# Patient Record
Sex: FEMALE | Race: WHITE | Employment: FULL TIME | URBAN - NONMETROPOLITAN AREA
[De-identification: names, ages, dates, MRNs, and addresses within clinical notes are randomized per-mention and may not be internally consistent; named-entity substitution may affect disease eponyms.]

---

## 2017-01-25 DIAGNOSIS — M62.830 MUSCLE SPASM OF BACK: ICD-10-CM

## 2017-01-26 RX ORDER — TIZANIDINE 2 MG/1
TABLET ORAL
Qty: 30 TABLET | Refills: 0 | Status: SHIPPED | OUTPATIENT
Start: 2017-01-26 | End: 2017-04-03

## 2017-04-03 ENCOUNTER — OFFICE VISIT (OUTPATIENT)
Dept: FAMILY MEDICINE CLINIC | Age: 22
End: 2017-04-03

## 2017-04-03 VITALS
BODY MASS INDEX: 36.14 KG/M2 | SYSTOLIC BLOOD PRESSURE: 118 MMHG | WEIGHT: 204 LBS | DIASTOLIC BLOOD PRESSURE: 72 MMHG | HEART RATE: 76 BPM | RESPIRATION RATE: 16 BRPM

## 2017-04-03 DIAGNOSIS — F41.9 ANXIETY: ICD-10-CM

## 2017-04-03 DIAGNOSIS — F32.A DEPRESSION, UNSPECIFIED DEPRESSION TYPE: ICD-10-CM

## 2017-04-03 PROCEDURE — 99213 OFFICE O/P EST LOW 20 MIN: CPT | Performed by: NURSE PRACTITIONER

## 2017-04-03 RX ORDER — VENLAFAXINE HYDROCHLORIDE 75 MG/1
75 CAPSULE, EXTENDED RELEASE ORAL DAILY
Qty: 30 CAPSULE | Refills: 1 | Status: SHIPPED | OUTPATIENT
Start: 2017-04-10 | End: 2017-05-01 | Stop reason: SDUPTHER

## 2017-04-03 RX ORDER — VENLAFAXINE HYDROCHLORIDE 37.5 MG/1
37.5 CAPSULE, EXTENDED RELEASE ORAL DAILY
Qty: 7 CAPSULE | Refills: 0 | Status: SHIPPED | OUTPATIENT
Start: 2017-04-03 | End: 2017-05-01 | Stop reason: ALTCHOICE

## 2017-04-03 RX ORDER — BUSPIRONE HYDROCHLORIDE 10 MG/1
5 TABLET ORAL 3 TIMES DAILY PRN
Qty: 30 TABLET | Refills: 1 | Status: SHIPPED | OUTPATIENT
Start: 2017-04-03 | End: 2017-08-11 | Stop reason: SDUPTHER

## 2017-04-03 ASSESSMENT — ENCOUNTER SYMPTOMS
COUGH: 0
CHEST TIGHTNESS: 0
NAUSEA: 0
ABDOMINAL PAIN: 0
WHEEZING: 0
SHORTNESS OF BREATH: 0
DIARRHEA: 0
CONSTIPATION: 0
VOMITING: 0

## 2017-04-03 ASSESSMENT — PATIENT HEALTH QUESTIONNAIRE - PHQ9
SUM OF ALL RESPONSES TO PHQ9 QUESTIONS 1 & 2: 1
2. FEELING DOWN, DEPRESSED OR HOPELESS: 1
1. LITTLE INTEREST OR PLEASURE IN DOING THINGS: 0
SUM OF ALL RESPONSES TO PHQ QUESTIONS 1-9: 1

## 2017-05-01 ENCOUNTER — OFFICE VISIT (OUTPATIENT)
Dept: FAMILY MEDICINE CLINIC | Age: 22
End: 2017-05-01

## 2017-05-01 VITALS
DIASTOLIC BLOOD PRESSURE: 74 MMHG | HEART RATE: 68 BPM | BODY MASS INDEX: 35.61 KG/M2 | WEIGHT: 201 LBS | RESPIRATION RATE: 16 BRPM | HEIGHT: 63 IN | SYSTOLIC BLOOD PRESSURE: 122 MMHG

## 2017-05-01 DIAGNOSIS — E66.09 NON MORBID OBESITY DUE TO EXCESS CALORIES: ICD-10-CM

## 2017-05-01 DIAGNOSIS — F32.A DEPRESSION, UNSPECIFIED DEPRESSION TYPE: ICD-10-CM

## 2017-05-01 DIAGNOSIS — F41.9 ANXIETY: ICD-10-CM

## 2017-05-01 PROCEDURE — 99213 OFFICE O/P EST LOW 20 MIN: CPT | Performed by: NURSE PRACTITIONER

## 2017-05-01 RX ORDER — VENLAFAXINE HYDROCHLORIDE 75 MG/1
75 CAPSULE, EXTENDED RELEASE ORAL DAILY
Qty: 30 CAPSULE | Refills: 3 | Status: SHIPPED | OUTPATIENT
Start: 2017-05-01 | End: 2017-06-05

## 2017-05-01 ASSESSMENT — ENCOUNTER SYMPTOMS
NAUSEA: 0
COUGH: 0
CONSTIPATION: 0
SHORTNESS OF BREATH: 0
DIARRHEA: 0
ABDOMINAL PAIN: 0
CHEST TIGHTNESS: 0
WHEEZING: 0
VOMITING: 0

## 2017-05-01 ASSESSMENT — PATIENT HEALTH QUESTIONNAIRE - PHQ9
SUM OF ALL RESPONSES TO PHQ QUESTIONS 1-9: 0
SUM OF ALL RESPONSES TO PHQ9 QUESTIONS 1 & 2: 0
2. FEELING DOWN, DEPRESSED OR HOPELESS: 0
1. LITTLE INTEREST OR PLEASURE IN DOING THINGS: 0

## 2017-06-05 ENCOUNTER — OFFICE VISIT (OUTPATIENT)
Dept: FAMILY MEDICINE CLINIC | Age: 22
End: 2017-06-05

## 2017-06-05 VITALS
SYSTOLIC BLOOD PRESSURE: 104 MMHG | RESPIRATION RATE: 15 BRPM | DIASTOLIC BLOOD PRESSURE: 58 MMHG | WEIGHT: 202 LBS | HEART RATE: 68 BPM | BODY MASS INDEX: 35.78 KG/M2

## 2017-06-05 DIAGNOSIS — G43.009 MIGRAINE WITHOUT AURA AND WITHOUT STATUS MIGRAINOSUS, NOT INTRACTABLE: ICD-10-CM

## 2017-06-05 DIAGNOSIS — F41.9 ANXIETY: ICD-10-CM

## 2017-06-05 PROCEDURE — 99213 OFFICE O/P EST LOW 20 MIN: CPT | Performed by: NURSE PRACTITIONER

## 2017-06-05 ASSESSMENT — ENCOUNTER SYMPTOMS
NAUSEA: 0
WHEEZING: 0
ABDOMINAL PAIN: 0
DIARRHEA: 0
CHEST TIGHTNESS: 0
SHORTNESS OF BREATH: 0
CONSTIPATION: 0
COUGH: 0
VOMITING: 0

## 2017-07-19 ENCOUNTER — OFFICE VISIT (OUTPATIENT)
Dept: FAMILY MEDICINE CLINIC | Age: 22
End: 2017-07-19

## 2017-07-19 VITALS
BODY MASS INDEX: 33.66 KG/M2 | HEART RATE: 96 BPM | RESPIRATION RATE: 18 BRPM | SYSTOLIC BLOOD PRESSURE: 116 MMHG | DIASTOLIC BLOOD PRESSURE: 74 MMHG | WEIGHT: 190 LBS | HEIGHT: 63 IN | OXYGEN SATURATION: 98 %

## 2017-07-19 DIAGNOSIS — F41.9 ANXIETY: Primary | ICD-10-CM

## 2017-07-19 DIAGNOSIS — Z91.040 LATEX ALLERGY: ICD-10-CM

## 2017-07-19 DIAGNOSIS — F32.A DEPRESSION, UNSPECIFIED DEPRESSION TYPE: ICD-10-CM

## 2017-07-19 DIAGNOSIS — G43.009 MIGRAINE WITHOUT AURA AND WITHOUT STATUS MIGRAINOSUS, NOT INTRACTABLE: ICD-10-CM

## 2017-07-19 PROCEDURE — 99214 OFFICE O/P EST MOD 30 MIN: CPT | Performed by: NURSE PRACTITIONER

## 2017-07-19 RX ORDER — TOPIRAMATE 25 MG/1
1 CAPSULE, EXTENDED RELEASE ORAL DAILY
Qty: 30 EACH | Refills: 3 | Status: SHIPPED | OUTPATIENT
Start: 2017-07-19 | End: 2017-08-07 | Stop reason: ALTCHOICE

## 2017-07-19 RX ORDER — EPINEPHRINE 0.3 MG/.3ML
INJECTION SUBCUTANEOUS
Qty: 2 EACH | Refills: 0 | Status: SHIPPED | OUTPATIENT
Start: 2017-07-19 | End: 2018-09-07 | Stop reason: SDUPTHER

## 2017-07-19 RX ORDER — FLUOXETINE 20 MG/1
20 TABLET, FILM COATED ORAL DAILY
Qty: 30 TABLET | Refills: 3 | Status: SHIPPED | OUTPATIENT
Start: 2017-07-19 | End: 2017-08-24

## 2017-07-19 ASSESSMENT — ENCOUNTER SYMPTOMS
WHEEZING: 0
COUGH: 0
NAUSEA: 0
VOMITING: 0
CHEST TIGHTNESS: 0
CONSTIPATION: 0
DIARRHEA: 0
ABDOMINAL PAIN: 0
SHORTNESS OF BREATH: 0

## 2017-08-07 RX ORDER — TOPIRAMATE 25 MG/1
25 TABLET ORAL NIGHTLY
Qty: 30 TABLET | Refills: 3 | Status: SHIPPED | OUTPATIENT
Start: 2017-08-07 | End: 2018-09-07 | Stop reason: SDUPTHER

## 2017-08-11 DIAGNOSIS — F41.9 ANXIETY: ICD-10-CM

## 2017-08-14 RX ORDER — BUSPIRONE HYDROCHLORIDE 10 MG/1
TABLET ORAL
Qty: 30 TABLET | Refills: 0 | Status: SHIPPED | OUTPATIENT
Start: 2017-08-14 | End: 2017-08-24

## 2017-08-24 ENCOUNTER — OFFICE VISIT (OUTPATIENT)
Dept: FAMILY MEDICINE CLINIC | Age: 22
End: 2017-08-24

## 2017-08-24 VITALS
SYSTOLIC BLOOD PRESSURE: 110 MMHG | RESPIRATION RATE: 18 BRPM | DIASTOLIC BLOOD PRESSURE: 72 MMHG | OXYGEN SATURATION: 99 % | HEART RATE: 91 BPM | BODY MASS INDEX: 34.91 KG/M2 | HEIGHT: 63 IN | WEIGHT: 197 LBS

## 2017-08-24 DIAGNOSIS — Z91.040 LATEX ALLERGY: ICD-10-CM

## 2017-08-24 DIAGNOSIS — F41.9 ANXIETY: ICD-10-CM

## 2017-08-24 DIAGNOSIS — E66.09 NON MORBID OBESITY DUE TO EXCESS CALORIES: ICD-10-CM

## 2017-08-24 DIAGNOSIS — G43.009 MIGRAINE WITHOUT AURA AND WITHOUT STATUS MIGRAINOSUS, NOT INTRACTABLE: ICD-10-CM

## 2017-08-24 PROCEDURE — 99214 OFFICE O/P EST MOD 30 MIN: CPT | Performed by: NURSE PRACTITIONER

## 2017-08-24 RX ORDER — BUSPIRONE HYDROCHLORIDE 10 MG/1
TABLET ORAL
Qty: 30 TABLET | Refills: 1 | Status: SHIPPED | OUTPATIENT
Start: 2017-08-24 | End: 2018-09-07 | Stop reason: SDUPTHER

## 2017-08-24 ASSESSMENT — ENCOUNTER SYMPTOMS
CONSTIPATION: 0
DIARRHEA: 0
COUGH: 0
NAUSEA: 0
WHEEZING: 0
SHORTNESS OF BREATH: 0
CHEST TIGHTNESS: 0
VOMITING: 0
ABDOMINAL PAIN: 0

## 2017-09-19 ENCOUNTER — HOSPITAL ENCOUNTER (OUTPATIENT)
Dept: MRI IMAGING | Age: 22
Discharge: HOME | End: 2017-09-19
Attending: CHIROPRACTOR | Admitting: CHIROPRACTOR

## 2017-09-19 DIAGNOSIS — M51.16 INTERVERTEBRAL DISC DISORDER WITH RADICULOPATHY OF LUMBAR REGION: ICD-10-CM

## 2017-09-19 DIAGNOSIS — M51.16 DISPLACEMENT OF LUMBAR DISC WITH RADICULOPATHY: ICD-10-CM

## 2018-09-07 DIAGNOSIS — Z91.040 LATEX ALLERGY: ICD-10-CM

## 2018-09-07 DIAGNOSIS — F41.9 ANXIETY: ICD-10-CM

## 2018-09-10 RX ORDER — BUSPIRONE HYDROCHLORIDE 10 MG/1
TABLET ORAL
Qty: 30 TABLET | Refills: 0 | Status: SHIPPED | OUTPATIENT
Start: 2018-09-10 | End: 2019-01-09 | Stop reason: SDUPTHER

## 2018-09-10 RX ORDER — TOPIRAMATE 25 MG/1
25 TABLET ORAL NIGHTLY
Qty: 30 TABLET | Refills: 0 | Status: SHIPPED | OUTPATIENT
Start: 2018-09-10 | End: 2018-11-14 | Stop reason: ALTCHOICE

## 2018-09-10 RX ORDER — EPINEPHRINE 0.3 MG/.3ML
INJECTION SUBCUTANEOUS
Qty: 2 EACH | Refills: 0 | Status: SHIPPED | OUTPATIENT
Start: 2018-09-10 | End: 2020-07-13 | Stop reason: SDUPTHER

## 2018-10-23 ENCOUNTER — HOSPITAL ENCOUNTER (OUTPATIENT)
Age: 23
Setting detail: SPECIMEN
Discharge: HOME | End: 2018-10-23

## 2018-10-23 PROCEDURE — 86850 RBC ANTIBODY SCREEN: CPT

## 2018-10-23 PROCEDURE — 86900 BLOOD TYPING SEROLOGIC ABO: CPT

## 2018-10-23 PROCEDURE — 86901 BLOOD TYPING SEROLOGIC RH(D): CPT

## 2018-10-30 ENCOUNTER — APPOINTMENT (OUTPATIENT)
Dept: CT IMAGING | Age: 23
End: 2018-10-30
Payer: MEDICAID

## 2018-10-30 ENCOUNTER — HOSPITAL ENCOUNTER (EMERGENCY)
Age: 23
Discharge: HOME | End: 2018-10-30
Attending: EMERGENCY MEDICINE
Payer: MEDICAID

## 2018-10-30 VITALS
HEART RATE: 103 BPM | RESPIRATION RATE: 16 BRPM | OXYGEN SATURATION: 100 % | HEIGHT: 63 IN | BODY MASS INDEX: 34.55 KG/M2 | DIASTOLIC BLOOD PRESSURE: 73 MMHG | TEMPERATURE: 97.9 F | SYSTOLIC BLOOD PRESSURE: 132 MMHG | WEIGHT: 195 LBS

## 2018-10-30 DIAGNOSIS — D64.9 ANEMIA, UNSPECIFIED TYPE: ICD-10-CM

## 2018-10-30 DIAGNOSIS — G89.18 POST-OPERATIVE PAIN: Primary | ICD-10-CM

## 2018-10-30 LAB
ANION GAP SERPL CALCULATED.3IONS-SCNC: 12 MMOL/L (ref 4–16)
BASOPHILS ABSOLUTE: 0 K/CU MM
BASOPHILS RELATIVE PERCENT: 0.3 % (ref 0–1)
BUN BLDV-MCNC: 13 MG/DL (ref 6–23)
CALCIUM SERPL-MCNC: 10.2 MG/DL (ref 8.3–10.6)
CHLORIDE BLD-SCNC: 103 MMOL/L (ref 99–110)
CO2: 28 MMOL/L (ref 21–32)
CREAT SERPL-MCNC: 0.6 MG/DL (ref 0.6–1.1)
DIFFERENTIAL TYPE: ABNORMAL
EOSINOPHILS ABSOLUTE: 0.2 K/CU MM
EOSINOPHILS RELATIVE PERCENT: 2.4 % (ref 0–3)
GFR AFRICAN AMERICAN: >60 ML/MIN/1.73M2
GFR NON-AFRICAN AMERICAN: >60 ML/MIN/1.73M2
GLUCOSE BLD-MCNC: 100 MG/DL (ref 70–99)
HCT VFR BLD CALC: 28 % (ref 37–47)
HEMOGLOBIN: 8.7 GM/DL (ref 12.5–16)
IMMATURE NEUTROPHIL %: 1.4 % (ref 0–0.43)
LYMPHOCYTES ABSOLUTE: 2.3 K/CU MM
LYMPHOCYTES RELATIVE PERCENT: 25.5 % (ref 24–44)
MCH RBC QN AUTO: 27.4 PG (ref 27–31)
MCHC RBC AUTO-ENTMCNC: 31.1 % (ref 32–36)
MCV RBC AUTO: 88.3 FL (ref 78–100)
MONOCYTES ABSOLUTE: 0.8 K/CU MM
MONOCYTES RELATIVE PERCENT: 8.6 % (ref 0–4)
PDW BLD-RTO: 13.5 % (ref 11.7–14.9)
PLATELET # BLD: 354 K/CU MM (ref 140–440)
PMV BLD AUTO: 10.9 FL (ref 7.5–11.1)
POTASSIUM SERPL-SCNC: 3.6 MMOL/L (ref 3.5–5.1)
RBC # BLD: 3.17 M/CU MM (ref 4.2–5.4)
SEGMENTED NEUTROPHILS ABSOLUTE COUNT: 5.5 K/CU MM
SEGMENTED NEUTROPHILS RELATIVE PERCENT: 61.8 % (ref 36–66)
SODIUM BLD-SCNC: 143 MMOL/L (ref 135–145)
TOTAL IMMATURE NEUTOROPHIL: 0.12 K/CU MM
WBC # BLD: 8.9 K/CU MM (ref 4–10.5)

## 2018-10-30 PROCEDURE — 85025 COMPLETE CBC W/AUTO DIFF WBC: CPT

## 2018-10-30 PROCEDURE — 80048 BASIC METABOLIC PNL TOTAL CA: CPT

## 2018-10-30 PROCEDURE — 96374 THER/PROPH/DIAG INJ IV PUSH: CPT

## 2018-10-30 PROCEDURE — 6360000002 HC RX W HCPCS: Performed by: EMERGENCY MEDICINE

## 2018-10-30 PROCEDURE — 99284 EMERGENCY DEPT VISIT MOD MDM: CPT

## 2018-10-30 PROCEDURE — 74176 CT ABD & PELVIS W/O CONTRAST: CPT

## 2018-10-30 RX ORDER — IBUPROFEN 800 MG/1
800 TABLET ORAL EVERY 6 HOURS PRN
COMMUNITY
End: 2019-01-09 | Stop reason: ALTCHOICE

## 2018-10-30 RX ORDER — OXYCODONE HYDROCHLORIDE 5 MG/1
5 TABLET ORAL EVERY 4 HOURS PRN
COMMUNITY
End: 2018-11-14 | Stop reason: CLARIF

## 2018-10-30 RX ORDER — KETOROLAC TROMETHAMINE 30 MG/ML
15 INJECTION, SOLUTION INTRAMUSCULAR; INTRAVENOUS ONCE
Status: COMPLETED | OUTPATIENT
Start: 2018-10-30 | End: 2018-10-30

## 2018-10-30 RX ADMIN — KETOROLAC TROMETHAMINE 15 MG: 30 INJECTION, SOLUTION INTRAMUSCULAR; INTRAVENOUS at 15:35

## 2018-10-30 ASSESSMENT — PAIN DESCRIPTION - ORIENTATION: ORIENTATION: MID;LOWER

## 2018-10-30 ASSESSMENT — ENCOUNTER SYMPTOMS
ABDOMINAL PAIN: 1
DIARRHEA: 0
RESPIRATORY NEGATIVE: 1
RECTAL PAIN: 0
VOMITING: 0
NAUSEA: 0
BLOOD IN STOOL: 0

## 2018-10-30 ASSESSMENT — PAIN DESCRIPTION - LOCATION
LOCATION: ABDOMEN
LOCATION: ABDOMEN

## 2018-10-30 ASSESSMENT — PAIN DESCRIPTION - DESCRIPTORS: DESCRIPTORS: CONSTANT

## 2018-10-30 ASSESSMENT — PAIN DESCRIPTION - FREQUENCY: FREQUENCY: CONTINUOUS

## 2018-10-30 ASSESSMENT — PAIN DESCRIPTION - PROGRESSION: CLINICAL_PROGRESSION: NOT CHANGED

## 2018-10-30 ASSESSMENT — PAIN DESCRIPTION - PAIN TYPE
TYPE: ACUTE PAIN
TYPE: ACUTE PAIN

## 2018-10-30 ASSESSMENT — PAIN SCALES - GENERAL
PAINLEVEL_OUTOF10: 6
PAINLEVEL_OUTOF10: 6
PAINLEVEL_OUTOF10: 3

## 2018-10-30 ASSESSMENT — PAIN DESCRIPTION - ONSET: ONSET: ON-GOING

## 2018-10-30 NOTE — ED PROVIDER NOTES
Triage Chief Complaint:   Post-op Problem (pt had hyster last Thurs and over weekend states that she has worse pain today, states did not take oxycodone today due to she was supposed to  her kids today, only motrin today and states pain worse)    [place]:  Suad Swanson is a 20 y.o. female that presents to the ED complaining of pain in her lower dominant area. She is status post vaginal hysterectomy by Dr. August Vann last Thursday. She states she just doesn't feel well she feels lightheaded and has pain in the lower abdominal area. Denies any current vaginal bleeding no fever chills no dysuria. Denies any back pain. Denies any chest pain cough shortness of breath. Past Medical History:   Diagnosis Date    37 weeks gestation of pregnancy 6/28/2016    Anemia     History of postpartum hemorrhage, currently pregnant in third trimester 6/28/2016    Migraines     Preeclampsia 2014    Sciatica      Past Surgical History:   Procedure Laterality Date    DENTAL SURGERY      wisdom teeth    HYSTERECTOMY      SALPINGECTOMY       Family History   Problem Relation Age of Onset    Heart Disease Brother     High Blood Pressure Brother     Learning Disabilities Brother      Social History     Social History    Marital status:      Spouse name: N/A    Number of children: N/A    Years of education: N/A     Occupational History    Not on file. Social History Main Topics    Smoking status: Never Smoker    Smokeless tobacco: Never Used    Alcohol use No    Drug use: No    Sexual activity: Yes     Partners: Male     Other Topics Concern    Not on file     Social History Narrative    No narrative on file     No current facility-administered medications for this encounter.       Current Outpatient Prescriptions   Medication Sig Dispense Refill    ibuprofen (ADVIL;MOTRIN) 800 MG tablet Take 800 mg by mouth every 6 hours as needed for Pain      oxyCODONE (ROXICODONE) 5 MG immediate release There is tenderness in the suprapubic area. There is no rigidity and no guarding. Musculoskeletal: Normal range of motion. Neurological: She is alert and oriented to person, place, and time. Skin: Skin is warm and dry. There is pallor. Psychiatric: She has a normal mood and affect. Nursing note and vitals reviewed.       I have reviewed and interpreted all of the currently available lab results from this visit (ifapplicable):  Results for orders placed or performed during the hospital encounter of 10/30/18   CBC auto diff   Result Value Ref Range    WBC 8.9 4.0 - 10.5 K/CU MM    RBC 3.17 (L) 4.2 - 5.4 M/CU MM    Hemoglobin 8.7 (L) 12.5 - 16.0 GM/DL    Hematocrit 28.0 (L) 37 - 47 %    MCV 88.3 78 - 100 FL    MCH 27.4 27 - 31 PG    MCHC 31.1 (L) 32.0 - 36.0 %    RDW 13.5 11.7 - 14.9 %    Platelets 352 887 - 526 K/CU MM    MPV 10.9 7.5 - 11.1 FL    Differential Type AUTOMATED DIFFERENTIAL     Segs Relative 61.8 36 - 66 %    Lymphocytes % 25.5 24 - 44 %    Monocytes % 8.6 (H) 0 - 4 %    Eosinophils % 2.4 0 - 3 %    Basophils % 0.3 0 - 1 %    Segs Absolute 5.5 K/CU MM    Lymphocytes # 2.3 K/CU MM    Monocytes # 0.8 K/CU MM    Eosinophils # 0.2 K/CU MM    Basophils # 0.0 K/CU MM    Immature Neutrophil % 1.4 (H) 0 - 0.43 %    Total Immature Neutrophil 0.12 K/CU MM   BMP   Result Value Ref Range    Sodium 143 135 - 145 MMOL/L    Potassium 3.6 3.5 - 5.1 MMOL/L    Chloride 103 99 - 110 mMol/L    CO2 28 21 - 32 MMOL/L    Anion Gap 12 4 - 16    BUN 13 6 - 23 MG/DL    CREATININE 0.6 0.6 - 1.1 MG/DL    Glucose 100 (H) 70 - 99 MG/DL    Calcium 10.2 8.3 - 10.6 MG/DL    GFR Non-African American >60 >60 mL/min/1.73m2    GFR African American >60 >60 mL/min/1.73m2      Radiographs (if obtained):  [] The following radiograph wasinterpreted by myself in the absence of a radiologist:   [] Radiologist's Report Reviewed:  CT ABDOMEN PELVIS WO CONTRAST Additional Contrast? None    (Results Pending)         EKG (if obtained): (All

## 2018-10-30 NOTE — ED NOTES
I left a message for Dr. Jennifer Belle to call us regarding a consult for this patient.      Manohar Saleh  10/30/18 [id number]

## 2018-11-07 ENCOUNTER — TELEPHONE (OUTPATIENT)
Dept: BARIATRICS/WEIGHT MGMT | Age: 23
End: 2018-11-07

## 2018-11-07 ENCOUNTER — OFFICE VISIT (OUTPATIENT)
Dept: FAMILY MEDICINE CLINIC | Age: 23
End: 2018-11-07
Payer: MEDICAID

## 2018-11-07 VITALS
BODY MASS INDEX: 34.72 KG/M2 | DIASTOLIC BLOOD PRESSURE: 64 MMHG | RESPIRATION RATE: 14 BRPM | WEIGHT: 196 LBS | SYSTOLIC BLOOD PRESSURE: 114 MMHG | HEART RATE: 102 BPM

## 2018-11-07 DIAGNOSIS — R06.02 SOB (SHORTNESS OF BREATH): ICD-10-CM

## 2018-11-07 DIAGNOSIS — F32.2 CURRENT SEVERE EPISODE OF MAJOR DEPRESSIVE DISORDER WITHOUT PSYCHOTIC FEATURES, UNSPECIFIED WHETHER RECURRENT (HCC): ICD-10-CM

## 2018-11-07 DIAGNOSIS — D64.9 LOW HEMOGLOBIN: ICD-10-CM

## 2018-11-07 DIAGNOSIS — R10.11 RUQ PAIN: ICD-10-CM

## 2018-11-07 DIAGNOSIS — K80.20 GALLSTONES: ICD-10-CM

## 2018-11-07 DIAGNOSIS — R06.02 SOB (SHORTNESS OF BREATH): Primary | ICD-10-CM

## 2018-11-07 DIAGNOSIS — R11.0 NAUSEA: ICD-10-CM

## 2018-11-07 LAB — D DIMER: 896 NG/ML DDU (ref 0–229)

## 2018-11-07 PROCEDURE — G8427 DOCREV CUR MEDS BY ELIG CLIN: HCPCS | Performed by: NURSE PRACTITIONER

## 2018-11-07 PROCEDURE — G8417 CALC BMI ABV UP PARAM F/U: HCPCS | Performed by: NURSE PRACTITIONER

## 2018-11-07 PROCEDURE — G8484 FLU IMMUNIZE NO ADMIN: HCPCS | Performed by: NURSE PRACTITIONER

## 2018-11-07 PROCEDURE — 99214 OFFICE O/P EST MOD 30 MIN: CPT | Performed by: NURSE PRACTITIONER

## 2018-11-07 PROCEDURE — 96160 PT-FOCUSED HLTH RISK ASSMT: CPT | Performed by: NURSE PRACTITIONER

## 2018-11-07 PROCEDURE — 1036F TOBACCO NON-USER: CPT | Performed by: NURSE PRACTITIONER

## 2018-11-07 RX ORDER — VENLAFAXINE HYDROCHLORIDE 75 MG/1
75 CAPSULE, EXTENDED RELEASE ORAL DAILY
Qty: 30 CAPSULE | Refills: 1 | Status: SHIPPED | OUTPATIENT
Start: 2018-11-14 | End: 2018-12-12 | Stop reason: ALTCHOICE

## 2018-11-07 RX ORDER — VENLAFAXINE HYDROCHLORIDE 37.5 MG/1
37.5 CAPSULE, EXTENDED RELEASE ORAL DAILY
Qty: 7 CAPSULE | Refills: 0 | Status: SHIPPED | OUTPATIENT
Start: 2018-11-07 | End: 2018-11-30

## 2018-11-07 RX ORDER — ONDANSETRON 4 MG/1
4 TABLET, FILM COATED ORAL EVERY 8 HOURS PRN
Qty: 20 TABLET | Refills: 0 | Status: SHIPPED | OUTPATIENT
Start: 2018-11-07 | End: 2019-01-09 | Stop reason: ALTCHOICE

## 2018-11-07 ASSESSMENT — ENCOUNTER SYMPTOMS
SHORTNESS OF BREATH: 1
DIARRHEA: 1
NAUSEA: 1
CHEST TIGHTNESS: 0
VOMITING: 0
WHEEZING: 0
ABDOMINAL PAIN: 0
COUGH: 0
CONSTIPATION: 0

## 2018-11-07 ASSESSMENT — PATIENT HEALTH QUESTIONNAIRE - PHQ9
10. IF YOU CHECKED OFF ANY PROBLEMS, HOW DIFFICULT HAVE THESE PROBLEMS MADE IT FOR YOU TO DO YOUR WORK, TAKE CARE OF THINGS AT HOME, OR GET ALONG WITH OTHER PEOPLE: 3
SUM OF ALL RESPONSES TO PHQ QUESTIONS 1-9: 25
5. POOR APPETITE OR OVEREATING: 3
4. FEELING TIRED OR HAVING LITTLE ENERGY: 3
7. TROUBLE CONCENTRATING ON THINGS, SUCH AS READING THE NEWSPAPER OR WATCHING TELEVISION: 3
9. THOUGHTS THAT YOU WOULD BE BETTER OFF DEAD, OR OF HURTING YOURSELF: 2
SUM OF ALL RESPONSES TO PHQ9 QUESTIONS 1 & 2: 6
3. TROUBLE FALLING OR STAYING ASLEEP: 3
6. FEELING BAD ABOUT YOURSELF - OR THAT YOU ARE A FAILURE OR HAVE LET YOURSELF OR YOUR FAMILY DOWN: 3
2. FEELING DOWN, DEPRESSED OR HOPELESS: 3
1. LITTLE INTEREST OR PLEASURE IN DOING THINGS: 3
SUM OF ALL RESPONSES TO PHQ QUESTIONS 1-9: 25
8. MOVING OR SPEAKING SO SLOWLY THAT OTHER PEOPLE COULD HAVE NOTICED. OR THE OPPOSITE, BEING SO FIGETY OR RESTLESS THAT YOU HAVE BEEN MOVING AROUND A LOT MORE THAN USUAL: 2

## 2018-11-08 ENCOUNTER — HOSPITAL ENCOUNTER (OUTPATIENT)
Dept: CT IMAGING | Age: 23
Discharge: HOME | End: 2018-11-08
Payer: MEDICAID

## 2018-11-08 DIAGNOSIS — R79.89 ELEVATED D-DIMER: Primary | ICD-10-CM

## 2018-11-08 DIAGNOSIS — R79.89 ELEVATED D-DIMER: ICD-10-CM

## 2018-11-08 LAB
A/G RATIO: 1.7 (ref 1.1–2.2)
ALBUMIN SERPL-MCNC: 4.8 G/DL (ref 3.4–5)
ALP BLD-CCNC: 76 U/L (ref 40–129)
ALT SERPL-CCNC: 9 U/L (ref 10–40)
AMYLASE: 55 U/L (ref 25–115)
ANION GAP SERPL CALCULATED.3IONS-SCNC: 14 MMOL/L (ref 3–16)
AST SERPL-CCNC: 10 U/L (ref 15–37)
BILIRUB SERPL-MCNC: 0.3 MG/DL (ref 0–1)
BUN BLDV-MCNC: 18 MG/DL (ref 7–20)
CALCIUM SERPL-MCNC: 9.9 MG/DL (ref 8.3–10.6)
CHLORIDE BLD-SCNC: 100 MMOL/L (ref 99–110)
CO2: 26 MMOL/L (ref 21–32)
CREAT SERPL-MCNC: 0.5 MG/DL (ref 0.6–1.1)
GFR AFRICAN AMERICAN: >60
GFR NON-AFRICAN AMERICAN: >60
GLOBULIN: 2.8 G/DL
GLUCOSE BLD-MCNC: 67 MG/DL (ref 70–99)
HCT VFR BLD CALC: 32.6 % (ref 36–48)
HEMOGLOBIN: 11 G/DL (ref 12–16)
LIPASE: 29 U/L (ref 13–60)
MCH RBC QN AUTO: 27.9 PG (ref 26–34)
MCHC RBC AUTO-ENTMCNC: 33.6 G/DL (ref 31–36)
MCV RBC AUTO: 82.9 FL (ref 80–100)
PDW BLD-RTO: 14.3 % (ref 12.4–15.4)
PLATELET # BLD: 481 K/UL (ref 135–450)
PMV BLD AUTO: 9 FL (ref 5–10.5)
POTASSIUM SERPL-SCNC: 4.6 MMOL/L (ref 3.5–5.1)
RBC # BLD: 3.94 M/UL (ref 4–5.2)
SODIUM BLD-SCNC: 140 MMOL/L (ref 136–145)
TOTAL PROTEIN: 7.6 G/DL (ref 6.4–8.2)
WBC # BLD: 8.9 K/UL (ref 4–11)

## 2018-11-08 PROCEDURE — 71275 CT ANGIOGRAPHY CHEST: CPT

## 2018-11-08 PROCEDURE — 6360000004 HC RX CONTRAST MEDICATION: Performed by: NURSE PRACTITIONER

## 2018-11-08 RX ADMIN — IOPAMIDOL 75 ML: 755 INJECTION, SOLUTION INTRAVENOUS at 13:26

## 2018-11-13 ENCOUNTER — TELEPHONE (OUTPATIENT)
Dept: SURGERY | Age: 23
End: 2018-11-13

## 2018-11-14 ENCOUNTER — OFFICE VISIT (OUTPATIENT)
Dept: FAMILY MEDICINE CLINIC | Age: 23
End: 2018-11-14
Payer: MEDICAID

## 2018-11-14 VITALS
DIASTOLIC BLOOD PRESSURE: 82 MMHG | WEIGHT: 197.8 LBS | SYSTOLIC BLOOD PRESSURE: 110 MMHG | RESPIRATION RATE: 14 BRPM | BODY MASS INDEX: 35.05 KG/M2 | HEART RATE: 79 BPM | HEIGHT: 63 IN

## 2018-11-14 DIAGNOSIS — K59.03 DRUG-INDUCED CONSTIPATION: ICD-10-CM

## 2018-11-14 DIAGNOSIS — K80.20 GALLSTONES: ICD-10-CM

## 2018-11-14 DIAGNOSIS — R79.89 ELEVATED PLATELET COUNT: ICD-10-CM

## 2018-11-14 DIAGNOSIS — R11.15 NON-INTRACTABLE CYCLICAL VOMITING WITH NAUSEA: Primary | ICD-10-CM

## 2018-11-14 PROCEDURE — 1036F TOBACCO NON-USER: CPT | Performed by: NURSE PRACTITIONER

## 2018-11-14 PROCEDURE — 96160 PT-FOCUSED HLTH RISK ASSMT: CPT | Performed by: NURSE PRACTITIONER

## 2018-11-14 PROCEDURE — 99213 OFFICE O/P EST LOW 20 MIN: CPT | Performed by: NURSE PRACTITIONER

## 2018-11-14 PROCEDURE — G8427 DOCREV CUR MEDS BY ELIG CLIN: HCPCS | Performed by: NURSE PRACTITIONER

## 2018-11-14 PROCEDURE — G8417 CALC BMI ABV UP PARAM F/U: HCPCS | Performed by: NURSE PRACTITIONER

## 2018-11-14 PROCEDURE — G8484 FLU IMMUNIZE NO ADMIN: HCPCS | Performed by: NURSE PRACTITIONER

## 2018-11-14 RX ORDER — PROMETHAZINE HYDROCHLORIDE 12.5 MG/1
12.5-25 TABLET ORAL
Qty: 30 TABLET | Refills: 0 | Status: SHIPPED | OUTPATIENT
Start: 2018-11-14 | End: 2018-11-21

## 2018-11-14 ASSESSMENT — PATIENT HEALTH QUESTIONNAIRE - PHQ9
1. LITTLE INTEREST OR PLEASURE IN DOING THINGS: 3
7. TROUBLE CONCENTRATING ON THINGS, SUCH AS READING THE NEWSPAPER OR WATCHING TELEVISION: 3
SUM OF ALL RESPONSES TO PHQ9 QUESTIONS 1 & 2: 6
5. POOR APPETITE OR OVEREATING: 3
4. FEELING TIRED OR HAVING LITTLE ENERGY: 3
SUM OF ALL RESPONSES TO PHQ QUESTIONS 1-9: 25
6. FEELING BAD ABOUT YOURSELF - OR THAT YOU ARE A FAILURE OR HAVE LET YOURSELF OR YOUR FAMILY DOWN: 3
8. MOVING OR SPEAKING SO SLOWLY THAT OTHER PEOPLE COULD HAVE NOTICED. OR THE OPPOSITE, BEING SO FIGETY OR RESTLESS THAT YOU HAVE BEEN MOVING AROUND A LOT MORE THAN USUAL: 3
2. FEELING DOWN, DEPRESSED OR HOPELESS: 3
SUM OF ALL RESPONSES TO PHQ QUESTIONS 1-9: 25
3. TROUBLE FALLING OR STAYING ASLEEP: 3
10. IF YOU CHECKED OFF ANY PROBLEMS, HOW DIFFICULT HAVE THESE PROBLEMS MADE IT FOR YOU TO DO YOUR WORK, TAKE CARE OF THINGS AT HOME, OR GET ALONG WITH OTHER PEOPLE: 3
9. THOUGHTS THAT YOU WOULD BE BETTER OFF DEAD, OR OF HURTING YOURSELF: 1

## 2018-11-14 ASSESSMENT — ENCOUNTER SYMPTOMS
VOMITING: 1
NAUSEA: 1
ABDOMINAL PAIN: 0
DIARRHEA: 0
SHORTNESS OF BREATH: 0
CONSTIPATION: 1
CHEST TIGHTNESS: 0
WHEEZING: 0
COUGH: 0

## 2018-11-14 NOTE — PROGRESS NOTES
SUBJECTIVE:    Marcos Ragsdale  1995  20 y.o.  female      Chief Complaint   Patient presents with    Follow-up    Anxiety    [facility]     for anemia & gallbladder; discharged same day on 10/30/18; had a hysterectomy 6 days before this, it caused the issues    Constipation     HPI     Patient states that her shoulder pain has improved. She still has nausea that is not controlled with zofran. She continues to have pain on her right side. She is taking ibuprofen PRN for pain. She does have oxycodone still that she has not needed. She has an appointment with Dr. Noa Faith in two weeks. She has had issues with constipation since starting iron. No good bowel movement in 6 days. Her hemoglobin and hematocrit have improved. She has tried senna without relief.      Allergies   Allergen Reactions    Latex Hives and Rash    Macrobid [Nitrofurantoin Macrocrystal] Hives     No SOB or closing of throat noted when pt had reaction      Norco [Hydrocodone-Acetaminophen]      Makes her really sick to her stomach; hard to wake up with,it keeps her asleep    Nuvaring [Etonogestrel-Ethinyl Estradiol] Other (See Comments)     Severe cramping and bleeding    Ropivacaine     Tape Curlee Spitz Tape] Rash       Current Outpatient Prescriptions   Medication Sig Dispense Refill    Homeopathic Products ([facility] CONSTIPATION RELIEF PO) Take by mouth      promethazine (PHENERGAN) 12.5 MG tablet Take 1-2 tablets by mouth every 4-6 hours as needed for Nausea 30 tablet 0    venlafaxine (EFFEXOR XR) 37.5 MG extended release capsule Take 1 capsule by mouth daily for 7 days 7 capsule 0    venlafaxine (EFFEXOR XR) 75 MG extended release capsule Take 1 capsule by mouth daily 30 capsule 1    ondansetron (ZOFRAN) 4 MG tablet Take 1 tablet by mouth every 8 hours as needed for Nausea or Vomiting 20 tablet 0    ibuprofen (ADVIL;MOTRIN) 800 MG tablet Take 800 mg by mouth every 6 hours as needed for Pain      ferrous sulfate dried

## 2018-11-21 DIAGNOSIS — R79.89 ELEVATED PLATELET COUNT: ICD-10-CM

## 2018-11-21 DIAGNOSIS — R79.89 ELEVATED PLATELET COUNT: Primary | ICD-10-CM

## 2018-11-21 LAB
BASOPHILS ABSOLUTE: 0 K/UL (ref 0–0.2)
BASOPHILS RELATIVE PERCENT: 0.5 %
EOSINOPHILS ABSOLUTE: 0.1 K/UL (ref 0–0.6)
EOSINOPHILS RELATIVE PERCENT: 0.9 %
HCT VFR BLD CALC: 35.3 % (ref 36–48)
HEMOGLOBIN: 11.8 G/DL (ref 12–16)
LYMPHOCYTES ABSOLUTE: 2.8 K/UL (ref 1–5.1)
LYMPHOCYTES RELATIVE PERCENT: 29.8 %
MCH RBC QN AUTO: 27.5 PG (ref 26–34)
MCHC RBC AUTO-ENTMCNC: 33.3 G/DL (ref 31–36)
MCV RBC AUTO: 82.5 FL (ref 80–100)
MONOCYTES ABSOLUTE: 0.6 K/UL (ref 0–1.3)
MONOCYTES RELATIVE PERCENT: 7 %
NEUTROPHILS ABSOLUTE: 5.8 K/UL (ref 1.7–7.7)
NEUTROPHILS RELATIVE PERCENT: 61.8 %
PDW BLD-RTO: 14.4 % (ref 12.4–15.4)
PLATELET # BLD: 308 K/UL (ref 135–450)
PMV BLD AUTO: 9.7 FL (ref 5–10.5)
RBC # BLD: 4.28 M/UL (ref 4–5.2)
WBC # BLD: 9.3 K/UL (ref 4–11)

## 2018-11-21 PROCEDURE — 36415 COLL VENOUS BLD VENIPUNCTURE: CPT | Performed by: NURSE PRACTITIONER

## 2018-12-03 PROBLEM — K80.10 CALCULUS OF GALLBLADDER WITH CHRONIC CHOLECYSTITIS WITHOUT OBSTRUCTION: Status: ACTIVE | Noted: 2018-12-03

## 2018-12-12 ENCOUNTER — OFFICE VISIT (OUTPATIENT)
Dept: FAMILY MEDICINE CLINIC | Age: 23
End: 2018-12-12
Payer: MEDICAID

## 2018-12-12 VITALS
SYSTOLIC BLOOD PRESSURE: 122 MMHG | HEART RATE: 105 BPM | RESPIRATION RATE: 16 BRPM | DIASTOLIC BLOOD PRESSURE: 78 MMHG | WEIGHT: 205 LBS | BODY MASS INDEX: 36.31 KG/M2

## 2018-12-12 DIAGNOSIS — E66.09 NON MORBID OBESITY DUE TO EXCESS CALORIES: ICD-10-CM

## 2018-12-12 DIAGNOSIS — K80.10 CALCULUS OF GALLBLADDER WITH CHRONIC CHOLECYSTITIS WITHOUT OBSTRUCTION: ICD-10-CM

## 2018-12-12 DIAGNOSIS — G43.009 MIGRAINE WITHOUT AURA AND WITHOUT STATUS MIGRAINOSUS, NOT INTRACTABLE: ICD-10-CM

## 2018-12-12 DIAGNOSIS — F32.A DEPRESSION, UNSPECIFIED DEPRESSION TYPE: ICD-10-CM

## 2018-12-12 DIAGNOSIS — F41.9 ANXIETY: ICD-10-CM

## 2018-12-12 PROCEDURE — 1036F TOBACCO NON-USER: CPT | Performed by: NURSE PRACTITIONER

## 2018-12-12 PROCEDURE — G8417 CALC BMI ABV UP PARAM F/U: HCPCS | Performed by: NURSE PRACTITIONER

## 2018-12-12 PROCEDURE — G8427 DOCREV CUR MEDS BY ELIG CLIN: HCPCS | Performed by: NURSE PRACTITIONER

## 2018-12-12 PROCEDURE — 99214 OFFICE O/P EST MOD 30 MIN: CPT | Performed by: NURSE PRACTITIONER

## 2018-12-12 PROCEDURE — G8484 FLU IMMUNIZE NO ADMIN: HCPCS | Performed by: NURSE PRACTITIONER

## 2018-12-12 PROCEDURE — 96160 PT-FOCUSED HLTH RISK ASSMT: CPT | Performed by: NURSE PRACTITIONER

## 2018-12-12 RX ORDER — PROPRANOLOL HYDROCHLORIDE 80 MG/1
80 CAPSULE, EXTENDED RELEASE ORAL DAILY
Qty: 30 CAPSULE | Refills: 1 | Status: SHIPPED | OUTPATIENT
Start: 2018-12-12 | End: 2019-01-09

## 2018-12-12 RX ORDER — BUPROPION HYDROCHLORIDE 150 MG/1
150 TABLET ORAL EVERY MORNING
Qty: 30 TABLET | Refills: 0 | Status: SHIPPED | OUTPATIENT
Start: 2018-12-12 | End: 2019-01-09 | Stop reason: SDUPTHER

## 2018-12-12 ASSESSMENT — ENCOUNTER SYMPTOMS
DIARRHEA: 1
ABDOMINAL PAIN: 1
CONSTIPATION: 0
COUGH: 0
VOMITING: 1
SHORTNESS OF BREATH: 0
NAUSEA: 1
WHEEZING: 0
CHEST TIGHTNESS: 0

## 2018-12-12 ASSESSMENT — PATIENT HEALTH QUESTIONNAIRE - PHQ9
SUM OF ALL RESPONSES TO PHQ9 QUESTIONS 1 & 2: 6
6. FEELING BAD ABOUT YOURSELF - OR THAT YOU ARE A FAILURE OR HAVE LET YOURSELF OR YOUR FAMILY DOWN: 3
9. THOUGHTS THAT YOU WOULD BE BETTER OFF DEAD, OR OF HURTING YOURSELF: 1
3. TROUBLE FALLING OR STAYING ASLEEP: 3
1. LITTLE INTEREST OR PLEASURE IN DOING THINGS: 3
8. MOVING OR SPEAKING SO SLOWLY THAT OTHER PEOPLE COULD HAVE NOTICED. OR THE OPPOSITE, BEING SO FIGETY OR RESTLESS THAT YOU HAVE BEEN MOVING AROUND A LOT MORE THAN USUAL: 3
7. TROUBLE CONCENTRATING ON THINGS, SUCH AS READING THE NEWSPAPER OR WATCHING TELEVISION: 3
10. IF YOU CHECKED OFF ANY PROBLEMS, HOW DIFFICULT HAVE THESE PROBLEMS MADE IT FOR YOU TO DO YOUR WORK, TAKE CARE OF THINGS AT HOME, OR GET ALONG WITH OTHER PEOPLE: 2
4. FEELING TIRED OR HAVING LITTLE ENERGY: 3
SUM OF ALL RESPONSES TO PHQ QUESTIONS 1-9: 25
5. POOR APPETITE OR OVEREATING: 3
SUM OF ALL RESPONSES TO PHQ QUESTIONS 1-9: 25
2. FEELING DOWN, DEPRESSED OR HOPELESS: 3

## 2018-12-12 NOTE — ASSESSMENT & PLAN NOTE
Mood still down. \"I've been overeating\". Exercising daily. Denies thoughts of hurting herself. She does not feel that the effexor has helped. She was previously on wellbutrin and prozac. Wellbutrin \"probably the only thing that has ever worked with me\". She is wanting to try wellbutrin again.

## 2018-12-12 NOTE — PROGRESS NOTES
kg/m²     Physical Exam   Constitutional: She is oriented to person, place, and time. She appears well-developed and well-nourished. HENT:   Head: Normocephalic and atraumatic. Neck: Normal range of motion. Neck supple. Cardiovascular: Normal rate, regular rhythm and normal heart sounds. Exam reveals no gallop and no friction rub. No murmur heard. Pulmonary/Chest: Effort normal and breath sounds normal. No respiratory distress. She has no wheezes. She has no rhonchi. She has no rales. Abdominal: Soft. Bowel sounds are normal.   Musculoskeletal: Normal range of motion. She exhibits no edema. Neurological: She is alert and oriented to person, place, and time. Skin: Skin is warm and dry. Psychiatric: Her speech is normal and behavior is normal. Thought content normal. She exhibits a depressed mood. She expresses no homicidal and no suicidal ideation. ASSESSMENT/PLAN:    1. Depression, unspecified depression type  Start wellbutrin. Stop effexor. Counseled on when to return to office earlier. If develop thoughts of hurting self should call [facility] or suicide hotline.   - buPROPion (WELLBUTRIN XL) 150 MG extended release tablet; Take 1 tablet by mouth every morning  Dispense: 30 tablet; Refill: 0    2. Calculus of gallbladder with chronic cholecystitis without obstruction  Keep appointment for surgery tomorrow    3. Anxiety  Continue buspar    4. Migraine without aura and without status migrainosus, not intractable  Start inderal--return in one month  - propranolol (INDERAL LA) 80 MG extended release capsule; Take 1 capsule by mouth daily  Dispense: 30 capsule; Refill: 1    5. Non morbid obesity due to excess calories  Continue regular physical activity. Return in about 4 weeks (around 1/9/2019).       (Please note that portions of this note may have been completed with a voice recognition program. Efforts were made to edit the dictations but occasionally words aremis-transcribed.)

## 2019-01-04 PROBLEM — Z90.49 S/P LAPAROSCOPIC CHOLECYSTECTOMY: Status: ACTIVE | Noted: 2019-01-04

## 2019-01-09 ENCOUNTER — OFFICE VISIT (OUTPATIENT)
Dept: FAMILY MEDICINE CLINIC | Age: 24
End: 2019-01-09
Payer: MEDICAID

## 2019-01-09 VITALS
DIASTOLIC BLOOD PRESSURE: 70 MMHG | RESPIRATION RATE: 14 BRPM | WEIGHT: 205.8 LBS | HEART RATE: 81 BPM | SYSTOLIC BLOOD PRESSURE: 104 MMHG | BODY MASS INDEX: 36.46 KG/M2

## 2019-01-09 DIAGNOSIS — G43.009 MIGRAINE WITHOUT AURA AND WITHOUT STATUS MIGRAINOSUS, NOT INTRACTABLE: Primary | ICD-10-CM

## 2019-01-09 DIAGNOSIS — F32.A DEPRESSION, UNSPECIFIED DEPRESSION TYPE: ICD-10-CM

## 2019-01-09 DIAGNOSIS — F41.9 ANXIETY: ICD-10-CM

## 2019-01-09 DIAGNOSIS — E66.09 NON MORBID OBESITY DUE TO EXCESS CALORIES: ICD-10-CM

## 2019-01-09 PROCEDURE — 99214 OFFICE O/P EST MOD 30 MIN: CPT | Performed by: NURSE PRACTITIONER

## 2019-01-09 PROCEDURE — G8417 CALC BMI ABV UP PARAM F/U: HCPCS | Performed by: NURSE PRACTITIONER

## 2019-01-09 PROCEDURE — G8427 DOCREV CUR MEDS BY ELIG CLIN: HCPCS | Performed by: NURSE PRACTITIONER

## 2019-01-09 PROCEDURE — 1036F TOBACCO NON-USER: CPT | Performed by: NURSE PRACTITIONER

## 2019-01-09 PROCEDURE — G8484 FLU IMMUNIZE NO ADMIN: HCPCS | Performed by: NURSE PRACTITIONER

## 2019-01-09 RX ORDER — SERTRALINE HYDROCHLORIDE 25 MG/1
25 TABLET, FILM COATED ORAL DAILY
Qty: 30 TABLET | Refills: 1 | Status: SHIPPED | OUTPATIENT
Start: 2019-01-09 | End: 2019-02-06 | Stop reason: SDUPTHER

## 2019-01-09 RX ORDER — BUSPIRONE HYDROCHLORIDE 10 MG/1
TABLET ORAL
Qty: 30 TABLET | Refills: 5 | Status: SHIPPED | OUTPATIENT
Start: 2019-01-09 | End: 2020-07-10 | Stop reason: SDUPTHER

## 2019-01-09 RX ORDER — BUPROPION HYDROCHLORIDE 150 MG/1
150 TABLET ORAL EVERY MORNING
Qty: 30 TABLET | Refills: 5 | Status: SHIPPED | OUTPATIENT
Start: 2019-01-09 | End: 2019-09-21 | Stop reason: SDUPTHER

## 2019-01-09 RX ORDER — TOPIRAMATE 25 MG/1
25 TABLET ORAL NIGHTLY
Qty: 30 TABLET | Refills: 1 | Status: SHIPPED | OUTPATIENT
Start: 2019-01-09 | End: 2019-02-06 | Stop reason: SDUPTHER

## 2019-01-09 ASSESSMENT — ENCOUNTER SYMPTOMS
COUGH: 0
CONSTIPATION: 0
VOMITING: 0
DIARRHEA: 0
NAUSEA: 0
ABDOMINAL PAIN: 0
CHEST TIGHTNESS: 0
SHORTNESS OF BREATH: 0
WHEEZING: 0

## 2019-01-09 ASSESSMENT — PATIENT HEALTH QUESTIONNAIRE - PHQ9
1. LITTLE INTEREST OR PLEASURE IN DOING THINGS: 1
SUM OF ALL RESPONSES TO PHQ QUESTIONS 1-9: 2
SUM OF ALL RESPONSES TO PHQ QUESTIONS 1-9: 2
2. FEELING DOWN, DEPRESSED OR HOPELESS: 1
SUM OF ALL RESPONSES TO PHQ9 QUESTIONS 1 & 2: 2

## 2019-01-09 ASSESSMENT — ANXIETY QUESTIONNAIRES
5. BEING SO RESTLESS THAT IT IS HARD TO SIT STILL: 3-NEARLY EVERY DAY
GAD7 TOTAL SCORE: 15
1. FEELING NERVOUS, ANXIOUS, OR ON EDGE: 1-SEVERAL DAYS
6. BECOMING EASILY ANNOYED OR IRRITABLE: 3-NEARLY EVERY DAY
3. WORRYING TOO MUCH ABOUT DIFFERENT THINGS: 1-SEVERAL DAYS
4. TROUBLE RELAXING: 3-NEARLY EVERY DAY
2. NOT BEING ABLE TO STOP OR CONTROL WORRYING: 2-OVER HALF THE DAYS
7. FEELING AFRAID AS IF SOMETHING AWFUL MIGHT HAPPEN: 2-OVER HALF THE DAYS

## 2019-02-06 ENCOUNTER — OFFICE VISIT (OUTPATIENT)
Dept: FAMILY MEDICINE CLINIC | Age: 24
End: 2019-02-06
Payer: MEDICAID

## 2019-02-06 VITALS
WEIGHT: 199 LBS | HEART RATE: 96 BPM | BODY MASS INDEX: 35.25 KG/M2 | RESPIRATION RATE: 16 BRPM | SYSTOLIC BLOOD PRESSURE: 114 MMHG | DIASTOLIC BLOOD PRESSURE: 70 MMHG

## 2019-02-06 DIAGNOSIS — G43.009 MIGRAINE WITHOUT AURA AND WITHOUT STATUS MIGRAINOSUS, NOT INTRACTABLE: ICD-10-CM

## 2019-02-06 DIAGNOSIS — F41.9 ANXIETY: Primary | ICD-10-CM

## 2019-02-06 DIAGNOSIS — F32.A DEPRESSION, UNSPECIFIED DEPRESSION TYPE: ICD-10-CM

## 2019-02-06 DIAGNOSIS — E66.09 NON MORBID OBESITY DUE TO EXCESS CALORIES: ICD-10-CM

## 2019-02-06 PROBLEM — K80.10 CALCULUS OF GALLBLADDER WITH CHRONIC CHOLECYSTITIS WITHOUT OBSTRUCTION: Status: RESOLVED | Noted: 2018-12-03 | Resolved: 2019-02-06

## 2019-02-06 PROCEDURE — 99213 OFFICE O/P EST LOW 20 MIN: CPT | Performed by: NURSE PRACTITIONER

## 2019-02-06 PROCEDURE — 1036F TOBACCO NON-USER: CPT | Performed by: NURSE PRACTITIONER

## 2019-02-06 PROCEDURE — G8427 DOCREV CUR MEDS BY ELIG CLIN: HCPCS | Performed by: NURSE PRACTITIONER

## 2019-02-06 PROCEDURE — G8484 FLU IMMUNIZE NO ADMIN: HCPCS | Performed by: NURSE PRACTITIONER

## 2019-02-06 PROCEDURE — G8417 CALC BMI ABV UP PARAM F/U: HCPCS | Performed by: NURSE PRACTITIONER

## 2019-02-06 RX ORDER — TOPIRAMATE 50 MG/1
50 TABLET, FILM COATED ORAL DAILY
Qty: 30 TABLET | Refills: 1 | Status: SHIPPED | OUTPATIENT
Start: 2019-02-06 | End: 2019-05-14 | Stop reason: SDUPTHER

## 2019-02-06 ASSESSMENT — ENCOUNTER SYMPTOMS
ABDOMINAL PAIN: 0
CHEST TIGHTNESS: 0
VOMITING: 0
WHEEZING: 0
SHORTNESS OF BREATH: 0
NAUSEA: 0
DIARRHEA: 1
CONSTIPATION: 0
COUGH: 0

## 2019-02-06 ASSESSMENT — ANXIETY QUESTIONNAIRES
1. FEELING NERVOUS, ANXIOUS, OR ON EDGE: 1-SEVERAL DAYS
6. BECOMING EASILY ANNOYED OR IRRITABLE: 1-SEVERAL DAYS
7. FEELING AFRAID AS IF SOMETHING AWFUL MIGHT HAPPEN: 0-NOT AT ALL SURE
5. BEING SO RESTLESS THAT IT IS HARD TO SIT STILL: 0-NOT AT ALL SURE
2. NOT BEING ABLE TO STOP OR CONTROL WORRYING: 0-NOT AT ALL SURE
GAD7 TOTAL SCORE: 6
3. WORRYING TOO MUCH ABOUT DIFFERENT THINGS: 1-SEVERAL DAYS
4. TROUBLE RELAXING: 3-NEARLY EVERY DAY

## 2019-02-06 ASSESSMENT — PATIENT HEALTH QUESTIONNAIRE - PHQ9
SUM OF ALL RESPONSES TO PHQ QUESTIONS 1-9: 2
2. FEELING DOWN, DEPRESSED OR HOPELESS: 1
1. LITTLE INTEREST OR PLEASURE IN DOING THINGS: 1
SUM OF ALL RESPONSES TO PHQ QUESTIONS 1-9: 2
SUM OF ALL RESPONSES TO PHQ9 QUESTIONS 1 & 2: 2

## 2019-03-06 ENCOUNTER — OFFICE VISIT (OUTPATIENT)
Dept: FAMILY MEDICINE CLINIC | Age: 24
End: 2019-03-06
Payer: MEDICAID

## 2019-03-06 VITALS
HEART RATE: 83 BPM | DIASTOLIC BLOOD PRESSURE: 78 MMHG | SYSTOLIC BLOOD PRESSURE: 118 MMHG | RESPIRATION RATE: 16 BRPM | BODY MASS INDEX: 34.72 KG/M2 | WEIGHT: 196 LBS

## 2019-03-06 DIAGNOSIS — E66.09 NON MORBID OBESITY DUE TO EXCESS CALORIES: ICD-10-CM

## 2019-03-06 DIAGNOSIS — G43.009 MIGRAINE WITHOUT AURA AND WITHOUT STATUS MIGRAINOSUS, NOT INTRACTABLE: Primary | ICD-10-CM

## 2019-03-06 DIAGNOSIS — F32.A DEPRESSION, UNSPECIFIED DEPRESSION TYPE: ICD-10-CM

## 2019-03-06 DIAGNOSIS — F41.9 ANXIETY: ICD-10-CM

## 2019-03-06 PROCEDURE — 99213 OFFICE O/P EST LOW 20 MIN: CPT | Performed by: NURSE PRACTITIONER

## 2019-03-06 PROCEDURE — 1036F TOBACCO NON-USER: CPT | Performed by: NURSE PRACTITIONER

## 2019-03-06 PROCEDURE — G8484 FLU IMMUNIZE NO ADMIN: HCPCS | Performed by: NURSE PRACTITIONER

## 2019-03-06 PROCEDURE — G8417 CALC BMI ABV UP PARAM F/U: HCPCS | Performed by: NURSE PRACTITIONER

## 2019-03-06 PROCEDURE — G8427 DOCREV CUR MEDS BY ELIG CLIN: HCPCS | Performed by: NURSE PRACTITIONER

## 2019-03-06 ASSESSMENT — ENCOUNTER SYMPTOMS
WHEEZING: 0
VOMITING: 0
COUGH: 0
ABDOMINAL PAIN: 0
NAUSEA: 0
CONSTIPATION: 0
DIARRHEA: 0
SHORTNESS OF BREATH: 0
CHEST TIGHTNESS: 0

## 2019-07-15 DIAGNOSIS — F41.9 ANXIETY: ICD-10-CM

## 2019-07-15 DIAGNOSIS — F32.A DEPRESSION, UNSPECIFIED DEPRESSION TYPE: ICD-10-CM

## 2019-11-17 ENCOUNTER — APPOINTMENT (OUTPATIENT)
Dept: CT IMAGING | Age: 24
End: 2019-11-17
Payer: COMMERCIAL

## 2019-11-17 ENCOUNTER — APPOINTMENT (OUTPATIENT)
Dept: GENERAL RADIOLOGY | Age: 24
End: 2019-11-17
Payer: COMMERCIAL

## 2019-11-17 ENCOUNTER — HOSPITAL ENCOUNTER (EMERGENCY)
Age: 24
Discharge: HOME | End: 2019-11-17
Payer: COMMERCIAL

## 2019-11-17 VITALS
TEMPERATURE: 98.3 F | HEIGHT: 63 IN | HEART RATE: 99 BPM | RESPIRATION RATE: 18 BRPM | SYSTOLIC BLOOD PRESSURE: 131 MMHG | BODY MASS INDEX: 39.51 KG/M2 | OXYGEN SATURATION: 98 % | DIASTOLIC BLOOD PRESSURE: 65 MMHG | WEIGHT: 223 LBS

## 2019-11-17 DIAGNOSIS — T14.8XXA HEMATOMA: ICD-10-CM

## 2019-11-17 DIAGNOSIS — R10.9 ABDOMINAL PAIN, UNSPECIFIED ABDOMINAL LOCATION: ICD-10-CM
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
## 2019-11-17 DIAGNOSIS — S89.91XA INJURY OF RIGHT KNEE, INITIAL ENCOUNTER: ICD-10-CM

## 2019-11-17 DIAGNOSIS — S16.1XXA STRAIN OF NECK MUSCLE, INITIAL ENCOUNTER: ICD-10-CM

## 2019-11-17 PROCEDURE — 6370000000 HC RX 637 (ALT 250 FOR IP): Performed by: PHYSICIAN ASSISTANT

## 2019-11-17 PROCEDURE — 99284 EMERGENCY DEPT VISIT MOD MDM: CPT

## 2019-11-17 PROCEDURE — 73560 X-RAY EXAM OF KNEE 1 OR 2: CPT

## 2019-11-17 PROCEDURE — 72125 CT NECK SPINE W/O DYE: CPT

## 2019-11-17 RX ORDER — NAPROXEN 500 MG/1
500 TABLET ORAL 2 TIMES DAILY PRN
Qty: 30 TABLET | Refills: 0 | Status: SHIPPED | OUTPATIENT
Start: 2019-11-17 | End: 2020-07-13

## 2019-11-17 RX ORDER — METHOCARBAMOL 500 MG/1
500 TABLET, FILM COATED ORAL 4 TIMES DAILY
Qty: 20 TABLET | Refills: 0 | Status: SHIPPED | OUTPATIENT
Start: 2019-11-17 | End: 2020-07-13

## 2019-11-17 RX ORDER — OXYCODONE HYDROCHLORIDE AND ACETAMINOPHEN 5; 325 MG/1; MG/1
1 TABLET ORAL ONCE
Status: COMPLETED | OUTPATIENT
Start: 2019-11-17 | End: 2019-11-17

## 2019-11-17 RX ADMIN — OXYCODONE HYDROCHLORIDE AND ACETAMINOPHEN 1 TABLET: 5; 325 TABLET ORAL at 19:15

## 2019-11-17 ASSESSMENT — PAIN SCALES - GENERAL: PAINLEVEL_OUTOF10: 10

## 2020-05-04 ENCOUNTER — HOSPITAL ENCOUNTER (OUTPATIENT)
Age: 25
Setting detail: SPECIMEN
Discharge: HOME | End: 2020-05-04
Payer: COMMERCIAL

## 2020-05-04 ENCOUNTER — OFFICE VISIT (OUTPATIENT)
Dept: PRIMARY CARE CLINIC | Age: 25
End: 2020-05-04
Payer: COMMERCIAL

## 2020-05-04 VITALS — OXYGEN SATURATION: 99 % | TEMPERATURE: 97.6 F | HEART RATE: 111 BPM

## 2020-05-04 PROCEDURE — G8428 CUR MEDS NOT DOCUMENT: HCPCS | Performed by: FAMILY MEDICINE

## 2020-05-04 PROCEDURE — G8417 CALC BMI ABV UP PARAM F/U: HCPCS | Performed by: FAMILY MEDICINE

## 2020-05-04 PROCEDURE — 99213 OFFICE O/P EST LOW 20 MIN: CPT | Performed by: FAMILY MEDICINE

## 2020-05-04 PROCEDURE — U0002 COVID-19 LAB TEST NON-CDC: HCPCS

## 2020-05-04 PROCEDURE — 1036F TOBACCO NON-USER: CPT | Performed by: FAMILY MEDICINE

## 2020-05-04 RX ORDER — PREDNISONE 10 MG/1
10 TABLET ORAL DAILY
Qty: 10 TABLET | Refills: 0 | Status: SHIPPED | OUTPATIENT
Start: 2020-05-04 | End: 2020-05-14

## 2020-05-04 RX ORDER — ONDANSETRON 4 MG/1
4 TABLET, FILM COATED ORAL 3 TIMES DAILY PRN
Qty: 15 TABLET | Refills: 0 | Status: SHIPPED | OUTPATIENT
Start: 2020-05-04 | End: 2020-07-13

## 2020-05-04 NOTE — PATIENT INSTRUCTIONS
-  Based on HPI and examination, you have stomach flu/[place] virus or COVID-19  infection.  - Your covid-19 testing result takes 5-6 days to come back. [address] will notify the test result or result can be viewed at [facility] [address]. - Fluid hydration with plain water was advised. Mix Gatorade with Pedialyte. I also recommend plane yogurt. - Short course of oral Prednisone to reduce inflammation and Zofran for nausea/Vomiting  - Please continue taking antibiotic prescribed by your PCP.  - OTC cough medication, Zarbees with dark honey was suggested. - Stay at home and self quarantine until COVID-19 test results are back. If  COVID-19 test result is positive, 14 self quarantine is recommended. - Please return to clinic or call us if symptoms are worsened.

## 2020-05-06 LAB
SARS-COV-2: NOT DETECTED
SOURCE: NORMAL

## 2020-07-13 ENCOUNTER — TELEMEDICINE (OUTPATIENT)
Dept: FAMILY MEDICINE CLINIC | Age: 25
End: 2020-07-13
Payer: MEDICAID

## 2020-07-13 PROCEDURE — G8427 DOCREV CUR MEDS BY ELIG CLIN: HCPCS | Performed by: NURSE PRACTITIONER

## 2020-07-13 PROCEDURE — 99213 OFFICE O/P EST LOW 20 MIN: CPT | Performed by: NURSE PRACTITIONER

## 2020-07-13 RX ORDER — EPINEPHRINE 0.3 MG/.3ML
INJECTION SUBCUTANEOUS
Qty: 2 EACH | Refills: 0 | Status: SHIPPED | OUTPATIENT
Start: 2020-07-13 | End: 2021-08-18 | Stop reason: SDUPTHER

## 2020-07-13 RX ORDER — BUSPIRONE HYDROCHLORIDE 10 MG/1
TABLET ORAL
Qty: 30 TABLET | Refills: 5 | Status: SHIPPED | OUTPATIENT
Start: 2020-07-13 | End: 2021-06-03

## 2020-07-13 ASSESSMENT — ANXIETY QUESTIONNAIRES
2. NOT BEING ABLE TO STOP OR CONTROL WORRYING: 0-NOT AT ALL
4. TROUBLE RELAXING: 2-OVER HALF THE DAYS
GAD7 TOTAL SCORE: 14
7. FEELING AFRAID AS IF SOMETHING AWFUL MIGHT HAPPEN: 1-SEVERAL DAYS
6. BECOMING EASILY ANNOYED OR IRRITABLE: 2-OVER HALF THE DAYS
1. FEELING NERVOUS, ANXIOUS, OR ON EDGE: 3-NEARLY EVERY DAY
3. WORRYING TOO MUCH ABOUT DIFFERENT THINGS: 3-NEARLY EVERY DAY
5. BEING SO RESTLESS THAT IT IS HARD TO SIT STILL: 3-NEARLY EVERY DAY

## 2020-07-13 ASSESSMENT — ENCOUNTER SYMPTOMS
WHEEZING: 0
CONSTIPATION: 0
DIARRHEA: 1
SHORTNESS OF BREATH: 0
NAUSEA: 0
VOMITING: 0
CHEST TIGHTNESS: 0
ABDOMINAL PAIN: 0
COUGH: 0

## 2020-07-13 ASSESSMENT — PATIENT HEALTH QUESTIONNAIRE - PHQ9
SUM OF ALL RESPONSES TO PHQ9 QUESTIONS 1 & 2: 0
SUM OF ALL RESPONSES TO PHQ QUESTIONS 1-9: 0
1. LITTLE INTEREST OR PLEASURE IN DOING THINGS: 0
SUM OF ALL RESPONSES TO PHQ QUESTIONS 1-9: 0
2. FEELING DOWN, DEPRESSED OR HOPELESS: 0

## 2020-07-13 NOTE — ASSESSMENT & PLAN NOTE
last July. Now engaged and living in new house. 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 She has been out of medication since May. Starting to feel more on edge and short fused. She is going to the gym daily. GERSON 7 SCORE 7/13/2020 2/6/2019 1/9/2019   GERSON-7 Total Score 14 6 15     Interpretation of GERSON-7 score: 5-9 = mild anxiety, 10-14 = moderate anxiety, 15+ = severe anxiety. Recommend referral to behavioral health for scores 10 or greater.

## 2020-07-13 NOTE — ASSESSMENT & PLAN NOTE
\"not like they used to be\". Two migraines in the last month. No topamax in the last month. She will get tension headaches. Trying to stay hydrated. Avoids pop. She has excedrin that improves symptoms.

## 2020-07-13 NOTE — PROGRESS NOTES
2020    TELEHEALTH EVALUATION -- Audio/Visual (During PVGLN-99 public health emergency)    Due to COVID-19 related state of emergency restrictions , as an alternative to an in-person session, the clinical decision was made to utilize a virtual visit to provide services for this patient's visit. These services were provided via [employer] with the patient in their home while I was located at [place] [address] and [facility]. Identity was confirmed via patient name and . Verbal consent for use of telehealth was provided to and completed by the patient. HPI:    Ariella Harrington (:  1995) has requested an audio/video evaluation for the following concern(s):    Chief Complaint   Patient presents with    Anxiety    Depression     Migraine without aura and without status migrainosus, not intractable  \"not like they used to be\". Two migraines in the last month. No topamax in the last month. She will get tension headaches. Trying to stay hydrated. Avoids pop. She has excedrin that improves symptoms. Depression  PHQ Scores 2018   PHQ2 Score 0 2 2 6 6 6 0   PHQ9 Score 0 2 2 25 25 25 0     Interpretation of Total Score Depression Severity: 1-4 = Minimal depression, 5-9 = Mild depression, 10-14 = Moderate depression, 15-19 = Moderately severe depression, 20-27 = Severe depression      Non morbid obesity due to excess calories  Going to gym regularly    Anxiety    last July. Now engaged and living in new house. 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 She has been out of medication since May. Starting to feel more on edge and short fused. She is going to the gym daily. GERSON 7 SCORE 2020   GERSON-7 Total Score 14 6 15     Interpretation of GERSON-7 score: 5-9 = mild anxiety, 10-14 = moderate anxiety, 15+ = severe anxiety.  Recommend referral to behavioral health for scores 10 or greater. Review of Systems   Constitutional: Negative for appetite change, chills, fatigue and unexpected weight change. Respiratory: Negative for cough, chest tightness, shortness of breath and wheezing. Cardiovascular: Negative for chest pain, palpitations and leg swelling. Gastrointestinal: Positive for diarrhea. Negative for abdominal pain, constipation, nausea and vomiting. Musculoskeletal: Negative for arthralgias. Neurological: Positive for headaches. Negative for weakness and numbness. Hematological: Negative for adenopathy. Psychiatric/Behavioral: Positive for sleep disturbance. Negative for suicidal ideas. The patient is nervous/anxious. Prior to Visit Medications    Medication Sig Taking?  Authorizing Provider   sertraline (ZOLOFT) 50 MG tablet Take 1 tablet by mouth daily Yes MIREYA Valenzuela CNP   busPIRone (BUSPAR) 10 MG tablet TAKE ONE-HALF TABLET BY MOUTH THREE TIMES A DAY AS NEEDED FOR ANXIETY Yes MIREYA Valenzuela CNP   EPINEPHrine (EPIPEN 2-CLEOPATRA) 0.3 MG/0.3ML SOAJ injection Use as directed for allergic reaction Yes MIREYA Valenzuela CNP       Allergies   Allergen Reactions    Latex Hives and Rash    Macrobid [Nitrofurantoin Macrocrystal] Hives     No SOB or closing of throat noted when pt had reaction      Norco [Hydrocodone-Acetaminophen]      Makes her really sick to her stomach; hard to wake up with,it keeps her asleep    Nuvaring [Etonogestrel-Ethinyl Estradiol] Other (See Comments)     Severe cramping and bleeding    Other      Prolene suture    Ropivacaine     Tape Joycie Flax Tape] Rash       Social History     Tobacco Use    Smoking status: Never Smoker    Smokeless tobacco: Never Used   Substance Use Topics    Alcohol use: No    Drug use: No      Past Medical History:   Diagnosis Date    37 weeks gestation of pregnancy 6/28/2016    Anemia     History of postpartum hemorrhage, currently pregnant in third trimester 6/28/2016    Migraines  Preeclampsia 2014    Sciatica      Past Surgical History:   Procedure Laterality Date    CHOLECYSTECTOMY  12/13/2018    DENTAL SURGERY      wisdom teeth    HYSTERECTOMY      SALPINGECTOMY           PHYSICAL EXAMINATION:    Vital Signs: (As obtained by patient/caregiver or practitioner observation)    Blood pressure-  Heart rate-    Respiratory rate-    Temperature-  Pulse oximetry-     Physical Exam  Constitutional:       Appearance: Normal appearance. She is not ill-appearing. HENT:      Head: Normocephalic and atraumatic. Right Ear: Hearing and external ear normal.      Left Ear: Hearing and external ear normal.      Mouth/Throat:      Mouth: Mucous membranes are moist.   Eyes:      Extraocular Movements: Extraocular movements intact. Neck:      Musculoskeletal: Normal range of motion. Pulmonary:      Effort: Pulmonary effort is normal.      Comments: No visualized signs of difficulty breathing  Skin:     Comments: No significant exanthematous lesions or discoloration noted on facial skin   Neurological:      Mental Status: She is alert and oriented to person, place, and time. Cranial Nerves: No facial asymmetry. Psychiatric:         Mood and Affect: Affect normal. Mood is anxious. Mood is not depressed. Speech: Speech normal.         Behavior: Behavior is cooperative. Thought Content: Thought content normal. Thought content does not include homicidal or suicidal ideation. Thought content does not include homicidal or suicidal plan. Other pertinent observable physical exam findings-     Due to this being a TeleHealth encounter, evaluation of the following organ systems is limited: Vitals/Constitutional/EENT/Resp/CV/GI//MS/Neuro/Skin/Heme-Lymph-Imm. ASSESSMENT/PLAN:  1. Migraine without aura and without status migrainosus, not intractable  Controlled without topamax. Continue increased water and excedrin PRN    2.  Depression, unspecified depression type  Restart zoloft. Follow up in 4-6 weeks  - sertraline (ZOLOFT) 50 MG tablet; Take 1 tablet by mouth daily  Dispense: 30 tablet; Refill: 5    3. Anxiety  Restart zoloft. buspar PRN. Follow up in 4-6 weeks  - sertraline (ZOLOFT) 50 MG tablet; Take 1 tablet by mouth daily  Dispense: 30 tablet; Refill: 5  - busPIRone (BUSPAR) 10 MG tablet; TAKE ONE-HALF TABLET BY MOUTH THREE TIMES A DAY AS NEEDED FOR ANXIETY  Dispense: 30 tablet; Refill: 5    4. Latex allergy  - EPINEPHrine (EPIPEN 2-CLEOPATRA) 0.3 MG/0.3ML SOAJ injection; Use as directed for allergic reaction  Dispense: 2 each; Refill: 0    5. Non morbid obesity due to excess calories  The patient is asked to make an attempt to improve diet and exercise patterns to aid in medical management of this problem. Return in about 6 weeks (around 8/24/2020). An  electronic signature was used to authenticate this note. --MIREYA Rankin - CNP on 7/13/2020 at 3:52 PM             Pursuant to the emergency declaration under the [facility], 1135 waiver authority and the [employer] Act, this Virtual  Visit was conducted, with patient's consent, to reduce the patient's risk of exposure to COVID-19 and provide continuity of care for an established patient. Services were provided through a video synchronous discussion virtually to substitute for in-person clinic visit.         (Please note that portions of this note may have been completed with a voice recognition program. Efforts were made to edit the dictations but occasionally words aremis-transcribed.)

## 2020-07-13 NOTE — ASSESSMENT & PLAN NOTE
PHQ Scores 7/13/2020 2/6/2019 1/9/2019 12/12/2018 11/14/2018 11/7/2018 5/1/2017   PHQ2 Score 0 2 2 6 6 6 0   PHQ9 Score 0 2 2 25 25 25 0     Interpretation of Total Score Depression Severity: 1-4 = Minimal depression, 5-9 = Mild depression, 10-14 = Moderate depression, 15-19 = Moderately severe depression, 20-27 = Severe depression

## 2020-08-02 ENCOUNTER — HOSPITAL ENCOUNTER (EMERGENCY)
Age: 25
Discharge: HOME | End: 2020-08-02
Attending: EMERGENCY MEDICINE
Payer: MEDICAID

## 2020-08-02 VITALS
HEART RATE: 79 BPM | SYSTOLIC BLOOD PRESSURE: 106 MMHG | HEIGHT: 63 IN | DIASTOLIC BLOOD PRESSURE: 55 MMHG | WEIGHT: 220 LBS | BODY MASS INDEX: 38.98 KG/M2 | TEMPERATURE: 98.7 F | OXYGEN SATURATION: 99 % | RESPIRATION RATE: 16 BRPM

## 2020-08-02 PROCEDURE — 6360000002 HC RX W HCPCS: Performed by: PHYSICIAN ASSISTANT

## 2020-08-02 PROCEDURE — 6370000000 HC RX 637 (ALT 250 FOR IP): Performed by: PHYSICIAN ASSISTANT

## 2020-08-02 PROCEDURE — 96372 THER/PROPH/DIAG INJ SC/IM: CPT

## 2020-08-02 PROCEDURE — 99283 EMERGENCY DEPT VISIT LOW MDM: CPT

## 2020-08-02 RX ORDER — OXYCODONE HYDROCHLORIDE AND ACETAMINOPHEN 5; 325 MG/1; MG/1
1 TABLET ORAL ONCE
Status: COMPLETED | OUTPATIENT
Start: 2020-08-02 | End: 2020-08-02

## 2020-08-02 RX ORDER — PREDNISONE 50 MG/1
50 TABLET ORAL DAILY
Qty: 7 TABLET | Refills: 0 | Status: SHIPPED | OUTPATIENT
Start: 2020-08-02 | End: 2020-08-09

## 2020-08-02 RX ORDER — KETOROLAC TROMETHAMINE 30 MG/ML
30 INJECTION, SOLUTION INTRAMUSCULAR; INTRAVENOUS ONCE
Status: COMPLETED | OUTPATIENT
Start: 2020-08-02 | End: 2020-08-02

## 2020-08-02 RX ORDER — MORPHINE SULFATE 4 MG/ML
4 INJECTION, SOLUTION INTRAMUSCULAR; INTRAVENOUS ONCE
Status: COMPLETED | OUTPATIENT
Start: 2020-08-02 | End: 2020-08-02

## 2020-08-02 RX ORDER — METHOCARBAMOL 500 MG/1
500 TABLET, FILM COATED ORAL 4 TIMES DAILY
Qty: 20 TABLET | Refills: 0 | Status: ON HOLD | OUTPATIENT
Start: 2020-08-02 | End: 2021-04-12

## 2020-08-02 RX ORDER — NAPROXEN 500 MG/1
500 TABLET ORAL 2 TIMES DAILY PRN
Qty: 30 TABLET | Refills: 0 | Status: ON HOLD | OUTPATIENT
Start: 2020-08-02 | End: 2021-04-12

## 2020-08-02 RX ORDER — DIAZEPAM 5 MG/1
5 TABLET ORAL ONCE
Status: COMPLETED | OUTPATIENT
Start: 2020-08-02 | End: 2020-08-02

## 2020-08-02 RX ADMIN — PREDNISONE 50 MG: 20 TABLET ORAL at 17:20

## 2020-08-02 RX ADMIN — OXYCODONE HYDROCHLORIDE AND ACETAMINOPHEN 1 TABLET: 5; 325 TABLET ORAL at 17:21

## 2020-08-02 RX ADMIN — MORPHINE SULFATE 4 MG: 4 INJECTION, SOLUTION INTRAMUSCULAR; INTRAVENOUS at 18:50

## 2020-08-02 RX ADMIN — KETOROLAC TROMETHAMINE 30 MG: 30 INJECTION, SOLUTION INTRAMUSCULAR; INTRAVENOUS at 17:21

## 2020-08-02 RX ADMIN — DIAZEPAM 5 MG: 5 TABLET ORAL at 17:21

## 2020-08-02 ASSESSMENT — PAIN DESCRIPTION - ORIENTATION: ORIENTATION: MID;LOWER

## 2020-08-02 ASSESSMENT — PAIN DESCRIPTION - PROGRESSION: CLINICAL_PROGRESSION: GRADUALLY IMPROVING

## 2020-08-02 ASSESSMENT — PAIN DESCRIPTION - DESCRIPTORS: DESCRIPTORS: BURNING;SPASM;STABBING

## 2020-08-02 ASSESSMENT — PAIN DESCRIPTION - LOCATION
LOCATION: BACK
LOCATION: BACK;COCCYX
LOCATION: BACK

## 2020-08-02 ASSESSMENT — PAIN DESCRIPTION - FREQUENCY
FREQUENCY: CONTINUOUS
FREQUENCY: CONTINUOUS

## 2020-08-02 ASSESSMENT — PAIN SCALES - GENERAL
PAINLEVEL_OUTOF10: 7
PAINLEVEL_OUTOF10: 9
PAINLEVEL_OUTOF10: 10
PAINLEVEL_OUTOF10: 7

## 2020-08-02 ASSESSMENT — PAIN DESCRIPTION - PAIN TYPE
TYPE: ACUTE PAIN
TYPE: ACUTE PAIN

## 2020-08-02 NOTE — ED TRIAGE NOTES
Patient was getting out of the shower and started to get dressed and was unable to finish. Patient states that the pain int he lower portion of the back. She states that it felt like a spasm in the back and has not gone away. Pat[place] states that her boyfriend had to help her get dressed. Patient states that her pain is radiating into her right foot. Patient is able to move her feet and toes without difficulty.

## 2020-08-02 NOTE — ED PROVIDER NOTES
As physician-in-triage, I performed a medical screening history and physical exam on this patient. HISTORY OF PRESENT ILLNESS  Shaheed Molina is a 30 y.o. female who was getting out of the shower today and she had a \"muscle spasm\" in her lower back and right leg. She denies injury or trauma. She denies urinary symptoms, saddle anesthesia, or fecal/urinary incontinence. Analgesia offered but patient declined. PHYSICAL EXAM  /84   Pulse 85   Temp 98.7 °F (37.1 °C) (Oral)   Resp 18   Ht 5' 3\" (1.6 m)   Wt 220 lb (99.8 kg)   LMP 10/18/2018 (Approximate)   SpO2 97%   BMI 38.97 kg/m²     On exam, the patient appears in no acute distress. Speech is clear. Breathing is unlabored. Moves all extremities    Comment: Please note this report has been produced using speech recognition software and may contain errors related to that system including errors in grammar, punctuation, and spelling, as well as words and phrases that may be inappropriate. If there are any questions or concerns please feel free to contact the dictating provider for clarification.        Sally Russo MD  08/02/20 [id number]

## 2020-08-02 NOTE — ED PROVIDER NOTES
eMERGENCY dEPARTMENT eNCOUnter      PCP: MIREYA Babin - CNP    CHIEF COMPLAINT    Chief Complaint   Patient presents with    Back Pain       HPI    Frank Peacock is a 30 y.o. female who presents with diffuse low back pain. Pain began after getting out of shower today, bending over to put on pants when she had acute onset pain. She states pain is intermittently radiating into bilateral lower extremities, right greater than left. Pain worsens with any movement and states that she has required her  Boyfriends help to get dressed and ambulate. She endorses history of back pain in the past.  No recent falls, injuries or trauma. Patient denies fever, chills, bowel/bladder incontinence, urine retention, saddle anesthesia, lower extremity weakness or altered sensation. Patient denies personal history of diabetes mellitus, spinal abnormalities, recent spinal trauma, IV drug use, or cancer. REVIEW OF SYSTEMS    Constitutional:  Denies fever, chills, weight loss, or weakness. Cardiovascular:  Denies chest pain, palpitations, or swelling  Respiratory:  Denies cough or shortness of breath    GI:  Denies abdominal pain, nausea, vomiting, or diarrhea  :  Denies any urinary symptoms or vaginal symptoms. Denies pregnancy. Musculoskeletal:  See HPI above   Skin:  Denies rash  Neurologic:   See HPI No bowel incontinence or bladder retention, no saddle anesthesia. +radicular symptoms. No lower extremity weakness or altered sensation.   Endocrine:  Denies polyuria or polydypsia   Lymphatic:  Denies swollen glands     All other review of systems are negative  See HPI and nursing notes for additional information       PAST MEDICAL & SURGICAL HISTORY    Past Medical History:   Diagnosis Date    37 weeks gestation of pregnancy 6/28/2016    Anemia     History of postpartum hemorrhage, currently pregnant in third trimester 6/28/2016    Migraines     Preeclampsia 2014    Sciatica      Past Surgical History: Procedure Laterality Date    CHOLECYSTECTOMY  12/13/2018    DENTAL SURGERY      wisdom teeth    HYSTERECTOMY      SALPINGECTOMY         CURRENT MEDICATIONS    Current Outpatient Rx   Medication Sig Dispense Refill    naproxen (NAPROSYN) 500 MG tablet Take 1 tablet by mouth 2 times daily as needed for Pain 30 tablet 0    methocarbamol (ROBAXIN) 500 MG tablet Take 1 tablet by mouth 4 times daily As needed for muscle spasm.  20 tablet 0    predniSONE (DELTASONE) 50 MG tablet Take 1 tablet by mouth daily for 7 days 7 tablet 0    sertraline (ZOLOFT) 50 MG tablet Take 1 tablet by mouth daily 30 tablet 5    busPIRone (BUSPAR) 10 MG tablet TAKE ONE-HALF TABLET BY MOUTH THREE TIMES A DAY AS NEEDED FOR ANXIETY 30 tablet 5    EPINEPHrine (EPIPEN 2-CLEOPATRA) 0.3 MG/0.3ML SOAJ injection Use as directed for allergic reaction 2 each 0       ALLERGIES    Allergies   Allergen Reactions    Latex Hives and Rash    Macrobid [Nitrofurantoin Macrocrystal] Hives     No SOB or closing of throat noted when pt had reaction      Norco [Hydrocodone-Acetaminophen]      Makes her really sick to her stomach; hard to wake up with,it keeps her asleep    Nuvaring [Etonogestrel-Ethinyl Estradiol] Other (See Comments)     Severe cramping and bleeding    Other      Prolene suture    Ropivacaine     [facility]] Rash       SOCIAL HISTORY    Social History     Socioeconomic History    Marital status:      Spouse name: None    Number of children: None    Years of education: None    Highest education level: None   Occupational History    None   Social Needs    Financial resource strain: None    Food insecurity     Worry: None     Inability: None    Transportation needs     Medical: None     Non-medical: None   Tobacco Use    Smoking status: Never Smoker    Smokeless tobacco: Never Used   Substance and Sexual Activity    Alcohol use: No    Drug use: No    Sexual activity: Yes     Partners: Male   Lifestyle    Physical activity     Days per week: None     Minutes per session: None    Stress: None   Relationships    Social connections     Talks on phone: None     Gets together: None     Attends Taoism service: None     Active member of club or organization: None     Attends meetings of clubs or organizations: None     Relationship status: None    Intimate partner violence     Fear of current or ex partner: None     Emotionally abused: None     Physically abused: None     Forced sexual activity: None   Other Topics Concern    None   Social History Narrative    None       PHYSICAL EXAM    VITAL SIGNS: BP (!) 106/55   Pulse 79   Temp 98.7 °F (37.1 °C) (Oral)   Resp 16   Ht 5' 3\" (1.6 m)   Wt 220 lb (99.8 kg)   LMP 10/18/2018 (Approximate)   SpO2 99%   BMI 38.97 kg/m²    Patient was noted to be afebrile    Constitutional:  Well developed, well nourished. HENT:  Atraumatic, moist mucus membranes. Eyes:  No orbital trauma. No scleral icterus. Neck: No JVD, supple. No enlarged lymph nodes. Cardiovascular:  Normal rate, regular rhythm, no murmurs/rubs/gallops  Respiratory:  No respiratory distress, normal breath sounds. Abdomen: Bowel sounds normal, abdomen soft, no tenderness, no masses. Musculoskeletal:  No edema, no deformities. Back:   - No gross deformity, swelling, or discoloration.    - + right sided Paralumbar tenderness without masses, fluctuance, warmth, or skin changes.  - No localized midline bony tenderness.   - No change in pain with forward flexion.  - SLR test positive on right    No CVA tenderness to percussion    Integument:  Well hydrated, no rash, no pallor. Neurologic:    Intact sensation lower leg, including nl sensation to light touch inner thigh, distal legs, foot  Sensation inner upper thigh normal  5/5 strength adduction thigh, flex/extension knee, foot dorsiflexion/extension, toe extension/curling toes.   2+ patellar reflexes ivan   Vascular:  Distal pulses and capillary incontinence or retention, changing or worsening pain) that necessitate immediate return. Clinical  IMPRESSION    1. Strain of lumbar region, initial encounter          Disposition referral (if applicable):  Kaylin Kelsey, APRN - CNP  [address] [facility]  [phone]    Schedule an appointment as soon as possible for a visit in 2 days  For recheck of symptoms treated for today    [facility] Emergency Department  [address] [id number]  [phone]  Go to   As needed, If symptoms worsen      Disposition medications (if applicable):  Discharge Medication List as of 8/2/2020  7:28 PM      START taking these medications    Details   naproxen (NAPROSYN) 500 MG tablet Take 1 tablet by mouth 2 times daily as needed for Pain, Disp-30 tablet,R-0Print      methocarbamol (ROBAXIN) 500 MG tablet Take 1 tablet by mouth 4 times daily As needed for muscle spasm., Disp-20 tablet,R-0Print      predniSONE (DELTASONE) 50 MG tablet Take 1 tablet by mouth daily for 7 days, Disp-7 tablet,R-0Print               Comment: Please note this report has been produced using speech recognition software and may contain errors related to that system including errors in grammar, punctuation, and spelling, as well as words and phrases that may be inappropriate. If there are any questions or concerns please feel free to contact the dictating provider for clarification.         ROXY Joiner  08/03/20 6255

## 2020-09-11 ENCOUNTER — TELEPHONE (OUTPATIENT)
Dept: FAMILY MEDICINE CLINIC | Age: 25
End: 2020-09-11

## 2020-09-11 ENCOUNTER — APPOINTMENT (OUTPATIENT)
Dept: GENERAL RADIOLOGY | Age: 25
End: 2020-09-11
Payer: MEDICAID

## 2020-09-11 ENCOUNTER — HOSPITAL ENCOUNTER (EMERGENCY)
Age: 25
Discharge: HOME | End: 2020-09-11
Payer: MEDICAID

## 2020-09-11 VITALS
OXYGEN SATURATION: 100 % | TEMPERATURE: 98.1 F | RESPIRATION RATE: 15 BRPM | DIASTOLIC BLOOD PRESSURE: 80 MMHG | HEART RATE: 85 BPM | SYSTOLIC BLOOD PRESSURE: 125 MMHG

## 2020-09-11 LAB
ALBUMIN SERPL-MCNC: 4.2 GM/DL (ref 3.4–5)
ALP BLD-CCNC: 69 IU/L (ref 40–129)
ALT SERPL-CCNC: 34 U/L (ref 10–40)
ANION GAP SERPL CALCULATED.3IONS-SCNC: 13 MMOL/L (ref 4–16)
AST SERPL-CCNC: 25 IU/L (ref 15–37)
BASOPHILS ABSOLUTE: 0 K/CU MM
BASOPHILS RELATIVE PERCENT: 0.4 % (ref 0–1)
BILIRUB SERPL-MCNC: 0.2 MG/DL (ref 0–1)
BUN BLDV-MCNC: 15 MG/DL (ref 6–23)
CALCIUM SERPL-MCNC: 9.8 MG/DL (ref 8.3–10.6)
CHLORIDE BLD-SCNC: 102 MMOL/L (ref 99–110)
CO2: 23 MMOL/L (ref 21–32)
CREAT SERPL-MCNC: 0.8 MG/DL (ref 0.6–1.1)
D DIMER: <200 NG/ML(DDU)
DIFFERENTIAL TYPE: ABNORMAL
EKG ATRIAL RATE: 90 BPM
EKG DIAGNOSIS: NORMAL
EKG P AXIS: 39 DEGREES
EKG P-R INTERVAL: 154 MS
EKG Q-T INTERVAL: 374 MS
EKG QRS DURATION: 92 MS
EKG QTC CALCULATION (BAZETT): 457 MS
EKG R AXIS: 1 DEGREES
EKG T AXIS: -6 DEGREES
EKG VENTRICULAR RATE: 90 BPM
EOSINOPHILS ABSOLUTE: 0.1 K/CU MM
EOSINOPHILS RELATIVE PERCENT: 1.6 % (ref 0–3)
GFR AFRICAN AMERICAN: >60 ML/MIN/1.73M2
GFR NON-AFRICAN AMERICAN: >60 ML/MIN/1.73M2
GLUCOSE BLD-MCNC: 95 MG/DL (ref 70–99)
HCT VFR BLD CALC: 40.6 % (ref 37–47)
HEMOGLOBIN: 13.1 GM/DL (ref 12.5–16)
IMMATURE NEUTROPHIL %: 0.4 % (ref 0–0.43)
LYMPHOCYTES ABSOLUTE: 2.7 K/CU MM
LYMPHOCYTES RELATIVE PERCENT: 30.4 % (ref 24–44)
MCH RBC QN AUTO: 27.3 PG (ref 27–31)
MCHC RBC AUTO-ENTMCNC: 32.3 % (ref 32–36)
MCV RBC AUTO: 84.8 FL (ref 78–100)
MONOCYTES ABSOLUTE: 0.6 K/CU MM
MONOCYTES RELATIVE PERCENT: 7 % (ref 0–4)
NUCLEATED RBC %: 0 %
PDW BLD-RTO: 12.7 % (ref 11.7–14.9)
PLATELET # BLD: 342 K/CU MM (ref 140–440)
PMV BLD AUTO: 11.7 FL (ref 7.5–11.1)
POTASSIUM SERPL-SCNC: 4.3 MMOL/L (ref 3.5–5.1)
RBC # BLD: 4.79 M/CU MM (ref 4.2–5.4)
SEGMENTED NEUTROPHILS ABSOLUTE COUNT: 5.4 K/CU MM
SEGMENTED NEUTROPHILS RELATIVE PERCENT: 60.2 % (ref 36–66)
SODIUM BLD-SCNC: 138 MMOL/L (ref 135–145)
TOTAL IMMATURE NEUTOROPHIL: 0.04 K/CU MM
TOTAL NUCLEATED RBC: 0 K/CU MM
TOTAL PROTEIN: 6.8 GM/DL (ref 6.4–8.2)
TROPONIN T: <0.01 NG/ML
WBC # BLD: 8.9 K/CU MM (ref 4–10.5)

## 2020-09-11 PROCEDURE — 85379 FIBRIN DEGRADATION QUANT: CPT

## 2020-09-11 PROCEDURE — 80053 COMPREHEN METABOLIC PANEL: CPT

## 2020-09-11 PROCEDURE — 84484 ASSAY OF TROPONIN QUANT: CPT

## 2020-09-11 PROCEDURE — 85025 COMPLETE CBC W/AUTO DIFF WBC: CPT

## 2020-09-11 PROCEDURE — 93005 ELECTROCARDIOGRAM TRACING: CPT | Performed by: EMERGENCY MEDICINE

## 2020-09-11 PROCEDURE — 71045 X-RAY EXAM CHEST 1 VIEW: CPT

## 2020-09-11 PROCEDURE — 93005 ELECTROCARDIOGRAM TRACING: CPT | Performed by: PHYSICIAN ASSISTANT

## 2020-09-11 PROCEDURE — 93010 ELECTROCARDIOGRAM REPORT: CPT | Performed by: INTERNAL MEDICINE

## 2020-09-11 PROCEDURE — 99285 EMERGENCY DEPT VISIT HI MDM: CPT

## 2020-09-11 ASSESSMENT — PAIN DESCRIPTION - ORIENTATION: ORIENTATION: LEFT

## 2020-09-11 ASSESSMENT — PAIN SCALES - GENERAL
PAINLEVEL_OUTOF10: 5
PAINLEVEL_OUTOF10: 5

## 2020-09-11 ASSESSMENT — PAIN DESCRIPTION - LOCATION: LOCATION: CHEST

## 2020-09-11 ASSESSMENT — PAIN DESCRIPTION - PAIN TYPE: TYPE: ACUTE PAIN

## 2020-09-11 NOTE — ED NOTES
Reviewed discharge information. Patient verbalized understanding of paperwork, and care instructions. Patient denied any questions.      Sarah Theodore RN  09/11/20 1426

## 2020-09-11 NOTE — ED PROVIDER NOTES
EMERGENCY DEPARTMENT ENCOUNTER        PCP: MIREYA Mustafa - CNP    CHIEF COMPLAINT    Chief Complaint   Patient presents with    Chest Pain     sudden onset last night, left sternal into left arm/shouler. 5/10 tightness constant             This patient was not evaluated by the attending physician. I have independently evaluated this patient. HPI      Luís Ceballos is a 30 y.o. female who presents with chest pain. Onset - insidious, approximately 14 hours prior to arrival to ED  Location -left chest  Duration -since onset  Character -sharp, pain does not migrate (TAD)  Aggravating/Alleviating factors -worse with deep inspiration, no known alleviating factors  Activity at onset -patient was emotionally upset at onset and attributed pain initially to emotionally upset. Pain persisted this morning at which time she was not emotionally upset  Associate symptoms -none, Pt denies any concurrent neurological symptoms (TAD)  Radiation -left shoulder, left upper back  Severity -moderate      REVIEW OF SYSTEMS    Constitutional:  Denies fever, chills. HENT:  Denies sore throat or ear pain   Cardiovascular:  +Chest Pain,  Denies palpitations,  Denies syncope, no extremity edema. Respiratory:    Denies cough, shortness of breath, or hemoptysis    GI:  Denies abdominal pain, nausea, vomiting, or diarrhea  :  Denies any urinary symptoms. Denies pregnancy-history of hysterectomy  Musculoskeletal:   no extremity pain, swelling or discoloration.   No pale or cold extremity  Skin:  Denies rash  Neurologic:  Denies changes in vision,  lightheadedness, dizziness, headache, focal weakness or sensory changes   Endocrine:  Denies polyuria or polydypsia   Lymphatic:  Denies swollen glands     All other review of systems are negative  See HPI and nursing notes for additional information         PAST MEDICAL & SURGICAL HISTORY    Past Medical History:   Diagnosis Date    37 weeks gestation of pregnancy 6/28/2016    Anemia     History of postpartum hemorrhage, currently pregnant in third trimester 6/28/2016    Migraines     Preeclampsia 2014    Sciatica      Past Surgical History:   Procedure Laterality Date    CHOLECYSTECTOMY  12/13/2018    DENTAL SURGERY      wisdom teeth    HYSTERECTOMY      SALPINGECTOMY         CURRENT MEDICATIONS    Current Outpatient Rx   Medication Sig Dispense Refill    naproxen (NAPROSYN) 500 MG tablet Take 1 tablet by mouth 2 times daily as needed for Pain 30 tablet 0    methocarbamol (ROBAXIN) 500 MG tablet Take 1 tablet by mouth 4 times daily As needed for muscle spasm.  20 tablet 0    sertraline (ZOLOFT) 50 MG tablet Take 1 tablet by mouth daily 30 tablet 5    busPIRone (BUSPAR) 10 MG tablet TAKE ONE-HALF TABLET BY MOUTH THREE TIMES A DAY AS NEEDED FOR ANXIETY 30 tablet 5    EPINEPHrine (EPIPEN 2-CLEOPATRA) 0.3 MG/0.3ML SOAJ injection Use as directed for allergic reaction 2 each 0       ALLERGIES    Allergies   Allergen Reactions    Latex Hives and Rash    Macrobid [Nitrofurantoin Macrocrystal] Hives     No SOB or closing of throat noted when pt had reaction      Norco [Hydrocodone-Acetaminophen]      Makes her really sick to her stomach; hard to wake up with,it keeps her asleep    Nuvaring [Etonogestrel-Ethinyl Estradiol] Other (See Comments)     Severe cramping and bleeding    Other      Prolene suture    Ropivacaine     Tape [facility]le Gregg Tape] Rash       SOCIAL & FAMILY HISTORY    Social History     Socioeconomic History    Marital status:      Spouse name: None    Number of children: None    Years of education: None    Highest education level: None   Occupational History    None   Social Needs    Financial resource strain: None    Food insecurity     Worry: None     Inability: None    Transportation needs     Medical: None     Non-medical: None   Tobacco Use    Smoking status: Never Smoker    Smokeless tobacco: Never Used   Substance and Sexual Activity    Alcohol use: No    Drug use: No    Sexual activity: Yes     Partners: Male   Lifestyle    Physical activity     Days per week: None     Minutes per session: None    Stress: None   Relationships    Social connections     Talks on phone: None     Gets together: None     Attends Tenriism service: None     Active member of club or organization: None     Attends meetings of clubs or organizations: None     Relationship status: None    Intimate partner violence     Fear of current or ex partner: None     Emotionally abused: None     Physically abused: None     Forced sexual activity: None   Other Topics Concern    None   Social History Narrative    None     Family History   Problem Relation Age of Onset    Heart Disease Brother     High Blood Pressure Brother     Learning Disabilities Brother        PHYSICAL EXAM    VITAL SIGNS: /80   Pulse 85   Temp 98.1 °F (36.7 °C) (Oral)   Resp 15   LMP 10/18/2018 (Approximate)   SpO2 100%    Constitutional:  Well developed, well nourished, no acute distress   HENT:  Atraumatic, moist mucus membranes  Neck/Lymphatics: supple, no JVD, no swollen nodes in neck or axillary region  Respiratory:  Nonlabored breathing. Lungs Clear, no retractions. Cardiovascular:  RRR, no murmurs/rubs/gallops. No carotid bruits or murmurs heard in carotids. No JVD  + Tenderness of the left chest wall-patient states this is the same nature of her symptoms for which she is being seen today. No palpable defect. No crepitus. Vascular:   Radial and femoral pulses palpable and reveal no discernible inequality    GI:  Soft, nontender, normal bowel sounds  Musculoskeletal:    There is no edema, asymmetry, or calf / thigh tenderness bilaterally. No cyanosis. No cool or pale-appearing limb.   Distal cap refill and pulses intact bilateral upper and lower extremities  Bilateral upper and lower extremity ROM intact without pain or obvious deficit    Integument:  Skin warm and dry, no Gap 13 4 - 16   Troponin   Result Value Ref Range    Troponin T <0.010 <0.01 NG/ML   D-dimer, Quantitative   Result Value Ref Range    D-Dimer, Quant <200 <230 NG/mL(DDU)   EKG 12 Lead   Result Value Ref Range    Ventricular Rate 90 BPM    Atrial Rate 90 BPM    P-R Interval 154 ms    QRS Duration 92 ms    Q-T Interval 374 ms    QTc Calculation (Bazett) 457 ms    P Axis 39 degrees    R Axis 1 degrees    T Axis -6 degrees    Diagnosis       Normal sinus rhythm  Incomplete right bundle branch block  T wave abnormality, consider anterior ischemia  Abnormal ECG  No previous ECGs available  Confirmed by Kylie Call, SAYED ([id number]) on 9/11/2020 1:13:22 PM           EKG Interpretation  Please see ED physician's note for EKG interpretation        RADIOLOGY/PROCEDURES      XR CHEST PORTABLE   Final Result   No acute process. ED COURSE & MEDICAL DECISION MAKING      Exact etiology of patient's symptoms unknown at this time. I considered but do not suspect Aortic discection / aneurysm. Pt does not report sudden onset of tearing pain, pain does not migrate, pt denies concurrent neuro symptoms (& pt neurologically intact on exam today), and I do not appreciate inequality of pulses on exam today. Additionally, CXR is without abnormality suggestive of TAD and  D-dimer is not elevated    I also considered pulmonary embolism as the cause of symptoms today. Using risk stratification tools , PERC and Wells and obtain d-dimer today, is negative and I do not suspect PE at this time. Patient's stratification is a heart score of 1. Patient's EKG has nonspecific T wave changes in the precordial leads. There is evidence of right bundle branch block. Plan to discuss with cardiologist.    [phone] [id number] I discussed patient case with cardiologist, Dr. Karel aNidu.  He reviewed EKG and my work-up today and feels patient may be safely discharged home to his service on outpatient basis.   He recommends patient call his office and will see in the near future. This information relayed to patient today who understands and agrees with plan. Serial rechecks, exam before discharge reveals patient continues to have reliably reproducible pain on deep inspiration as well as direct palpation to lateral chest wall region. This is less concerning for underlying cardiopulmonary cause of her symptoms. We discussed the possibility of chest wall pain and recommend rest, NSAIDs, and avoid vigorous activity, emotionally stressful activities. Strict return precautions discussed in detail prior to discharge. Clinical  IMPRESSION    1. Chest pain, unspecified type            Comment: Please note this report has been produced using speech recognition software and may contain errors related to that system including errors in grammar, punctuation, and spelling, as well as words and phrases that may be inappropriate. If there are any questions or concerns please feel free to contact the dictating provider for clarification.        Romayne Southward, [place]  09/11/20 5007

## 2020-09-11 NOTE — ED TRIAGE NOTES
Patient to ER for c/o chest pain. States she was sitting in her car last night when she developed sudden chest pain. Describes as mid to left sternal, 5/10 tightness that has been constant and radiates into left arm/shoulder.

## 2020-09-11 NOTE — TELEPHONE ENCOUNTER
Pt called in stating she has been having chest pains since last night and she thought it was because she was upset however she states it is still happening this morning and the chest pain radiates into left arm and into the back. Spoke with Marielle Sams CNP and advised Pt she needs to go to ER. Pt verbalized understanding.

## 2020-09-11 NOTE — ED NOTES
EKG completed.  Railroad drawn on IV start and sent to lab     [facility], [facility]  09/11/20 5343

## 2020-09-11 NOTE — ED PROVIDER NOTES
This EKG was interpreted by me. Rate is 81, rhythm is sinus. ME and QT intervals are within normal limits. There is no ST segment or T wave changes. T waves inverted in lead III, aVF. T waves inverted in the precordial leads. This EKG was compared to previous EKG from date 11/19/13. Progression of T wave flattening from previous.      Boris Anderson DO  09/11/20 1101

## 2020-09-13 LAB
EKG ATRIAL RATE: 81 BPM
EKG DIAGNOSIS: NORMAL
EKG P AXIS: 36 DEGREES
EKG P-R INTERVAL: 164 MS
EKG Q-T INTERVAL: 372 MS
EKG QRS DURATION: 90 MS
EKG QTC CALCULATION (BAZETT): 432 MS
EKG R AXIS: -2 DEGREES
EKG T AXIS: -13 DEGREES
EKG VENTRICULAR RATE: 81 BPM

## 2020-09-13 PROCEDURE — 93010 ELECTROCARDIOGRAM REPORT: CPT | Performed by: INTERNAL MEDICINE

## 2021-03-19 ENCOUNTER — OFFICE VISIT (OUTPATIENT)
Dept: BARIATRICS/WEIGHT MGMT | Age: 26
End: 2021-03-19
Payer: MEDICAID

## 2021-03-19 VITALS
WEIGHT: 251.5 LBS | BODY MASS INDEX: 44.56 KG/M2 | HEIGHT: 63 IN | HEART RATE: 116 BPM | SYSTOLIC BLOOD PRESSURE: 118 MMHG | RESPIRATION RATE: 14 BRPM | DIASTOLIC BLOOD PRESSURE: 74 MMHG | TEMPERATURE: 98.7 F | OXYGEN SATURATION: 99 %

## 2021-03-19 DIAGNOSIS — F32.0 CURRENT MILD EPISODE OF MAJOR DEPRESSIVE DISORDER WITHOUT PRIOR EPISODE (HCC): ICD-10-CM

## 2021-03-19 DIAGNOSIS — F41.9 ANXIETY: ICD-10-CM

## 2021-03-19 DIAGNOSIS — R19.7 DIARRHEA, UNSPECIFIED TYPE: ICD-10-CM

## 2021-03-19 DIAGNOSIS — E66.01 MORBID OBESITY WITH BMI OF 40.0-44.9, ADULT (HCC): Primary | ICD-10-CM

## 2021-03-19 DIAGNOSIS — M62.830 MUSCLE SPASM OF BACK: ICD-10-CM

## 2021-03-19 DIAGNOSIS — G43.009 MIGRAINE WITHOUT AURA AND WITHOUT STATUS MIGRAINOSUS, NOT INTRACTABLE: ICD-10-CM

## 2021-03-19 DIAGNOSIS — K21.00 GASTROESOPHAGEAL REFLUX DISEASE WITH ESOPHAGITIS WITHOUT HEMORRHAGE: ICD-10-CM

## 2021-03-19 PROBLEM — K21.9 ESOPHAGEAL REFLUX: Status: ACTIVE | Noted: 2021-03-19

## 2021-03-19 PROCEDURE — G8427 DOCREV CUR MEDS BY ELIG CLIN: HCPCS | Performed by: SURGERY

## 2021-03-19 PROCEDURE — G8417 CALC BMI ABV UP PARAM F/U: HCPCS | Performed by: SURGERY

## 2021-03-19 PROCEDURE — 1036F TOBACCO NON-USER: CPT | Performed by: SURGERY

## 2021-03-19 PROCEDURE — G8484 FLU IMMUNIZE NO ADMIN: HCPCS | Performed by: SURGERY

## 2021-03-19 PROCEDURE — 99203 OFFICE O/P NEW LOW 30 MIN: CPT | Performed by: SURGERY

## 2021-03-19 RX ORDER — CHOLESTYRAMINE 4 G/9G
1 POWDER, FOR SUSPENSION ORAL 3 TIMES DAILY
Qty: 90 PACKET | Refills: 3 | Status: SHIPPED | OUTPATIENT
Start: 2021-03-19 | End: 2021-09-30 | Stop reason: ALTCHOICE

## 2021-03-19 RX ORDER — BUPROPION HYDROCHLORIDE 75 MG/1
100 TABLET ORAL DAILY
COMMUNITY
End: 2021-06-03

## 2021-03-19 RX ORDER — TOPIRAMATE 25 MG/1
25 TABLET ORAL 2 TIMES DAILY
COMMUNITY
End: 2021-06-03

## 2021-03-19 ASSESSMENT — ENCOUNTER SYMPTOMS
DIARRHEA: 0
BLOOD IN STOOL: 0
ABDOMINAL DISTENTION: 1
COUGH: 0
SORE THROAT: 0
NAUSEA: 0
TROUBLE SWALLOWING: 0
VOICE CHANGE: 0
BACK PAIN: 1
CONSTIPATION: 0
VOMITING: 0
ABDOMINAL PAIN: 1
WHEEZING: 0
ANAL BLEEDING: 0
COLOR CHANGE: 0
PHOTOPHOBIA: 0

## 2021-03-19 NOTE — PROGRESS NOTES
Bariatric Surgery Consultation    Erasto Chase, 1995, 30 y.o.,  female, CSN:   03/19/21     Chief Complaint:    Chief Complaint   Patient presents with    Weight Management     new surg WM visit, has surg brian       SUBJECTIVE:  Kirti Feliz is a 30 y.o. female being seen for morbid obesity, considering weight loss surgery; Estefani's, Height: 5' 3.25\" (160.7 cm), Weight: 251 lb 8 oz (114.1 kg), Current Body mass index is 44.2 kg/m². The patient's PCP is the referring physician MIREYA Shaffer - [place]    HPI:  Kirti Feliz has suffered from overweight / severe obesity for (20) years, and was of gradual onset but progressive course - tried MANY different diets and on and off over the weeks, months and years depression and anxiety, and Bipolar since her son was born 6 yrs ago with severe ADHD, .  2 yrs ago. [place] and work status is works my [employer] business, [employer], and has 2 children 6 boy and daughter 3 yo, both from the same dad. . her fiance takes better care of her kids than the real dad. .    The patient first recognized that she had a weight problem about 2 years ago. The patient's lowest weight in the last before she had her son, [address]. . was 180 lbs, , and the highest weight in the last five years was 251 lbs. Weight Loss Program History:  The patient states she has tried Keto, gluten free, starvation. The most weight lost ever with diet and exercise was 25 Lbs, but unfortunately only kept them of for 6 months. These attempts have been temporarily successful with weight loss, but have failed to sustain adequate weight loss. Mortality from the morbid obesity is very high:       Estefani's life is significantly affected by weight related to her co-morbidities. The patient has also tried but failed self directed diet and exercise.     Comorbid Conditions:  Significant diseases affecting this patient are   Past Medical History:   Diagnosis Date    37 weeks gestation of pregnancy 6/28/2016    Anemia     History of postpartum hemorrhage, currently pregnant in third trimester 6/28/2016    Migraines     Preeclampsia 2014    Sciatica      And     Review of Systems - Review of Systems   Constitutional: Positive for fatigue. Negative for activity change, chills, diaphoresis and fever. HENT: Negative for sore throat, trouble swallowing and voice change. Eyes: Negative for photophobia and visual disturbance. Respiratory: Negative for cough and wheezing. Cardiovascular: Positive for leg swelling. Negative for chest pain and palpitations. Gastrointestinal: Positive for abdominal distention and abdominal pain (Tender after I eat, indigestion and heart burn. . once daily. Ruben Fallow despite tums, and alkacelcer shew. [place] Fallow ). Negative for anal bleeding, blood in stool, constipation, diarrhea, nausea and vomiting. Endocrine: Positive for polyphagia. Negative for cold intolerance, heat intolerance, polydipsia and polyuria. Genitourinary: Positive for urgency. Negative for dysuria, frequency and hematuria. Musculoskeletal: Positive for arthralgias (Left knee is the worst.. ), back pain and gait problem. Negative for joint swelling, myalgias and neck stiffness. Skin: Negative for color change and rash. Neurological: Negative for seizures, speech difficulty, light-headedness and numbness. Hematological: Negative for adenopathy. Does not bruise/bleed easily. Psychiatric/Behavioral: Positive for sleep disturbance (Bad insomnia. Ruben Fallow ). The patient is nervous/anxious. All others reviewed and are negative. Allergies:   Allergies   Allergen Reactions    Latex Hives and Rash    Macrobid [Nitrofurantoin Macrocrystal] Hives     No SOB or closing of throat noted when pt had reaction      Norco [Hydrocodone-Acetaminophen]      Makes her really sick to her stomach; hard to wake up with,it keeps her asleep    Nuvaring [Etonogestrel-Ethinyl Estradiol] Other (See Comments)     Severe cramping and bleeding    Other      Prolene suture    Ropivacaine     Tape Ro Panning Tape] Rash     VERY SENSITIVE to narcotics, will hopefully avoid. Medications:  Current Outpatient Medications   Medication Sig Dispense Refill    topiramate (TOPAMAX) 25 MG tablet Take 25 mg by mouth 2 times daily      buPROPion (WELLBUTRIN) 75 MG tablet Take 75 mg by mouth 2 times daily      cholestyramine (QUESTRAN) 4 g packet Take 1 packet by mouth 3 times daily 90 packet 3    sertraline (ZOLOFT) 50 MG tablet Take 1 tablet by mouth daily 30 tablet 5    busPIRone (BUSPAR) 10 MG tablet TAKE ONE-HALF TABLET BY MOUTH THREE TIMES A DAY AS NEEDED FOR ANXIETY (Patient taking differently: Take 25 mg by mouth 3 times daily TAKE ONE-HALF TABLET BY MOUTH THREE TIMES A DAY AS NEEDED FOR ANXIETY) 30 tablet 5    EPINEPHrine (EPIPEN 2-CLEOPATRA) 0.3 MG/0.3ML SOAJ injection Use as directed for allergic reaction 2 each 0    naproxen (NAPROSYN) 500 MG tablet Take 1 tablet by mouth 2 times daily as needed for Pain (Patient not taking: Reported on 3/19/2021) 30 tablet 0    methocarbamol (ROBAXIN) 500 MG tablet Take 1 tablet by mouth 4 times daily As needed for muscle spasm. (Patient not taking: Reported on 3/19/2021) 20 tablet 0     No current facility-administered medications for this visit.         Past Surgical History:  Past Surgical History:   Procedure Laterality Date    CHOLECYSTECTOMY  12/13/2018    DENTAL SURGERY      wisdom teeth    HYSTERECTOMY      SALPINGECTOMY         Family History:  Family History   Problem Relation Age of Onset    Heart Disease Brother     High Blood Pressure Brother     Learning Disabilities Brother        Social History:  Social History     Socioeconomic History    Marital status:      Spouse name: Not on file    Number of children: Not on file    Years of education: Not on file    Highest education level: Not on file   Occupational History    Not on file   Social Needs    Financial resource No axillary adenopathy. Skin: Skin is warm, dry and intact. No rash. Psychiatric: The patient is alert and oriented. The patient has a normal mood and affect. Speech is normal and behavior is normal. Judgment and thought content normal. Cognition and memory are normal.     ASSESSMENT & PLAN:    Patient Active Problem List   Diagnosis    Diarrhea    Non morbid obesity due to excess calories    Migraine without aura and without status migrainosus, not intractable    Depression    Muscle spasm of back    Anxiety    Latex allergy    S/P laparoscopic cholecystectomy    Morbid obesity with BMI of 40.0-44.9, adult (HCC)    Esophageal reflux       Needs EGD to decide WHAT surgery to do, severe GERD? The patient is good candidate for surgery. The patient is interested in the RoboticSleeve Gastrectomy or the RYGB. Will continue work up toward surgery. Counseled extensively regardin) DIET and MONITOR the Weight:  - 1500 Kcal Diet/day. - Write down everything you eat and drink for 1 wk  - No calories from drinks  - Slim fast diet: 4 cans per day 1-2 days a week with only NO caloriesdrinks those days    2) EXERCISE: 1 hr/day 5 days a week    3) DIETITIAN / NUTRITIONAL CONSULT, evaluation and counseling to carnes healthy diet ~1500 Kcal /day    4) EXERCISE PHYSIOLOGIST CONSULT    5)PSYCHOLOGICAL EVALUATION    6) MONTHLY FOLLOW UP,  to follow and document diet and exercise trial    7) Planning a Sleeve Gastrectomy in few months    8) 2 Pictures were taken today to concretely monitorweight loss over time. 9) CARDIOLOGY OPTIMIZATION AND CLEARANCE BEFORE SURGERY    10) PULMONOLOGY OPTIMIZATION AND CLEARANCE BEFORE SURGERY    WILL CHECK BASELINE BARIATRIC COMPREHENSIVE LABS.     Orders Placed This Encounter   Medications    cholestyramine (QUESTRAN) 4 g packet     Sig: Take 1 packet by mouth 3 times daily     Dispense:  90 packet     Refill:  3     Orders Placed This Encounter   Procedures    Basic Metabolic Panel    CBC Auto Differential    Hemoglobin A1C    Hepatic Function Panel    Lipid Panel    TSH with Reflex    Amb Referral to Nutrition Services    Ambulatory referral to Physical Therapy        Pt was handed a food diary notebook to help monitor Florencio intake, and patientinformation pamphlet on Sleeve Gastrectomy or RYGB, has major GERD, and chocolate cravings, EVERY DAY, at night. . was an RN. I counseled the patient regarding weight loss, appropriate diet and exercise, and healthy eating habits.    - Eat slow, and chew 50-60 times before swallowing  - Eat smaller portions in smaller plates  - Weigh yourself at least 2-3 times a week    The patient will be scheduled for a follow up visit in Return in about 2 weeks (around 4/2/2021), or EGD, for Bariatric follow up: diet, exercise & weight loss, For imaging and tests results review. .    Bariatric 1200 calorie diet, RAMIREZ, heart healthy, 60-80 gram protein.    ____________________________________________    Enmanuel Garduno MD, FACS, FICS  Member of the American Society of Metabolic and Bariatric Sur[employer]    3/19/2021  3:46 PM

## 2021-03-24 ENCOUNTER — HOSPITAL ENCOUNTER (OUTPATIENT)
Age: 26
Discharge: HOME | End: 2021-03-24
Payer: MEDICAID

## 2021-03-24 LAB
ALBUMIN SERPL-MCNC: 4.5 GM/DL (ref 3.4–5)
ALP BLD-CCNC: 68 IU/L (ref 40–129)
ALT SERPL-CCNC: 21 U/L (ref 10–40)
ANION GAP SERPL CALCULATED.3IONS-SCNC: 12 MMOL/L (ref 4–16)
AST SERPL-CCNC: 19 IU/L (ref 15–37)
BASOPHILS ABSOLUTE: 0 K/CU MM
BASOPHILS RELATIVE PERCENT: 0.3 % (ref 0–1)
BILIRUB SERPL-MCNC: 0.3 MG/DL (ref 0–1)
BILIRUBIN DIRECT: 0.2 MG/DL (ref 0–0.3)
BILIRUBIN, INDIRECT: 0.1 MG/DL (ref 0–0.7)
BUN BLDV-MCNC: 17 MG/DL (ref 6–23)
CALCIUM SERPL-MCNC: 9.5 MG/DL (ref 8.3–10.6)
CHLORIDE BLD-SCNC: 104 MMOL/L (ref 99–110)
CHOLESTEROL: 150 MG/DL
CO2: 22 MMOL/L (ref 21–32)
CREAT SERPL-MCNC: 0.9 MG/DL (ref 0.6–1.1)
DIFFERENTIAL TYPE: ABNORMAL
EOSINOPHILS ABSOLUTE: 0.1 K/CU MM
EOSINOPHILS RELATIVE PERCENT: 1.6 % (ref 0–3)
ESTIMATED AVERAGE GLUCOSE: 108 MG/DL
GFR AFRICAN AMERICAN: >60 ML/MIN/1.73M2
GFR NON-AFRICAN AMERICAN: >60 ML/MIN/1.73M2
GLUCOSE BLD-MCNC: 90 MG/DL (ref 70–99)
HBA1C MFR BLD: 5.4 % (ref 4.2–6.3)
HCT VFR BLD CALC: 40.4 % (ref 37–47)
HDLC SERPL-MCNC: 39 MG/DL
HEMOGLOBIN: 13.3 GM/DL (ref 12.5–16)
IMMATURE NEUTROPHIL %: 0.5 % (ref 0–0.43)
LDL CHOLESTEROL DIRECT: 101 MG/DL
LYMPHOCYTES ABSOLUTE: 2.4 K/CU MM
LYMPHOCYTES RELATIVE PERCENT: 27.4 % (ref 24–44)
MCH RBC QN AUTO: 27.8 PG (ref 27–31)
MCHC RBC AUTO-ENTMCNC: 32.9 % (ref 32–36)
MCV RBC AUTO: 84.3 FL (ref 78–100)
MONOCYTES ABSOLUTE: 0.6 K/CU MM
MONOCYTES RELATIVE PERCENT: 6.3 % (ref 0–4)
NUCLEATED RBC %: 0 %
PDW BLD-RTO: 13.2 % (ref 11.7–14.9)
PLATELET # BLD: 393 K/CU MM (ref 140–440)
PMV BLD AUTO: 11.2 FL (ref 7.5–11.1)
POTASSIUM SERPL-SCNC: 4.5 MMOL/L (ref 3.5–5.1)
RBC # BLD: 4.79 M/CU MM (ref 4.2–5.4)
SEGMENTED NEUTROPHILS ABSOLUTE COUNT: 5.6 K/CU MM
SEGMENTED NEUTROPHILS RELATIVE PERCENT: 63.9 % (ref 36–66)
SODIUM BLD-SCNC: 138 MMOL/L (ref 135–145)
TOTAL IMMATURE NEUTOROPHIL: 0.04 K/CU MM
TOTAL NUCLEATED RBC: 0 K/CU MM
TOTAL PROTEIN: 7 GM/DL (ref 6.4–8.2)
TRIGL SERPL-MCNC: 91 MG/DL
TSH HIGH SENSITIVITY: 1.01 UIU/ML (ref 0.27–4.2)
WBC # BLD: 8.8 K/CU MM (ref 4–10.5)

## 2021-03-24 PROCEDURE — 82248 BILIRUBIN DIRECT: CPT

## 2021-03-24 PROCEDURE — 36415 COLL VENOUS BLD VENIPUNCTURE: CPT

## 2021-03-24 PROCEDURE — 83036 HEMOGLOBIN GLYCOSYLATED A1C: CPT

## 2021-03-24 PROCEDURE — 80053 COMPREHEN METABOLIC PANEL: CPT

## 2021-03-24 PROCEDURE — 83721 ASSAY OF BLOOD LIPOPROTEIN: CPT

## 2021-03-24 PROCEDURE — 85025 COMPLETE CBC W/AUTO DIFF WBC: CPT

## 2021-03-24 PROCEDURE — 84443 ASSAY THYROID STIM HORMONE: CPT

## 2021-03-24 PROCEDURE — 80061 LIPID PANEL: CPT

## 2021-04-05 ENCOUNTER — HOSPITAL ENCOUNTER (OUTPATIENT)
Age: 26
Setting detail: SPECIMEN
Discharge: HOME | End: 2021-04-05
Payer: MEDICAID

## 2021-04-05 ENCOUNTER — NURSE ONLY (OUTPATIENT)
Dept: SURGERY | Age: 26
End: 2021-04-05
Payer: MEDICAID

## 2021-04-05 VITALS
TEMPERATURE: 97.2 F | HEART RATE: 86 BPM | OXYGEN SATURATION: 98 % | SYSTOLIC BLOOD PRESSURE: 118 MMHG | DIASTOLIC BLOOD PRESSURE: 70 MMHG

## 2021-04-05 DIAGNOSIS — Z01.818 PRE-OP TESTING: Primary | ICD-10-CM

## 2021-04-05 PROCEDURE — 99211 OFF/OP EST MAY X REQ PHY/QHP: CPT | Performed by: SURGERY

## 2021-04-05 PROCEDURE — U0005 INFEC AGEN DETEC AMPLI PROBE: HCPCS

## 2021-04-05 PROCEDURE — U0003 INFECTIOUS AGENT DETECTION BY NUCLEIC ACID (DNA OR RNA); SEVERE ACUTE RESPIRATORY SYNDROME CORONAVIRUS 2 (SARS-COV-2) (CORONAVIRUS DISEASE [COVID-19]), AMPLIFIED PROBE TECHNIQUE, MAKING USE OF HIGH THROUGHPUT TECHNOLOGIES AS DESCRIBED BY CMS-2020-01-R: HCPCS

## 2021-04-05 RX ORDER — M-VIT,TX,IRON,MINS/CALC/FOLIC 27MG-0.4MG
1 TABLET ORAL DAILY
COMMUNITY
End: 2022-02-08 | Stop reason: ALTCHOICE

## 2021-04-05 NOTE — PROGRESS NOTES
Patient collected pre-op COVID-19 screening instruction and collection supplies given to patient accordingly. Patient denies fever/cough/sob or recent travels. Patient voiced understanding of collection/quarantine instructions. COVID screening lab ordered, collection completed without difficulty, identifiers placed on specimen. AVS given at discharge. Results will be given via mychart or telephone call [facility] will manage any positive test results, the procedure will be rescheduled at a later date).

## 2021-04-05 NOTE — PROGRESS NOTES
SURGERY OF 4/12/21 ARRIVE AT ----     1. Do not eat or drink anything after midnight - unless instructed by your doctor prior to surgery. This included no water, chewing gum, or mints. 2. Follow your directions as prescribed by the doctor for your procedure and medications. 3. Check with your doctor regarding stopping Plavix, Coumadin, Lovenox, Effient, Pradaxa, Xarelto, Fragmin or other blood thinner and follow their instructions. 4. Do not smoke and do not drink alcoholic beverages 24 hours prior to surgery. This includes non-alcoholic beer. 5. You may brush your teeth and gargle the morning of your procedure. Do not swallow the water. 6. You must make arrangements for a responsible adult to drive you home and it is strongly suggested to have someone stay with you for 24 hours after your procedure to ensure your safety. Your surgery will be cancelled if you do not have a ride home. 7. Please wear simple, loose fitting clothing to the hospital. Do not bring any valuables (money, credit cards, checkbooks, etc.). 8. You can take a shower the night before or morning of your procedure. Do not wear any makeup including body lotions, oils, or powders. Please do not wear nail polish on finger nails and toenails. 8. Do not wear any jewelry or piercings on the day of your procedure. 9. If you have dentures or partial plates, a cup will be provided, if you wear contacts or glasses please bring a case. 10. If you have living will and durable power of  for healthcare, please bring a copy. 11. Please bring your picture ID, insurance card, and paperwork from the doctors office. 12. Wear a mask into to outpatient center the covers your nose and mouth. Have your covid 19 test completed 10 days prior to your procedure. Please try to quarantine after the covid test until the day of your procedure.

## 2021-04-05 NOTE — PATIENT INSTRUCTIONS
Pre-Procedure COVID-19 Self Testing  Quarantine Instructions  Day of Surgery Instructions         What to do before my surgery:    All patients scheduled for elective surgery must test for COVID19 72-96 hours prior to the surgery date.  Pre-Procedure COVID-19 Self-Test will be scheduled for you by your provider.  You can receive your Pre-Procedure COVID-19 Self-Test at:  [facility] Rob[facility]. [address] [facility], [facility], [address] [facility]   If you do not have the COVID-19 test we will cancel or reschedule your procedure   Once you test you must quarantine at home until after your procedure with only your immediate family members or whoever lives with you.  If you must work during your quarantine period, we ask that you continue to practice social distancing, wear a mask that covers your mouth and nose and perform all hand hygiene as recommended by the CDC.  If you must go to the grocery, etc. and cannot get someone to do this for you please wear a mask that covers your mouth and nose and perform all hand hygiene as recommended by the CDC.  Your surgeon's office will notify you with any concerns about your test result. What can I expect on the day of surgery?  Arrive at the time the office or hospital staff tell you on the day of your procedure.  Wear a mask when entering the hospital.     A member of the hospital staff will take your temperature and ask you a few questions as you enter the building.  In abundance of caution for the safety of all our patients and staff, please follow all hospital visitor guidelines in place at the time of your procedure. The staff caring for you will stay in close communication with your loved one and keep them updated on progress.  Please provide a phone number for us to use when communicating with your family or ride home.    When you are ready to discharge, we will notify your family/person with you to bring the car to the front entrance. We will take you to them after you receive all of your discharge instructions.

## 2021-04-07 ENCOUNTER — HOSPITAL ENCOUNTER (OUTPATIENT)
Dept: PHYSICAL THERAPY | Age: 26
Setting detail: THERAPIES SERIES
Discharge: HOME | End: 2021-04-07
Payer: MEDICAID

## 2021-04-07 LAB
SARS-COV-2: NOT DETECTED
SOURCE: NORMAL

## 2021-04-07 PROCEDURE — 97161 PT EVAL LOW COMPLEX 20 MIN: CPT

## 2021-04-07 PROCEDURE — 97110 THERAPEUTIC EXERCISES: CPT

## 2021-04-07 ASSESSMENT — PAIN DESCRIPTION - LOCATION: LOCATION: ANKLE;KNEE;BACK

## 2021-04-07 ASSESSMENT — PAIN DESCRIPTION - PROGRESSION: CLINICAL_PROGRESSION: NOT CHANGED

## 2021-04-07 ASSESSMENT — PAIN DESCRIPTION - PAIN TYPE: TYPE: CHRONIC PAIN;ACUTE PAIN

## 2021-04-07 ASSESSMENT — PAIN DESCRIPTION - ORIENTATION: ORIENTATION: LEFT

## 2021-04-07 NOTE — FLOWSHEET NOTE
Outpatient Physical Therapy  [place]           [x] Phone: [phone]   Fax: [phone]  [place]           [] Phone: [phone]   Fax: [phone]        Physical Therapy Daily Treatment Note  Date:  2021    Patient Name:  Radha Chinchilla    :  1995  MRN: [id number]  Restrictions/Precautions:  None  Diagnosis:   Diagnosis: Bariatric  Date of Injury/Surgery: -  Treatment Diagnosis: Treatment Diagnosis: Bariatric    Insurance/Certification information: PT Insurance Information: Nargis Baig   Referring Physician:  Referring Practitioner: Dr. Jun Sutherland  Next Doctor Visit:  -  Plan of care signed (Y/N): N, sent 21    Outcome Measure: N/A  Visit# / total visits:   /2  Pain level: 0/10 at rest, 3-4/10 in L knee/back w/ activity    Goals:     Utilizing group and individual instruction, patient will be educated in physical activity/ exercise concepts including use of basic fitness equipment and developing a personal exercise program      Summary of Evaluation:   Pt is 27year old female currently enrolled in weight management program. Pt now has difficulties completing long distance walking and independent exercise due to fatigue and progressive pain. Pt demo deficits this date that include deconditioning, LBP, bilat hip pain, intermittent L knee pain, and recent L ankle pain due to a LOB. Pt will benefit with PT services with return for education on physical activity to safely increase activity to prevent injury and safe weight loss. Patient agrees with established plan of care and assisted in the development of their short term and long term goals. Patient had no adverse reaction with initial treatment and there are no barriers to learning. Demonstrates no mental or cognitive disorder      Subjective:  See eval       Any changes in Ambulatory Summary Sheet?   None        Objective:  See eval   Prior to today's treatment session, patient was screened for signs and symptoms related to COVID-19 including but not limited to verbally answering questions related to feeling ill, cough, or SOB, along with taking temperature via forehead thermometer. Patient presented with all negative signs and symptoms and had no fever >100 degrees Fahrenheit this date. Exercises: (No more than 4 columns)   Exercise/Equipment 4/7/21 #1 Date Date           WARM UP                     TABLE      Long Sit Hamstring Stretch Demo                                 STANDING                                                     PROPRIOCEPTION                                    MODALITIES                      Other Therapeutic Activities/Education:  Patient received education on their current pathology and how their condition effects them with their functional activities. Patient understood discussion and questions were answered. Patient understands their activity limitations and understands rational for treatment progression. Home Exercise Program:  HO issued, reviewed and discussed with patient. Pt agreed to comply. Manual Treatments:  -      Modalities:  -      Communication with other providers:  Floral sent 4/7/21      Assessment:  (Response towards treatment session) (Pain Rating)  Pt is 27year old female currently enrolled in weight management program. Pt now has difficulties completing long distance walking and independent exercise due to fatigue and progressive pain. Pt demo deficits this date that include deconditioning, LBP, bilat hip pain, intermittent L knee pain, and recent L ankle pain due to a LOB. Pt will benefit with PT services with return for education on physical activity to safely increase activity to prevent injury and safe weight loss. Patient agrees with established plan of care and assisted in the development of their short term and long term goals. Patient had no adverse reaction with initial treatment and there are no barriers to learning.  Demonstrates no mental or cognitive disorder      Plan for Next Session: Specific instructions for Next Treatment: Bariatric Wellness Class      Time In / Time Out:  7859-7064         If BWC Please Indicate Time In/Out/Total Time  CPT Code Time in Time out Total Time                                                            Total for session             Timed Code/Total Treatment Minutes:  36'   (1) PT Eval 30'  (1) TE 10'      Next Progress Note due:  Eval sent 4/7/21     Plan of Care Interventions:  [x] Therapeutic Exercise  [] Modalities:  [x] Therapeutic Activity     [] Ultrasound  [] Estim  [] Gait Training      [] Cervical Traction [] Lumbar Traction  [x] Neuromuscular Re-education    [] Cold/hotpack [] Iontophoresis   [x] Instruction in HEP      [] Vasopneumatic   [] Dry Needling    [] Manual Therapy               [] Aquatic Therapy              Electronically signed by:  Royal Sapp, PT, DPT, OCS 4/7/2021, 10:06 AM    4/7/2021 10:06 AM   Sonny Wisdom, SPT

## 2021-04-07 NOTE — PLAN OF CARE
Outpatient Physical Therapy           [place]           [] Phone: [phone]   Fax: [phone]  [place]           [] Phone: [phone]   Fax: [phone]     To: Referring Practitioner: Dr. Monika Cruz   From: Kellie Rivero, PT     Patient: Zain Soni       : 1995  Diagnosis: Diagnosis: Bariatric   Treatment Diagnosis: Treatment Diagnosis: Bariatric   Date: 2021    Physical Therapy Certification/Re-Certification Form  Dear Dr. Monika Cruz  The following patient has been evaluated for physical therapy services and for therapy to continue, insurance requires physician review of the treatment plan initially and every 90 days. Please review the attached evaluation and/or summary of the patient's plan of care, and verify that you agree therapy should continue by signing the attached document and sending it back to our office. Assessment:      Pt is 27year old female currently enrolled in weight management program. Pt now has difficulties completing long distance walking and independent exercise due to fatigue and progressive pain. Pt demo deficits this date that include deconditioning, LBP, bilat hip pain, intermittent L knee pain, and recent L ankle pain due to a LOB. Pt will benefit with PT services with return for education on physical activity to safely increase activity to prevent injury and safe weight loss. Patient agrees with established plan of care and assisted in the development of their short term and long term goals. Patient had no adverse reaction with initial treatment and there are no barriers to learning.  Demonstrates no mental or cognitive disorder    Plan of Care/Treatment to date:  [x] Therapeutic Exercise  [] Modalities:  [x] Therapeutic Activity     [] Ultrasound  [] Electrical Stimulation  [] Gait Training      [] Cervical Traction [] Lumbar Traction  [] Neuromuscular Re-education    [] Cold/hotpack [] Iontophoresis   [x] Instruction in HEP      [] Vasopneumatic    [] Dry Needling  [] Manual Therapy               [] Aquatic Therapy       Other:          Frequency/Duration:  # Days per week: [x] 1 day # Weeks: [] 1 week [] 5 weeks     [] 2 days   [x] 2 weeks [] 6 weeks     [] 3 days   [] 3 weeks [] 7 weeks     [] 4 days   [] 4 weeks [] 8 weeks         [] 9 weeks [] 10 weeks         [] 11 weeks [] 12 weeks    Rehab Potential/Progress: [] Excellent [x] Good [] Fair  [] Poor     Goals:    Utilizing group and individual instruction, patient will be educated in physical activity/ exercise concepts including use of basic fitness equipment and developing a personal exercise program      Electronically signed by:  Ren Mi PT, DPT, OCS 4/7/2021, 10:05 AM    4/7/2021 10:05 AM         If you have any questions or concerns, please don't hesitate to call.   Thank you for your referral.      Physician Signature:________________________________Date:_________ TIME: _____  By signing above, therapists plan is approved by physician

## 2021-04-07 NOTE — PROGRESS NOTES
Physical Therapy  Initial Assessment  Date: 2021  Patient Name: Massimo January  MRN: [id number]  : 1995     Treatment Diagnosis: Bariatric    Subjective   General  Chart Reviewed: Yes  Patient assessed for rehabilitation services?: Yes  Referring Practitioner: Dr. Lexii Serrano  Diagnosis: Bariatric  Follows Commands: Within Functional Limits  PT Visit Information  PT Insurance Information: [address]: Pt reports starting the program 3/19/21. She has tried being more active since starting the program and wlaking with her fiance. 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 She has been taking time for her mental health. She is eating 1200 calorie diet currently and has been doing well. She does report L ankle discomfort, but 3 previous fractures w/o surgery and constant pain; L knee cramps when it is bent. Pain Screening  Patient Currently in Pain: Yes  Pain Assessment  Pain Assessment: 0-10  Pain Level: 0(0/10 at rest, hips begin to hurt after too long of sitting; ankle recent pain w/ walking)  Pain Type: Chronic pain;Acute pain  Pain Location: Ankle;Knee;Back  Pain Orientation: Left  Pain Descriptors: Aching; Shooting; Sharp  Pain Frequency: Continuous  Pain Onset: Awakened from sleep(difficulty staying asleep)  Clinical Progression: Not changed  Vital Signs  Patient Currently in Pain: Yes    Orientation  Orientation  Overall Orientation Status: Within Normal Limits    Objective  Observation/Palpation  Posture: Good    AROM RLE (degrees)  RLE AROM: WNL  AROM LLE (degrees)  LLE AROM : WNL  LLE General AROM: Excessive ankle inversion  Spine  Lumbar: No TTP along lumbar SP's    Strength RLE  R Hip Flexion: 4+/5  R Hip Extension: 4/5  R Hip ABduction: 4+/5  R Hip ADduction: 4+/5  R Hip Internal Rotation: 5/5  R Hip External Rotation: 5/5  R Knee Flexion: 5/5  R Knee Extension: 5/5  Strength LLE  L Hip Flexion: 4+/5  L Hip Extension: 4/5  L Hip ABduction: 4/5  L Hip ADduction: 4/5  L Hip Internal Rotation: 5/5  L Hip External Rotation: 5/5  L Knee Flexion: 5/5  L Knee Extension: 5/5  L Ankle Dorsiflexion: 5/5  L Ankle Plantar Flexion: 5/5  L Ankle Inversion: 5/5  L Ankle Eversion: 5/5  Strength Other  Other: ABle to complete SL bridge on bilat LE w/ good technique     Additional Measures  Flexibility: Hamstring mobility WNL; hip flexor limited L>R  Other: 30 sec sit to stand: 9, 4/10 L knee 6MWT: 102 BPM, 97% O2 sat, 1,366 ft 11 BPM, 99% O2 sat     Assessment   Conditions Requiring Skilled Therapeutic Intervention  Body structures, Functions, Activity limitations: Decreased functional mobility ; Decreased strength;Decreased balance; Increased pain;Decreased endurance  Pt is 27year old female currently enrolled in weight management program. Pt now has difficulties completing long distance walking and independent exercise due to fatigue and progressive pain. Pt demo deficits this date that include deconditioning, LBP, bilat hip pain, intermittent L knee pain, and recent L ankle pain due to a LOB. Pt will benefit with PT services with return for education on physical activity to safely increase activity to prevent injury and safe weight loss. Patient agrees with established plan of care and assisted in the development of their short term and long term goals. Patient had no adverse reaction with initial treatment and there are no barriers to learning.  Demonstrates no mental or cognitive disorder  Treatment Diagnosis: Bariatric  Prognosis: Good  Decision Making: Low Complexity  Barriers to Learning: None  REQUIRES PT FOLLOW UP: No  Activity Tolerance  Activity Tolerance: Patient Tolerated treatment well;Patient limited by fatigue;Patient limited by pain       Plan   Plan  Times per week: 1x  Plan weeks: 2 weeks  Specific instructions for Next Treatment: Bariatric Wellness Class  Current Treatment Recommendations:

## 2021-04-09 ENCOUNTER — ANESTHESIA EVENT (OUTPATIENT)
Dept: ENDOSCOPY | Age: 26
End: 2021-04-09
Payer: MEDICAID

## 2021-04-09 NOTE — ANESTHESIA PRE PROCEDURE
Department of Anesthesiology  Preprocedure Note       Name:  Juanjo Goldstein   Age:  27 y.o.  :  1995                                          MRN:  [id number]         Date:  2021      Surgeon: Lisy Cottrell):  Carissa Palmer MD    Procedure: Procedure(s):  EGD BIOPSY    Medications prior to admission:   Prior to Admission medications    Medication Sig Start Date End Date Taking? Authorizing Provider   Multiple Vitamins-Minerals (THERAPEUTIC MULTIVITAMIN-MINERALS) tablet Take 1 tablet by mouth daily   Yes Historical Provider, MD   topiramate (TOPAMAX) 25 MG tablet Take 25 mg by mouth 2 times daily   Yes Historical Provider, MD   buPROPion (WELLBUTRIN) 75 MG tablet Take 100 mg by mouth daily    Yes Historical Provider, MD   cholestyramine (QUESTRAN) 4 g packet Take 1 packet by mouth 3 times daily 3/19/21  Yes Carissa Palmer MD   sertraline (ZOLOFT) 50 MG tablet Take 1 tablet by mouth daily 20  Yes MIREYA Euceda CNP   busPIRone (BUSPAR) 10 MG tablet TAKE ONE-HALF TABLET BY MOUTH THREE TIMES A DAY AS NEEDED FOR ANXIETY  Patient taking differently: Take 15 mg by mouth 3 times daily TAKE ONE TABLET BY MOUTH THREE TIMES A DAY AS NEEDED FOR ANXIETY 20  Yes MIREYA Euceda CNP   naproxen (NAPROSYN) 500 MG tablet Take 1 tablet by mouth 2 times daily as needed for Pain  Patient not taking: Reported on 3/19/2021 8/2/20   ROXY Priest   methocarbamol (ROBAXIN) 500 MG tablet Take 1 tablet by mouth 4 times daily As needed for muscle spasm. Patient not taking: Reported on 3/19/2021 8/2/20   ROXY Priest   EPINEPHrine (EPIPEN 2-CLEOPATRA) 0.3 MG/0.3ML SOAJ injection Use as directed for allergic reaction 20   MIREYA Euceda CNP       Current medications:    No current facility-administered medications for this encounter.       Current Outpatient Medications   Medication Sig Dispense Refill    Multiple Vitamins-Minerals (THERAPEUTIC MULTIVITAMIN-MINERALS) tablet Take 1 tablet by mouth daily      topiramate (TOPAMAX) 25 MG tablet Take 25 mg by mouth 2 times daily      buPROPion (WELLBUTRIN) 75 MG tablet Take 100 mg by mouth daily       cholestyramine (QUESTRAN) 4 g packet Take 1 packet by mouth 3 times daily 90 packet 3    sertraline (ZOLOFT) 50 MG tablet Take 1 tablet by mouth daily 30 tablet 5    busPIRone (BUSPAR) 10 MG tablet TAKE ONE-HALF TABLET BY MOUTH THREE TIMES A DAY AS NEEDED FOR ANXIETY (Patient taking differently: Take 15 mg by mouth 3 times daily TAKE ONE TABLET BY MOUTH THREE TIMES A DAY AS NEEDED FOR ANXIETY) 30 tablet 5    naproxen (NAPROSYN) 500 MG tablet Take 1 tablet by mouth 2 times daily as needed for Pain (Patient not taking: Reported on 3/19/2021) 30 tablet 0    methocarbamol (ROBAXIN) 500 MG tablet Take 1 tablet by mouth 4 times daily As needed for muscle spasm. (Patient not taking: Reported on 3/19/2021) 20 tablet 0    EPINEPHrine (EPIPEN 2-CLEOPATRA) 0.3 MG/0.3ML SOAJ injection Use as directed for allergic reaction 2 each 0       Allergies:     Allergies   Allergen Reactions    Latex Hives and Rash    Macrobid [Nitrofurantoin Macrocrystal] Hives     No SOB or closing of throat noted when pt had reaction      Norco [Hydrocodone-Acetaminophen]      Makes her really sick to her stomach; hard to wake up with,it keeps her asleep    Nuvaring [Etonogestrel-Ethinyl Estradiol] Other (See Comments)     Severe cramping and bleeding    Other      Prolene suture    Ropivacaine     Tape Theron Shines Tape] Rash       Problem List:    Patient Active Problem List   Diagnosis Code    Diarrhea R19.7    Non morbid obesity due to excess calories E66.09    Migraine without aura and without status migrainosus, not intractable G43.009    Depression F32.9    Muscle spasm of back M62.830    Anxiety F41.9    Latex allergy Z91.040    S/P laparoscopic cholecystectomy Z90.49    Morbid obesity with BMI of 40.0-44.9, adult ([facility]) E66.01, Z68.41    Esophageal reflux K21.9       Past Medical History:        Diagnosis Date    37 weeks gestation of pregnancy 6/28/2016    Anemia     History of postpartum hemorrhage, currently pregnant in third trimester 6/28/2016    Migraines     Preeclampsia 2014    Sciatica        Past Surgical History:        Procedure Laterality Date    CHOLECYSTECTOMY  12/13/2018    DENTAL SURGERY      wisdom teeth    HYSTERECTOMY      SALPINGECTOMY         Social History:    Social History     Tobacco Use    Smoking status: Never Smoker    Smokeless tobacco: Never Used   Substance Use Topics    Alcohol use: Not Currently                                Counseling given: Not Answered      Vital Signs (Current):   Vitals:    04/05/21 1434   Weight: 248 lb (112.5 kg)   Height: 5' 3\" (1.6 m)                                              BP Readings from Last 3 Encounters:   04/05/21 118/70   03/19/21 118/74   09/11/20 125/80       NPO Status:                                              12 hrs. BMI:   Wt Readings from Last 3 Encounters:   03/19/21 251 lb 8 oz (114.1 kg)   08/02/20 220 lb (99.8 kg)   11/17/19 223 lb (101.2 kg)     Body mass index is 43.93 kg/m². CBC:   Lab Results   Component Value Date    WBC 8.8 03/24/2021    RBC 4.79 03/24/2021    HGB 13.3 03/24/2021    HCT 40.4 03/24/2021    MCV 84.3 03/24/2021    RDW 13.2 03/24/2021     03/24/2021       CMP:   Lab Results   Component Value Date     03/24/2021    K 4.5 03/24/2021     03/24/2021    CO2 22 03/24/2021    BUN 17 03/24/2021    CREATININE 0.9 03/24/2021    GFRAA >60 03/24/2021    AGRATIO 1.7 11/07/2018    LABGLOM >60 03/24/2021    GLUCOSE 90 03/24/2021    PROT 7.0 03/24/2021    CALCIUM 9.5 03/24/2021    BILITOT 0.3 03/24/2021    ALKPHOS 68 03/24/2021    AST 19 03/24/2021    ALT 21 03/24/2021       POC Tests: No results for input(s): POCGLU, POCNA, POCK, POCCL, POCBUN, POCHEMO, POCHCT in the last 72 hours.     Coags: No results found for: PROTIME, INR, APTT    HCG (If Applicable):   Lab Results   Component Value Date    PREGTESTUR NEGATIVE 02/10/2018        ABGs: No results found for: PHART, PO2ART, PZT4UST, HOI7TYH, BEART, P4CQASQO     Type & Screen (If Applicable):  No results found for: LABABO, LABRH    Drug/Infectious Status (If Applicable):  No results found for: HIV, HEPCAB    COVID-19 Screening (If Applicable):   Lab Results   Component Value Date    COVID19 NOT DETECTED 04/05/2021           Anesthesia Evaluation  Patient summary reviewed no history of anesthetic complications:   Airway: Mallampati: II  TM distance: >3 FB   Neck ROM: full  Mouth opening: > = 3 FB Dental: normal exam         Pulmonary:Negative Pulmonary ROS and normal exam                               Cardiovascular:  Exercise tolerance: good (>4 METS),   (+) hypertension:,          Beta Blocker:  Not on Beta Blocker         Neuro/Psych:   (+) headaches: migraine headaches, psychiatric history:            GI/Hepatic/Renal:   (+) GERD:, morbid obesity          Endo/Other: Negative Endo/Other ROS                    Abdominal:   (+) obese,         Vascular: negative vascular ROS. Anesthesia Plan      MAC     ASA 2       Induction: intravenous. Anesthetic plan and risks discussed with patient. MIREYA Ackerman CRNA   4/9/2021        Pre Anesthesia Evaluation complete. Anesthesia plan, risks, benefits, alternatives, and personnel discussed with patient and/or legal guardian. Patient and/or legal guardian verbalized an understanding and agreed to proceed. Anesthesia plan discussed with care team members and agreed upon.   MIREYA Ackerman CRNA  4/12/2021

## 2021-04-12 ENCOUNTER — ANESTHESIA (OUTPATIENT)
Dept: ENDOSCOPY | Age: 26
End: 2021-04-12
Payer: MEDICAID

## 2021-04-12 ENCOUNTER — HOSPITAL ENCOUNTER (OUTPATIENT)
Age: 26
Setting detail: OUTPATIENT SURGERY
Discharge: HOME | End: 2021-04-12
Attending: SURGERY | Admitting: SURGERY
Payer: MEDICAID

## 2021-04-12 VITALS
SYSTOLIC BLOOD PRESSURE: 118 MMHG | WEIGHT: 248 LBS | BODY MASS INDEX: 43.94 KG/M2 | DIASTOLIC BLOOD PRESSURE: 64 MMHG | OXYGEN SATURATION: 100 % | RESPIRATION RATE: 16 BRPM | TEMPERATURE: 97.8 F | HEIGHT: 63 IN | HEART RATE: 72 BPM

## 2021-04-12 VITALS — SYSTOLIC BLOOD PRESSURE: 82 MMHG | DIASTOLIC BLOOD PRESSURE: 45 MMHG | OXYGEN SATURATION: 100 %

## 2021-04-12 LAB — CLOTEST: POSITIVE

## 2021-04-12 PROCEDURE — 7100000010 HC PHASE II RECOVERY - FIRST 15 MIN: Performed by: SURGERY

## 2021-04-12 PROCEDURE — 2709999900 HC NON-CHARGEABLE SUPPLY: Performed by: SURGERY

## 2021-04-12 PROCEDURE — 88305 TISSUE EXAM BY PATHOLOGIST: CPT

## 2021-04-12 PROCEDURE — 3700000000 HC ANESTHESIA ATTENDED CARE: Performed by: SURGERY

## 2021-04-12 PROCEDURE — 2580000003 HC RX 258: Performed by: ANESTHESIOLOGY

## 2021-04-12 PROCEDURE — 6360000002 HC RX W HCPCS: Performed by: NURSE ANESTHETIST, CERTIFIED REGISTERED

## 2021-04-12 PROCEDURE — 2580000003 HC RX 258: Performed by: SURGERY

## 2021-04-12 PROCEDURE — 3700000001 HC ADD 15 MINUTES (ANESTHESIA): Performed by: SURGERY

## 2021-04-12 PROCEDURE — 87077 CULTURE AEROBIC IDENTIFY: CPT

## 2021-04-12 PROCEDURE — 43239 EGD BIOPSY SINGLE/MULTIPLE: CPT | Performed by: SURGERY

## 2021-04-12 PROCEDURE — 7100000011 HC PHASE II RECOVERY - ADDTL 15 MIN: Performed by: SURGERY

## 2021-04-12 PROCEDURE — 88342 IMHCHEM/IMCYTCHM 1ST ANTB: CPT

## 2021-04-12 PROCEDURE — 3609012400 HC EGD TRANSORAL BIOPSY SINGLE/MULTIPLE: Performed by: SURGERY

## 2021-04-12 RX ORDER — SODIUM CHLORIDE, SODIUM LACTATE, POTASSIUM CHLORIDE, CALCIUM CHLORIDE 600; 310; 30; 20 MG/100ML; MG/100ML; MG/100ML; MG/100ML
INJECTION, SOLUTION INTRAVENOUS CONTINUOUS
Status: DISCONTINUED | OUTPATIENT
Start: 2021-04-12 | End: 2021-04-12 | Stop reason: HOSPADM

## 2021-04-12 RX ORDER — PROPOFOL 10 MG/ML
INJECTION, EMULSION INTRAVENOUS PRN
Status: DISCONTINUED | OUTPATIENT
Start: 2021-04-12 | End: 2021-04-12 | Stop reason: SDUPTHER

## 2021-04-12 RX ORDER — SODIUM CHLORIDE, SODIUM LACTATE, POTASSIUM CHLORIDE, CALCIUM CHLORIDE 600; 310; 30; 20 MG/100ML; MG/100ML; MG/100ML; MG/100ML
INJECTION, SOLUTION INTRAVENOUS ONCE
Status: COMPLETED | OUTPATIENT
Start: 2021-04-12 | End: 2021-04-12

## 2021-04-12 RX ADMIN — PROPOFOL 350 MG: 10 INJECTION, EMULSION INTRAVENOUS at 08:15

## 2021-04-12 RX ADMIN — SODIUM CHLORIDE, POTASSIUM CHLORIDE, SODIUM LACTATE AND CALCIUM CHLORIDE: 600; 310; 30; 20 INJECTION, SOLUTION INTRAVENOUS at 08:01

## 2021-04-12 RX ADMIN — SODIUM CHLORIDE, POTASSIUM CHLORIDE, SODIUM LACTATE AND CALCIUM CHLORIDE: 600; 310; 30; 20 INJECTION, SOLUTION INTRAVENOUS at 08:08

## 2021-04-12 ASSESSMENT — PAIN - FUNCTIONAL ASSESSMENT: PAIN_FUNCTIONAL_ASSESSMENT: 0-10

## 2021-04-12 ASSESSMENT — PAIN SCALES - GENERAL
PAINLEVEL_OUTOF10: 0

## 2021-04-12 NOTE — PROGRESS NOTES
Patient tolerated procedure well. Patient drowsy but arousable to voice, no pain or needs voiced. Patient returned to [facility] room 21 via stretcher. Bedside report given to BRO Nice.

## 2021-04-12 NOTE — H&P
HISTORY AND PHYSICAL EXAM    Date of Admission:  4/12/2021    PCP:  MIREYA Plata CNP    Chief Complaint / History of Present Illness:  Ursula Vidal is a very pleasant 27 y.o. female who presents today for her preop EGD eval before her bariatric procedure. As noted her Body mass index is 43.93 kg/m². with significant co-morbidities as below. The patient has been complying with the pre-operative workup, lifestyle modifications and changes with the bariatric clinic, including:  Dietitian evaluation and counseling, psychologist evaluation and counseling, Exercise physiologist evaluation and counseling, cardiac preoperative clearance and optimization, pulmonology preoperative clearance and optimization, today will proceed with preoperative EGD for GERD, morbid obesity: Body mass index is 43.93 kg/m². , in preparation for a bariatric procedure; to r/o GERD, Hiatal Hernia, Peptic Ulcer Disease, Gastroparesis, Esophageal dysmotility; etc.    All risks and benefits, potential complications and possible alternatives discussed, and agreeable to proceed. PMH:   has a past medical history of 37 weeks gestation of pregnancy (6/28/2016), Anemia, History of postpartum hemorrhage, currently pregnant in third trimester (6/28/2016), Migraines, Preeclampsia (2014), and Sciatica. PSH:   has a past surgical history that includes Dental surgery; salpingectomy; Hysterectomy; and Cholecystectomy (12/13/2018). Allergies:     Allergies   Allergen Reactions    Latex Hives and Rash    Macrobid [Nitrofurantoin Macrocrystal] Hives     No SOB or closing of throat noted when pt had reaction      Norco [Hydrocodone-Acetaminophen]      Makes her really sick to her stomach; hard to wake up with,it keeps her asleep    Nuvaring [Etonogestrel-Ethinyl Estradiol] Other (See Comments)     Severe cramping and bleeding    Other      Prolene suture    Ropivacaine     Tape [Adhesive Tape] Rash        Home Meds:    Prior to Admission medications    Medication Sig Start Date End Date Taking?  Authorizing Provider   Multiple Vitamins-Minerals (THERAPEUTIC MULTIVITAMIN-MINERALS) tablet Take 1 tablet by mouth daily   Yes Historical Provider, MD   topiramate (TOPAMAX) 25 MG tablet Take 25 mg by mouth 2 times daily   Yes Historical Provider, MD   buPROPion (WELLBUTRIN) 75 MG tablet Take 100 mg by mouth daily    Yes Historical Provider, MD   cholestyramine (QUESTRAN) 4 g packet Take 1 packet by mouth 3 times daily 3/19/21  Yes Kostas Miranda MD   sertraline (ZOLOFT) 50 MG tablet Take 1 tablet by mouth daily 7/13/20  Yes Flaquito Trejo APRN - CNP   busPIRone (BUSPAR) 10 MG tablet TAKE ONE-HALF TABLET BY MOUTH THREE TIMES A DAY AS NEEDED FOR ANXIETY  Patient taking differently: Take 15 mg by mouth 3 times daily TAKE ONE TABLET BY MOUTH THREE TIMES A DAY AS NEEDED FOR ANXIETY 7/13/20  Yes Flaquito Trejo APRN - CNP   EPINEPHrine (EPIPEN 2-CLEOPATRA) 0.3 MG/0.3ML SOAJ injection Use as directed for allergic reaction 7/13/20   Flaquito Trejo APRN - [address] Meds:    Current Facility-Administered Medications   Medication Dose Route Frequency Provider Last Rate Last Admin    lactated ringers infusion   Intravenous Once Kostas Miranda MD        lactated ringers infusion   Intravenous Continuous Davidson Barahona  mL/hr at 04/12/21 0801 New Bag at 04/12/21 0801      lactated ringers      lactated ringers 100 mL/hr at 04/12/21 0801       Social History / Family History:        Social History     Socioeconomic History    Marital status:      Spouse name: None    Number of children: None    Years of education: None    Highest education level: None   Occupational History    None   Social Needs    Financial resource strain: None    Food insecurity     Worry: None     Inability: None    Transportation needs     Medical: None     Non-medical: None   Tobacco Use    Smoking status: Never Smoker    Smokeless tobacco: Never Used Regular rate and rhythm, S1, S2 normal, no murmur, rub or gallop. Abdomen: Non tender. Non distended. Positive bowel sounds. No hernias noted, no masses palpable. Extremities: Warm, well perfused, no cyanosis or edema. Skin: Skin color, texture, turgor normal.  Neurologic: Grossly normal, Cranial nerves from II to XII intact. Radiologic / Imaging / TESTING  Egd    Result Date: 4/12/2021  No dictation         Labs:    No results found for this or any previous visit (from the past 24 hour(s)). Diagnosis:  Patient Active Problem List   Diagnosis    Diarrhea    Non morbid obesity due to excess calories    Migraine without aura and without status migrainosus, not intractable    Depression    Muscle spasm of back    Anxiety    Latex allergy    S/P laparoscopic cholecystectomy    Morbid obesity with BMI of 40.0-44.9, adult ([facility])    Esophageal reflux           Assessment & Plan:    Jacqueline Zhang is a very pleasant 27 y.o. female who presents today for her preop EGD eval before her bariatric procedure. As noted her Body mass index is 43.93 kg/m². with significant co-morbidities as below. The patient has been complying with the pre-operative workup, lifestyle modifications and changes with the bariatric clinic, including:  Dietitian evaluation and counseling, psychologist evaluation and counseling, Exercise physiologist evaluation and counseling, cardiac preoperative clearance and optimization, pulmonology preoperative clearance and optimization, today will proceed with preoperative EGD for GERD, morbid obesity: Body mass index is 43.93 kg/m². , in preparation for a bariatric procedure; to r/o GERD, Hiatal Hernia, Peptic Ulcer Disease, Gastroparesis, Esophageal dysmotility; etc.    All risks and benefits, potential complications and possible alternatives discussed, and agreeable to proceed.     ____________________________________________    Casimiro Quintanilla MD, FACS, FICS    4/12/2021  8:03 AM

## 2021-04-12 NOTE — ANESTHESIA POSTPROCEDURE EVALUATION
Department of Anesthesiology  Postprocedure Note    Patient: Juanjo Goldstein  MRN: [id number]  YOB: 1995  Date of evaluation: 4/12/2021  Time:  8:32 AM     Procedure Summary     Date: 04/12/21 Room / Location: [address]    Anesthesia Start: 7752 Anesthesia Stop: 8573    Procedure: EGD BIOPSY (N/A ) Diagnosis: (MORBID OBESITY)    Surgeons: Carissa Palmer MD Responsible Provider: Jorge Alberto Sosa MD    Anesthesia Type: MAC ASA Status: 2          Anesthesia Type: MAC    Timothy Phase I:  10    Timothy Phase II:  10    Last vitals: Reviewed and per EMR flowsheets.        Anesthesia Post Evaluation    Patient location during evaluation: bedside  Patient participation: complete - patient participated  Level of consciousness: awake and alert  Pain score: 0  Airway patency: patent  Nausea & Vomiting: no nausea and no vomiting  Complications: no  Cardiovascular status: hemodynamically stable  Respiratory status: acceptable, room air, spontaneous ventilation and nonlabored ventilation  Hydration status: euvolemic

## 2021-04-16 ENCOUNTER — OFFICE VISIT (OUTPATIENT)
Dept: BARIATRICS/WEIGHT MGMT | Age: 26
End: 2021-04-16
Payer: MEDICAID

## 2021-04-16 VITALS
SYSTOLIC BLOOD PRESSURE: 102 MMHG | BODY MASS INDEX: 41.05 KG/M2 | HEIGHT: 63 IN | DIASTOLIC BLOOD PRESSURE: 78 MMHG | WEIGHT: 231.7 LBS | HEART RATE: 82 BPM

## 2021-04-16 DIAGNOSIS — E66.01 MORBID OBESITY WITH BMI OF 40.0-44.9, ADULT (HCC): Primary | ICD-10-CM

## 2021-04-16 PROCEDURE — G8417 CALC BMI ABV UP PARAM F/U: HCPCS | Performed by: SURGERY

## 2021-04-16 PROCEDURE — 1036F TOBACCO NON-USER: CPT | Performed by: SURGERY

## 2021-04-16 PROCEDURE — G8427 DOCREV CUR MEDS BY ELIG CLIN: HCPCS | Performed by: SURGERY

## 2021-04-16 PROCEDURE — 99213 OFFICE O/P EST LOW 20 MIN: CPT | Performed by: SURGERY

## 2021-04-16 RX ORDER — LANSOPRAZOLE, AMOXICILLIN, CLARITHROMYCIN 30-500-500
KIT ORAL 2 TIMES DAILY
Qty: 28 EACH | Refills: 3 | Status: SHIPPED
Start: 2021-04-16 | End: 2021-06-03 | Stop reason: CLARIF

## 2021-04-16 ASSESSMENT — ENCOUNTER SYMPTOMS
SORE THROAT: 0
VOICE CHANGE: 0
COLOR CHANGE: 0
TROUBLE SWALLOWING: 0
COUGH: 0
ABDOMINAL DISTENTION: 1
DIARRHEA: 0
NAUSEA: 0
BLOOD IN STOOL: 0
ABDOMINAL PAIN: 1
CONSTIPATION: 0
BACK PAIN: 1
WHEEZING: 0
VOMITING: 0
PHOTOPHOBIA: 0
ANAL BLEEDING: 0
SHORTNESS OF BREATH: 1

## 2021-04-16 NOTE — PROGRESS NOTES
BARIATRIC SURGERY OFFICE NOTE    SUBJECTIVE:    Patient presenting today originally referred from MIREYA Holm CNP, for   Chief Complaint   Patient presents with    Weight Management     2nd  Visit   . HPI: Zain Soni is a 27 y.o. female being seen for follow up for bariatric weight loss surgery. Estefani'slast month weight gain/loss was -19.8 Lbs!!!!!!!.     Needs EGD, and Card/pulm/psych clearances. Thoroughly reviewed the patient's medical history, family history, social history and review of systems with the patient today in theoffice. Please see medical record for pertinent positives.       Past Medical History:   Diagnosis Date    37 weeks gestation of pregnancy 6/28/2016    Anemia     History of postpartum hemorrhage, currently pregnant in third trimester 6/28/2016    Migraines     Preeclampsia 2014    Sciatica       Past Surgical History:   Procedure Laterality Date    CHOLECYSTECTOMY  12/13/2018    DENTAL SURGERY      wisdom teeth    HYSTERECTOMY      SALPINGECTOMY      UPPER GASTROINTESTINAL ENDOSCOPY N/A 4/12/2021    EGD BIOPSY performed by Sasha Ballesteros MD at [facility] ENDOSCOPY     Current Outpatient Medications   Medication Sig Dispense Refill    amoxicillin-clarithromycin-lansoprazole (PREVPAC) combo pack Take by mouth 2 times daily 28 each 3    Multiple Vitamins-Minerals (THERAPEUTIC MULTIVITAMIN-MINERALS) tablet Take 1 tablet by mouth daily      topiramate (TOPAMAX) 25 MG tablet Take 25 mg by mouth 2 times daily      buPROPion (WELLBUTRIN) 75 MG tablet Take 100 mg by mouth daily       cholestyramine (QUESTRAN) 4 g packet Take 1 packet by mouth 3 times daily 90 packet 3    sertraline (ZOLOFT) 50 MG tablet Take 1 tablet by mouth daily 30 tablet 5    busPIRone (BUSPAR) 10 MG tablet TAKE ONE-HALF TABLET BY MOUTH THREE TIMES A DAY AS NEEDED FOR ANXIETY (Patient taking differently: Take 15 mg by mouth 3 times daily TAKE ONE TABLET BY MOUTH THREE TIMES A DAY AS NEEDED FOR ANXIETY) 30 tablet 5    EPINEPHrine (EPIPEN 2-CLEOPATRA) 0.3 MG/0.3ML SOAJ injection Use as directed for allergic reaction 2 each 0     No current facility-administered medications for this visit. Allergies   Allergen Reactions    Latex Hives and Rash    Macrobid [Nitrofurantoin Macrocrystal] Hives     No SOB or closing of throat noted when pt had reaction      Norco [Hydrocodone-Acetaminophen]      Makes her really sick to her stomach; hard to wake up with,it keeps her asleep    Nuvaring [Etonogestrel-Ethinyl Estradiol] Other (See Comments)     Severe cramping and bleeding    Other      Prolene suture    Ropivacaine     Tape Flori Burger Tape] Rash           Review of Systems   Constitutional: Positive for fatigue. Negative for activity change, chills, diaphoresis and fever. HENT: Negative for sore throat, trouble swallowing and voice change. Eyes: Negative for photophobia and visual disturbance. Respiratory: Positive for shortness of breath. Negative for cough and wheezing. Cardiovascular: Positive for leg swelling. Negative for chest pain and palpitations. Gastrointestinal: Positive for abdominal distention and abdominal pain. Negative for anal bleeding, blood in stool, constipation, diarrhea, nausea and vomiting. Endocrine: Positive for polyphagia. Negative for cold intolerance, heat intolerance, polydipsia and polyuria. Genitourinary: Positive for urgency. Negative for dysuria, frequency and hematuria. Musculoskeletal: Positive for arthralgias, back pain and gait problem. Negative for joint swelling, myalgias and neck stiffness. Skin: Negative for color change and rash. Neurological: Negative for seizures, speech difficulty, light-headedness and numbness. Hematological: Negative for adenopathy. Does not bruise/bleed easily. Psychiatric/Behavioral: Positive for sleep disturbance. The patient is nervous/anxious.           OBJECTIVE:    Vitals:    04/16/21 0900   BP: 102/78 Pulse: 82     Body mass index is 40.72 kg/m². Physical Exam  Vitals signs reviewed. Constitutional:       General: She is not in acute distress. Appearance: She is well-developed. She is not diaphoretic. HENT:      Head: Normocephalic and atraumatic. Eyes:      General: No scleral icterus. Conjunctiva/sclera: Conjunctivae normal.      Pupils: Pupils are equal, round, and reactive to light. Neck:      Musculoskeletal: Normal range of motion. Thyroid: No thyromegaly. Vascular: No JVD. Trachea: No tracheal deviation. Cardiovascular:      Rate and Rhythm: Normal rate and regular rhythm. Heart sounds: Normal heart sounds. No murmur. No friction rub. No gallop. Pulmonary:      Effort: Pulmonary effort is normal. No respiratory distress. Breath sounds: No stridor. No wheezing or rales. Chest:      Chest wall: No tenderness. Abdominal:      General: Bowel sounds are normal. There is no distension. Palpations: Abdomen is soft. There is no mass. Tenderness: There is no abdominal tenderness. There is no guarding or rebound. Musculoskeletal: Normal range of motion. General: No tenderness. Lymphadenopathy:      Cervical: No cervical adenopathy. Skin:     General: Skin is warm and dry. Coloration: Skin is not pale. Findings: No erythema or rash. Neurological:      Mental Status: She is alert and oriented to person, place, and time. Cranial Nerves: No cranial nerve deficit. Coordination: Coordination normal.   Psychiatric:         Behavior: Behavior normal.         Thought Content: Thought content normal.         Judgment: Judgment normal.                 ASSESSMENT & PLAN:    1. Morbid obesity with BMI of 40.0-44.9, adult [facility]         Will do EGD, and consulted Armando / Yrn Yu for preop clearances. Patient was encouraged to journal all food intake. Keep calorie level atapproximately 5170-3938.  Protein intake is to be a minimum of 60-80 grams per day. Water drinking was encouraged with a goal of 64oz-128oz daily. Beverages to be calorie free except for milk. Every other beverage should bewater. They are to avoid soda. Continue to increase level of physical activity. I counseled the patient regarding diet and exercise, in preparation for her planned Robotic Sleeve Gastrectomy. Counting calories, complying with the dietitian's recommendations, and complying with the preoperative workup including the dietitian counseling, exercise physiologist counseling,cardiologist evaluation and pre-operative optimization, pulmonologist evaluation and pre operative optimization, pre operative EGD evaluation. The patient expressed understanding and willingness to comply nicely; allquestions and concerns addressed in details. Patient counseled on the risks, benefits, and alternatives oftreatment plan at length while in the office today. Patient states an understanding and willingness to proceed with the plan. Orders Placed This Encounter   Procedures    Ambulatory referral to Cardiology    Ambulatory referral to Pulmonology        Follow Up:  Return in about 4 weeks (around 5/14/2021) for Bariatric follow up: diet, exercise & weight loss.       Dionicio Nyhan, MD, FACS, FICS  Member of the [address] of Met[employer] and [employer]    [phone]    4/16/21

## 2021-04-28 ENCOUNTER — HOSPITAL ENCOUNTER (OUTPATIENT)
Dept: PHYSICAL THERAPY | Age: 26
Setting detail: THERAPIES SERIES
Discharge: HOME | End: 2021-04-28
Payer: MEDICAID

## 2021-04-28 PROCEDURE — 97150 GROUP THERAPEUTIC PROCEDURES: CPT

## 2021-04-28 NOTE — DISCHARGE SUMMARY
Outpatient Physical Therapy                                                                    [place]           [x] Phone: [phone]   Fax: [phone]  [place]           [] Phone: [phone]   Fax: [phone]          Physical Therapy Daily Treatment Note  Date:  2021    Patient Name:  Jacqueline Zhang    :  1995  MRN: [id number]  Restrictions/Precautions:    Pain level: 0/10     Jacqueline Zhang  was seen today for the physical activity education presentation. Patient was screened for Covid symptoms and their temperature was taken. Patient does not have symptoms warranting delaying services. Will proceed with today's session. The purpose of the group exercises presentation was to present information to the individual for fitness and wellness. Part one of the presentation we spoke about the reasons why exercises and wellness is beneficial. We presented data and examples of different symptoms in the body improve as well improvement with sleep, anxiety, depression weight loss and improve in lung function. Part 2- Setting goals was described. SMART goals and formation of short term and long term goals to help be successful. Patient was referred back to their packet for example worksheet. Part 3- Examples of how to get started. Planning your exercises routine to be successful. Remember to be start slow and gradually increase in the intensity at home. Drinking plenty of fluid and always perform a warm up or cool down. Part 4- Exercises Basics - demonstrated the importance of a 5-10 min warm up with light stretches. Cool down to allow the body to return to base line measure. They demonstrated how to take their heart rate and why it is important to know the target range zone for fitness. Explained the Do's and Don'ts and the PRICE principal if injured. Explained they are to seek medical advice if severe pain, numbness, swelling or radicular symptoms occur.     Patient was guided through an exercises routine with a 5-minute walking warm up with light stretches for the calf muscles, upper back, shoulder. Patient was guided through a simple circuit routine involving, dumbbell curls, marches, sit to stand, lateral dumbbell raises, step ups, lateral side steps, sled pull and wall taps. Patient received an explanation on how to conduct such routine at home and modifications to exercises. Patient did not actually go through such circuit secondary to Covid and class size. Patient did not have any adverse reactions after the group therapy and reported they felt fine no issues. Patient left with a exercises information packet and explained to call if there are any additional questions. Patient will be discharged from therapy services after today.      Billed 1 group charge     Time: 7214-9644    Ines López PT, DPT, [facility]    4/28/2021 8:49 AM

## 2021-04-29 ENCOUNTER — INITIAL CONSULT (OUTPATIENT)
Dept: CARDIOLOGY CLINIC | Age: 26
End: 2021-04-29
Payer: MEDICAID

## 2021-04-29 VITALS
HEIGHT: 63 IN | BODY MASS INDEX: 40.4 KG/M2 | HEART RATE: 80 BPM | DIASTOLIC BLOOD PRESSURE: 64 MMHG | SYSTOLIC BLOOD PRESSURE: 92 MMHG | WEIGHT: 228 LBS

## 2021-04-29 DIAGNOSIS — Z01.818 PREOPERATIVE CLEARANCE: Primary | ICD-10-CM

## 2021-04-29 PROCEDURE — 99243 OFF/OP CNSLTJ NEW/EST LOW 30: CPT | Performed by: INTERNAL MEDICINE

## 2021-04-29 PROCEDURE — G8427 DOCREV CUR MEDS BY ELIG CLIN: HCPCS | Performed by: INTERNAL MEDICINE

## 2021-04-29 PROCEDURE — G8417 CALC BMI ABV UP PARAM F/U: HCPCS | Performed by: INTERNAL MEDICINE

## 2021-04-29 PROCEDURE — 93000 ELECTROCARDIOGRAM COMPLETE: CPT | Performed by: INTERNAL MEDICINE

## 2021-04-29 NOTE — PROGRESS NOTES
CARDIOLOGY CONSULT NOTE   Reason for consultation:  preop for gastric sleeve    Referring physician:  JUSTINE Plata    Primary care physician: MIREYA Plata CNP      Dear Alina Zapata  Thanks for the consult. History of present illness:Estefani is a 27 y. 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 She  Was diagnosed with bipolar and anxiety   Blood pressure, cholesterol, blood glucose and weight are well controlled. Past medical history:    has a past medical history of 37 weeks gestation of pregnancy, Anemia, History of postpartum hemorrhage, currently pregnant in third trimester, Migraines, Preeclampsia, and Sciatica. Past surgical history:   has a past surgical history that includes Dental surgery; salpingectomy; Hysterectomy; Cholecystectomy (12/13/2018); and Upper gastrointestinal endoscopy (N/A, 4/12/2021). Social History:   reports that she has never smoked. She has never used smokeless tobacco. She reports previous alcohol use. She reports current drug use. Frequency: 7.00 times per week. Family history:  +family  DM    Allergies   Allergen Reactions    Latex Hives and Rash    Macrobid [Nitrofurantoin Macrocrystal] Hives     No SOB or closing of throat noted when pt had reaction      Norco [Hydrocodone-Acetaminophen]      Makes her really sick to her stomach; hard to wake up with,it keeps her asleep    Nuvaring [Etonogestrel-Ethinyl Estradiol] Other (See Comments)     Severe cramping and bleeding    Other      Prolene suture    Ropivacaine     Tape [Adhesive Tape] Rash       No current facility-administered medications for this visit.      Current Outpatient Medications   Medication Sig Dispense Refill    amoxicillin-clarithromycin-lansoprazole (PREVPAC) combo pack Take by mouth 2 times daily 28 each 3    Multiple Vitamins-Minerals (THERAPEUTIC MULTIVITAMIN-MINERALS) tablet Take 1 tablet by mouth daily      topiramate (TOPAMAX) 25 MG tablet Take 25 mg by mouth 2 times daily      buPROPion (WELLBUTRIN) 75 MG tablet Take 100 mg by mouth daily       cholestyramine (QUESTRAN) 4 g packet Take 1 packet by mouth 3 times daily 90 packet 3    sertraline (ZOLOFT) 50 MG tablet Take 1 tablet by mouth daily 30 tablet 5    busPIRone (BUSPAR) 10 MG tablet TAKE ONE-HALF TABLET BY MOUTH THREE TIMES A DAY AS NEEDED FOR ANXIETY (Patient taking differently: Take 15 mg by mouth 3 times daily TAKE ONE TABLET BY MOUTH THREE TIMES A DAY AS NEEDED FOR ANXIETY) 30 tablet 5    EPINEPHrine (EPIPEN 2-CLEOPATRA) 0.3 MG/0.3ML SOAJ injection Use as directed for allergic reaction (Patient not taking: Reported on 4/29/2021) 2 each 0     No current facility-administered medications for this visit. Review of Systems:   · Constitutional: No Fever or Weight Loss   · Eyes: No Decreased Vision  · ENT: No Headaches, Hearing Loss or Vertigo  · Cardiovascular: No chest pain, dyspnea on exertion, palpitations or loss of consciousness  · Respiratory: No cough or wheezing    · Gastrointestinal: No abdominal pain, appetite loss, blood in stools, constipation, diarrhea or heartburn  · Genitourinary: No dysuria, trouble voiding, or hematuria  · Musculoskeletal:  No gait disturbance, weakness or joint complaints  · Integumentary: No rash or pruritis  · Neurological: No TIA or stroke symptoms  · Psychiatric: No anxiety or depression  · Endocrine: No malaise, fatigue or temperature intolerance  · Hematologic/Lymphatic: No bleeding problems, blood clots or swollen lymph nodes  · Allergic/Immunologic: No nasal congestion or hives  All systems negative except as marked.      ·   ·      Physical Examination:    Vitals:    04/29/21 0935   BP: 92/64   Pulse: 80    rr 14  afebrile  Wt Readings from Last 3 Encounters:   04/29/21 228 lb (103.4 kg)   04/16/21 231 lb 11.2 oz (105.1 kg)   04/12/21 248 lb (112.5 kg)     Body mass index is 40.39 kg/m². General Appearance:  No distress, conversant    Constitutional:  Well developed, Well nourished, No acute distress, Non-toxic appearance. HENT:  Normocephalic, Atraumatic, Bilateral external ears normal, Oropharynx moist, No oral exudates, Nose normal. Neck- Normal range of motion, No tenderness, Supple, No stridor,no apical-carotid delay, no carotid bruit  Eyes:  PERRL, EOMI, Conjunctiva normal, No discharge. Respiratory:  Normal breath sounds, No respiratory distress, No wheezing, No chest tenderness. ,no use of accessory muscles, diaphragm movement is normal  Cardiovascular: (PMI) apex non displaced,no lifts no thrills, no s3,no s4, Normal heart rate, Normal rhythm, No murmurs, No rubs, No gallops. Carotid arteries pulse and amplitude are normal no bruit, no abdominal bruit noted ( normal abdominal aorta ausculation), femoral arteries pulse and amplitude are normal no bruit, pedal pulses are normal  GI:  Bowel sounds normal, Soft, No tenderness, No masses, No pulsatile masses, no hepatosplenomegally, no bruits  : External genitalia appear normal, No masses or lesions. No discharge. No CVA tenderness. Musculoskeletal:  Intact distal pulses, No edema, No tenderness, No cyanosis, No clubbing. Good range of motion in all major joints. No tenderness to palpation or major deformities noted. Back- No tenderness. Integument:  Warm, Dry, No erythema, No rash. Skin: no rash, no ulcers  Lymphatic:  No lymphadenopathy noted. Neurologic:  Alert & oriented x 3, Normal motor function, Normal sensory function, No focal deficits noted. Psychiatric:  Affect normal, Judgment normal, Mood normal.   Lab Review   No results for input(s): WBC, HGB, HCT, PLT in the last 72 hours. No results for input(s): NA, K, CL, CO2, PHOS, BUN, CREATININE in the last 72 hours.     Invalid input(s): CA  No results for input(s): AST, ALT, ALB, BILIDIR, BILITOT, ALKPHOS in the last 72 hours. No results for input(s): TROPONINI in the last 72 hours. No results found for: BNP  No results found for: INR, PROTIME      EKG:nsr, t wave inversion in lateral leads    Chest Xray:    ECHO:pending  Labs, echo, meds reviewed  Assessment: 27 y. o.year old with PMH of  has a past medical history of 37 weeks gestation of pregnancy, Anemia, History of postpartum hemorrhage, currently pregnant in third trimester, Migraines, Preeclampsia, and Sciatica. Recommendations:    1. preop for gastric sleeve: abnormal EKG with t wave inversion in lateral leads, will get stress test and echo  2. H/o severe preeclampsia  3. Anemia: resolved  4. OCD: on sertaline  5. Migraines: on topamax  6. Bipolar: on topamax and wellbutrin  All labs, medications and tests reviewed, continue all other medications of all above medical condition listed as is.          Flaquito Soares MD, 4/29/2021 10:04 AM

## 2021-04-29 NOTE — LETTER
Edmar Mayer Staff  1995  [id number]    Have you had any Chest Pain that is not new? - No     Have you had any Shortness of Breath - No  If Yes - When     Have you had any dizziness - Yes  If Yes DO ORTHOSTATIC BP - when do you feel dizzy with position change   How long does it last .3  minutes       Have you had any palpitations that are not new? - No       Do you have any edema - swelling in ankles  Due to previous injuries  If Yes - CHECK TO SEE IF THE EDEMA IS PITTING  How long have they been having edema - Years     Vein \"LEG PROBLEM Questionnaire\"  1. Do you have prominent leg veins? No   2. Do you have any skin discoloration? No  3. Do you have any healed or active sores? No  4. Do you have swelling of the legs? No  5. Do you have a family history of varicose veins? No  6. Does your profession involve pro-longed        standing or heavy lifting? Yes x 10 years  7. Have you been fighting overweight problems? Yes  8. Do you have restless legs? Yes  9. Do you have any night time cramps? Yes  10. Do you have any of the following in your legs:        NONE     When did you have your last labs drawn 3/24/2021 in epic    If we do not have these labs you are retrieve these labs for these providers! Do you have a surgery or procedure scheduled in the near future - Yes  If Yes- DO EKG  If Yes - Who is the surgery with?  Dr. Malinda Stokes  Phone number of physician     Fax number of physician     Type of surgery Gastric sleeve    GIVE THIS INFORMATION TO KARL FERRARI

## 2021-05-03 ENCOUNTER — OFFICE VISIT (OUTPATIENT)
Dept: BARIATRICS/WEIGHT MGMT | Age: 26
End: 2021-05-03

## 2021-05-03 VITALS — WEIGHT: 228 LBS | HEIGHT: 63 IN | BODY MASS INDEX: 40.4 KG/M2

## 2021-05-03 DIAGNOSIS — E66.01 MORBID OBESITY WITH BMI OF 40.0-44.9, ADULT (HCC): Primary | ICD-10-CM

## 2021-05-03 PROCEDURE — 99999 PR OFFICE/OUTPT VISIT,PROCEDURE ONLY: CPT

## 2021-05-03 NOTE — PROGRESS NOTES
Outpatient Nutrition Counseling    REASON FOR VISIT: Initial Nutrition Class    Chief Complaint:    Chief Complaint   Patient presents with    Weight Management       SUBJECTIVE:  Pt attended class. Instructed on mindful eating, label reading, calorie counting, healthy food choices and goal setting. Pt verbalized understanding to all info provided and signed goal card. The patient is a 27 y.o. female being seen for morbid obesity, considering weight loss surgery; Estefani's, Height: 5' 3\" (160 cm), Weight: 228 lb (103.4 kg), Current Body mass index is 40.39 kg/m². The patient's PCP is MIREYA William - CNP   The patient first recognized that she had a weight problem about 10 years ago. The patient's lowest weight in the last five years was 180 lbs, and the highest weight in the last five years was 250 lbs. Weight Loss Program History:  The patient states she has tried calorie counting. Themost weight lost ever with diet and exercise was 30 Lbs, but unfortunately only kept them of for a few months. Estefani's life is significantlyaffected by weight related to her co-morbidities. Comorbid Conditions:  Significant diseases affecting this patient are   Past Medical History:   Diagnosis Date    37 weeks gestation of pregnancy 6/28/2016    Anemia     History of postpartum hemorrhage, currently pregnant in third trimester 6/28/2016    Migraines     Preeclampsia 2014    Sciatica    . Review of Systems - Review of Systems  Otherwise per HPI. Allergies:   Allergies   Allergen Reactions    Latex Hives and Rash    Macrobid [Nitrofurantoin Macrocrystal] Hives     No SOB or closing of throat noted when pt had reaction      Norco [Hydrocodone-Acetaminophen]      Makes her really sick to her stomach; hard to wake up with,it keeps her asleep    Nuvaring [Etonogestrel-Ethinyl Estradiol] Other (See Comments)     Severe cramping and bleeding    Other      Prolene suture    Ropivacaine     Tape Tariq Lo Tape] Rash       Past Surgical History:  Past Surgical History:   Procedure Laterality Date    CHOLECYSTECTOMY  12/13/2018    DENTAL SURGERY      wisdom teeth    HYSTERECTOMY      SALPINGECTOMY      UPPER GASTROINTESTINAL ENDOSCOPY N/A 4/12/2021    EGD BIOPSY performed by Mary Gannon MD at [facility] ENDOSCOPY       Family History:  Family History   Problem Relation Age of Onset    Heart Disease Mother     Cancer Mother     Diabetes Father     High Blood Pressure Father     Coronary Art Dis Father     Heart Disease Brother     High Blood Pressure Brother     Learning Disabilities Brother     Diabetes Brother     Cancer Maternal Grandfather        Social History:  Social History     Socioeconomic History    Marital status:      Spouse name: Not on file    Number of children: Not on file    Years of education: Not on file    Highest education level: Not on file   Occupational History    Not on file   Social Needs    Financial resource strain: Not on file    Food insecurity     Worry: Not on file     Inability: Not on file    Transportation needs     Medical: Not on file     Non-medical: Not on file   Tobacco Use    Smoking status: Never Smoker    Smokeless tobacco: Never Used   Substance and Sexual Activity    Alcohol use: Not Currently    Drug use: Yes     Frequency: 7.0 times per week     Comment: CBD GUMMIES    Sexual activity: Yes     Partners: Male   Lifestyle    Physical activity     Days per week: Not on file     Minutes per session: Not on file    Stress: Not on file   Relationships    Social connections     Talks on phone: Not on file     Gets together: Not on file     Attends [employer] service: Not on file     Active member of club or organization: Not on file     Attends meetings of clubs or organizations: Not on file     Relationship status: Not on file    Intimate partner violence     Fear of current or ex partner: Not on file     Emotionally abused: Not on file Physically abused: Not on file     Forced sexual activity: Not on file   Other Topics Concern    Not on file   Social History Narrative    Not on file         OBJECTIVE:  Physical Exam   Ht 5' 3\" (1.6 m)   Wt 228 lb (103.4 kg)   LMP 10/18/2018 (Approximate)   BMI 40.39 kg/m²        NUTRITION DIAGNOSIS: Overweight / Obesity   Problem: Increased adiposity compared to reference standard or established norms   Etiology: Excess intake compared to output over time   S/S: Ht: 63\" Wt: 228 lbs BMI: 40.39    NUTRITION INTERVENTIONS:    Individualized treatment goals to address nutritiondiagnosis:   Instructed on 1200 kcal diet for weight loss   Provided pt handbook, food lists, and goal card Encouraged Physical activity as approved by physician    MONITORING/ EVALUATION/ PLAN:   Pt verbalized understanding of allmaterials covered   Pt asked pertinent questions throughout the session - expect compliance with nutrition guidelines presented   Provided pt with contact information should questions arise prior to next visit   Will f/u with pt in 2-3 months for further education   Hedy South MS, RDN, LD  5/3/2021

## 2021-05-07 ENCOUNTER — PROCEDURE VISIT (OUTPATIENT)
Dept: CARDIOLOGY CLINIC | Age: 26
End: 2021-05-07
Payer: MEDICAID

## 2021-05-07 DIAGNOSIS — Z01.818 PREOPERATIVE CLEARANCE: ICD-10-CM

## 2021-05-07 LAB
LV EF: 67 %
LVEF MODALITY: NORMAL

## 2021-05-07 PROCEDURE — 93016 CV STRESS TEST SUPVJ ONLY: CPT | Performed by: INTERNAL MEDICINE

## 2021-05-07 PROCEDURE — 78452 HT MUSCLE IMAGE SPECT MULT: CPT | Performed by: INTERNAL MEDICINE

## 2021-05-07 PROCEDURE — 93017 CV STRESS TEST TRACING ONLY: CPT | Performed by: INTERNAL MEDICINE

## 2021-05-07 PROCEDURE — A9500 TC99M SESTAMIBI: HCPCS | Performed by: INTERNAL MEDICINE

## 2021-05-07 PROCEDURE — 93018 CV STRESS TEST I&R ONLY: CPT | Performed by: INTERNAL MEDICINE

## 2021-05-10 ENCOUNTER — TELEPHONE (OUTPATIENT)
Dept: CARDIOLOGY CLINIC | Age: 26
End: 2021-05-10

## 2021-05-10 NOTE — TELEPHONE ENCOUNTER
Conclusions       [facility] physician Dr. Art Zapata .    Normal LV function. normal EDV    There is normal isotope uptake following exercise and at rest. There is no    evidence of exercise induced ischemia.    This is a normal study.        Recommendation    Recommendation: Routine follow-up. Spoke to patient regarding results of stress test. Pt voiced understanding.

## 2021-05-13 ENCOUNTER — OFFICE VISIT (OUTPATIENT)
Dept: BARIATRICS/WEIGHT MGMT | Age: 26
End: 2021-05-13
Payer: MEDICAID

## 2021-05-13 VITALS
SYSTOLIC BLOOD PRESSURE: 110 MMHG | WEIGHT: 222.6 LBS | DIASTOLIC BLOOD PRESSURE: 70 MMHG | BODY MASS INDEX: 39.44 KG/M2 | OXYGEN SATURATION: 99 % | HEIGHT: 63 IN | TEMPERATURE: 97.8 F | HEART RATE: 82 BPM

## 2021-05-13 DIAGNOSIS — F32.0 CURRENT MILD EPISODE OF MAJOR DEPRESSIVE DISORDER WITHOUT PRIOR EPISODE (HCC): ICD-10-CM

## 2021-05-13 DIAGNOSIS — E66.01 MORBID OBESITY DUE TO EXCESS CALORIES (HCC): ICD-10-CM

## 2021-05-13 DIAGNOSIS — Z01.818 PRE-OP TESTING: Primary | ICD-10-CM

## 2021-05-13 PROCEDURE — G8417 CALC BMI ABV UP PARAM F/U: HCPCS | Performed by: NURSE PRACTITIONER

## 2021-05-13 PROCEDURE — 1036F TOBACCO NON-USER: CPT | Performed by: NURSE PRACTITIONER

## 2021-05-13 PROCEDURE — G8427 DOCREV CUR MEDS BY ELIG CLIN: HCPCS | Performed by: NURSE PRACTITIONER

## 2021-05-13 PROCEDURE — 99213 OFFICE O/P EST LOW 20 MIN: CPT | Performed by: NURSE PRACTITIONER

## 2021-05-13 ASSESSMENT — ENCOUNTER SYMPTOMS
DIARRHEA: 0
CHEST TIGHTNESS: 0
SORE THROAT: 0
SHORTNESS OF BREATH: 0
APNEA: 0
WHEEZING: 0
BACK PAIN: 0
NAUSEA: 0
COLOR CHANGE: 0
PHOTOPHOBIA: 0

## 2021-05-13 NOTE — PROGRESS NOTES
BARIATRIC SURGERY OFFICE PROGRESS NOTE    SUBJECTIVE:    Patient presenting today referred from MIREYA Cool CNP, for   Chief Complaint   Patient presents with    Follow-up     3rd WM Visit Pulm and Cardio sent fee PIF needs B12 Folate, Zinc and Vit D lab ordered   . Vitals:    05/13/21 1521   BP: 110/70   Pulse: 82   Temp: 97.8 °F (36.6 °C)   SpO2: 99%        BMI: Body mass index is 39.12 kg/m². Weight History: Wt Readings from Last 3 Encounters:   05/13/21 222 lb 9.6 oz (101 kg)   05/03/21 228 lb (103.4 kg)   04/29/21 228 lb (103.4 kg)         Bettye Bond is a 27 y.o. female presenting in third bariatric PRE-OP visit    Diet Recall: Total weight loss/gain: -9.1 lbs since last visit, and -28.9 lbs since starting program     Protein: eats protein with each meal  Water Intake: all shes drinks. occasionally drink zero calorie tea  Exercise: stays active. Does go to the gym. Walking a lot  New health problems: denies  New medications: denies  Clearances: cardiology in process. Stress test completed. ECHO on 5/21. Pulm appt on 6/3    On ATB for H. Pylori. Therapy completed today    Thoroughly reviewed the patient's medical history, family history, social history and review of systems with the patient today in the office. Please see medical record for pertinent positives.       Past Medical History:   Diagnosis Date    37 weeks gestation of pregnancy 06/28/2016    Anemia     History of postpartum hemorrhage, currently pregnant in third trimester 06/28/2016    Hx of cardiovascular stress test 05/07/2021    Normal study    Migraines     Preeclampsia 2014    Sciatica         Patient Active Problem List   Diagnosis    Diarrhea    Non morbid obesity due to excess calories    Migraine without aura and without status migrainosus, not intractable    Depression    Muscle spasm of back    Anxiety    Latex allergy    S/P laparoscopic cholecystectomy    Morbid obesity with BMI of 40.0-44.9, adult ([facility] [address]    Esophageal reflux       Past Surgical History:   Procedure Laterality Date    CHOLECYSTECTOMY  12/13/2018    DENTAL SURGERY      wisdom teeth    HYSTERECTOMY      SALPINGECTOMY      UPPER GASTROINTESTINAL ENDOSCOPY N/A 4/12/2021    EGD BIOPSY performed by Brittney Conklin MD at [facility] ENDOSCOPY       Current Outpatient Medications   Medication Sig Dispense Refill    amoxicillin-clarithromycin-lansoprazole (PREVPAC) combo pack Take by mouth 2 times daily 28 each 3    Multiple Vitamins-Minerals (THERAPEUTIC MULTIVITAMIN-MINERALS) tablet Take 1 tablet by mouth daily      topiramate (TOPAMAX) 25 MG tablet Take 25 mg by mouth 2 times daily      buPROPion (WELLBUTRIN) 75 MG tablet Take 100 mg by mouth daily       cholestyramine (QUESTRAN) 4 g packet Take 1 packet by mouth 3 times daily 90 packet 3    sertraline (ZOLOFT) 50 MG tablet Take 1 tablet by mouth daily 30 tablet 5    busPIRone (BUSPAR) 10 MG tablet TAKE ONE-HALF TABLET BY MOUTH THREE TIMES A DAY AS NEEDED FOR ANXIETY (Patient taking differently: Take 15 mg by mouth 3 times daily TAKE ONE TABLET BY MOUTH THREE TIMES A DAY AS NEEDED FOR ANXIETY) 30 tablet 5    EPINEPHrine (EPIPEN 2-CLEOPATRA) 0.3 MG/0.3ML SOAJ injection Use as directed for allergic reaction 2 each 0     No current facility-administered medications for this visit. Allergies   Allergen Reactions    Latex Hives and Rash    Macrobid [Nitrofurantoin Macrocrystal] Hives     No SOB or closing of throat noted when pt had reaction      Norco [Hydrocodone-Acetaminophen]      Makes her really sick to her stomach; hard to wake up with,it keeps her asleep    Other      Prolene suture    Ropivacaine     Tape Gordon Lemmings Tape] Rash         Review of Systems   Constitutional: Negative for fatigue and fever. HENT: Negative for congestion, dental problem and sore throat. Eyes: Negative for photophobia and visual disturbance.    Respiratory: Negative for apnea, chest tightness, shortness of breath and wheezing. Cardiovascular: Negative for chest pain and leg swelling. Gastrointestinal: Negative for diarrhea and nausea. Endocrine: Negative for cold intolerance and heat intolerance. Genitourinary: Negative for difficulty urinating, dysuria, flank pain, frequency and hematuria. Musculoskeletal: Negative for arthralgias and back pain. Skin: Negative for color change, rash and wound. Allergic/Immunologic: Negative for environmental allergies, food allergies and immunocompromised state. Neurological: Negative for dizziness, weakness, light-headedness and numbness. Hematological: Negative for adenopathy. Does not bruise/bleed easily. Psychiatric/Behavioral: Negative for behavioral problems, confusion, sleep disturbance and suicidal ideas. OBJECTIVE:    /70   Pulse 82   Temp 97.8 °F (36.6 °C)   Ht 5' 3.25\" (1.607 m)   Wt 222 lb 9.6 oz (101 kg)   LMP 10/18/2018 (Approximate)   SpO2 99%   BMI 39.12 kg/m²      Physical Exam  Vitals signs reviewed. Constitutional:       Appearance: She is obese. HENT:      Head: Normocephalic and atraumatic. Right Ear: External ear normal.      Left Ear: External ear normal.      Nose: Nose normal.      Mouth/Throat:      Mouth: Mucous membranes are moist.   Eyes:      Extraocular Movements: Extraocular movements intact. Pupils: Pupils are equal, round, and reactive to light. Neck:      Musculoskeletal: Normal range of motion and neck supple. Cardiovascular:      Rate and Rhythm: Normal rate and regular rhythm. Pulses: Normal pulses. Heart sounds: Normal heart sounds. Pulmonary:      Effort: Pulmonary effort is normal.      Breath sounds: Normal breath sounds. Abdominal:      General: Bowel sounds are normal.   Musculoskeletal: Normal range of motion. Skin:     General: Skin is warm and dry. Neurological:      General: No focal deficit present.       Mental Status: She is alert and oriented to person, place, and time. Mental status is at baseline. Psychiatric:         Mood and Affect: Mood normal.         Behavior: Behavior normal.         ASSESSMENT & PLAN:    1. Pre-op testing  -Continue clearances  -H. Pylori positive on EGD. Completed ATB therapy today  - Amb External Referral To Psychology    2. Morbid obesity due to excess calories [facility])  -Patient was encouraged to journal all food intake.   -Keep calorie level at approximately 1200, per discussion / plan with registered dietician.  -Protein intake is to be a minimum of 60 grams per day. -Water drinking was encouraged with a goal of 64oz-128oz daily. Beverages are to be calorie free except for milk. Avoid soda. -Continue to increase level of physical activity. 3. Current mild episode of major depressive disorder without prior episode ([address]  - Stable. - On wellbutrin  -PCP managing    I spent 25 minutes with the patient face to face today and over 50% of the office visit today was spent in face to face counseling regarding diet and exercise, in preparation for her planned Robotic Sleeve Gastrectomy. Discussed in length complying with the dietary recommendations, complying with the preoperative workup including dietary counseling completed, exercise physiologist counseling completed, and pre-operative optimization of pulmonologist - appointment on 6/3 and cardiologist in process. The patient expressed understanding and willingness to comply nicely; all questions and concerns addressed. No orders of the defined types were placed in this encounter. Orders Placed This Encounter   Procedures    Amb External Referral To Psychology     Referral Priority:   Routine     Referral Reason:   Specialty Services Required     Referred to Provider:   Earle Leong     Requested Specialty:   Psychology     Number of Visits Requested:   1       Follow Up:  Return in about 1 month (around 6/13/2021) for weight check.     Taisha Philippe

## 2021-05-21 ENCOUNTER — PROCEDURE VISIT (OUTPATIENT)
Dept: CARDIOLOGY CLINIC | Age: 26
End: 2021-05-21
Payer: MEDICAID

## 2021-05-21 DIAGNOSIS — Z01.818 PREOPERATIVE CLEARANCE: Primary | ICD-10-CM

## 2021-05-21 LAB
LV EF: 58 %
LVEF MODALITY: NORMAL

## 2021-05-21 PROCEDURE — 93306 TTE W/DOPPLER COMPLETE: CPT | Performed by: INTERNAL MEDICINE

## 2021-05-24 ENCOUNTER — TELEPHONE (OUTPATIENT)
Dept: CARDIOLOGY CLINIC | Age: 26
End: 2021-05-24

## 2021-06-07 ENCOUNTER — HOSPITAL ENCOUNTER (OUTPATIENT)
Dept: GENERAL RADIOLOGY | Age: 26
Discharge: HOME | End: 2021-06-07
Payer: MEDICAID

## 2021-06-07 ENCOUNTER — HOSPITAL ENCOUNTER (OUTPATIENT)
Age: 26
Discharge: HOME | End: 2021-06-07
Payer: MEDICAID

## 2021-06-07 DIAGNOSIS — G47.10 HYPERSOMNOLENCE: ICD-10-CM

## 2021-06-07 PROCEDURE — 71046 X-RAY EXAM CHEST 2 VIEWS: CPT

## 2021-06-16 ENCOUNTER — OFFICE VISIT (OUTPATIENT)
Dept: BARIATRICS/WEIGHT MGMT | Age: 26
End: 2021-06-16
Payer: MEDICAID

## 2021-06-16 VITALS
SYSTOLIC BLOOD PRESSURE: 102 MMHG | DIASTOLIC BLOOD PRESSURE: 76 MMHG | TEMPERATURE: 97 F | BODY MASS INDEX: 37.74 KG/M2 | WEIGHT: 213 LBS | HEIGHT: 63 IN | HEART RATE: 79 BPM | OXYGEN SATURATION: 97 %

## 2021-06-16 DIAGNOSIS — E66.01 MORBID OBESITY DUE TO EXCESS CALORIES (HCC): Primary | ICD-10-CM

## 2021-06-16 PROCEDURE — G8427 DOCREV CUR MEDS BY ELIG CLIN: HCPCS | Performed by: SURGERY

## 2021-06-16 PROCEDURE — G8417 CALC BMI ABV UP PARAM F/U: HCPCS | Performed by: SURGERY

## 2021-06-16 PROCEDURE — 99213 OFFICE O/P EST LOW 20 MIN: CPT | Performed by: SURGERY

## 2021-06-16 PROCEDURE — 1036F TOBACCO NON-USER: CPT | Performed by: SURGERY

## 2021-06-16 ASSESSMENT — ENCOUNTER SYMPTOMS
ANAL BLEEDING: 0
VOICE CHANGE: 0
CONSTIPATION: 0
WHEEZING: 0
BLOOD IN STOOL: 0
SORE THROAT: 0
COUGH: 0
ABDOMINAL PAIN: 0
NAUSEA: 0
TROUBLE SWALLOWING: 0
DIARRHEA: 0
PHOTOPHOBIA: 0
COLOR CHANGE: 0
SHORTNESS OF BREATH: 0
VOMITING: 0

## 2021-06-16 NOTE — PROGRESS NOTES
BARIATRIC SURGERY OFFICE NOTE    SUBJECTIVE:    Patient presenting today originally referred from MIREYA Guzman CNP, for   Chief Complaint   Patient presents with    Follow-up     f/u 4th wm surg cardiac clearance complete, pulm appt 6/3   . HPI: Dixie Mak is a 27 y.o. female being seen for follow up for bariatric weight loss surgery. Estefani'slast month weight gain/loss was - 9.6 Lbs, and total 38.5 Lbs. Addressed the status of the following co-morbidities:   1. Migraines improving wake up with them everyday. .  2. Arthralgia  improving. 3. DEB  Awaiting pulm. Thoroughly reviewed the patient's medical history, family history, social history and review of systems with the patient today in theoffice. Please see medical record for pertinent positives.       Past Medical History:   Diagnosis Date    37 weeks gestation of pregnancy 06/28/2016    Anemia     H/O echocardiogram 05/21/2021    EF 55-60% no evidence of pericardial effusion no significant valvular regurgitation    History of postpartum hemorrhage, currently pregnant in third trimester 06/28/2016    Hx of cardiovascular stress test 05/07/2021    Normal study    Migraines     Preeclampsia 2014    Sciatica       Past Surgical History:   Procedure Laterality Date    CHOLECYSTECTOMY  12/13/2018    DENTAL SURGERY      wisdom teeth    HYSTERECTOMY      SALPINGECTOMY      UPPER GASTROINTESTINAL ENDOSCOPY N/A 4/12/2021    EGD BIOPSY performed by Madan Stark MD at [facility] ENDOSCOPY     Current Outpatient Medications   Medication Sig Dispense Refill    buPROPion (WELLBUTRIN SR) 100 MG extended release tablet       busPIRone (BUSPAR) 15 MG tablet       topiramate (TOPAMAX) 50 MG tablet       Multiple Vitamins-Minerals (THERAPEUTIC MULTIVITAMIN-MINERALS) tablet Take 1 tablet by mouth daily      cholestyramine (QUESTRAN) 4 g packet Take 1 packet by mouth 3 times daily 90 packet 3    sertraline (ZOLOFT) 50 MG tablet Take normal.      Pupils: Pupils are equal, round, and reactive to light. Neck:      Thyroid: No thyromegaly. Vascular: No JVD. Trachea: No tracheal deviation. Cardiovascular:      Rate and Rhythm: Normal rate and regular rhythm. Heart sounds: Normal heart sounds. No murmur heard. No friction rub. No gallop. Pulmonary:      Effort: Pulmonary effort is normal. No respiratory distress. Breath sounds: No stridor. No wheezing or rales. Chest:      Chest wall: No tenderness. Abdominal:      General: Bowel sounds are normal. There is no distension. Palpations: Abdomen is soft. There is no mass. Tenderness: There is no abdominal tenderness. There is no guarding or rebound. Musculoskeletal:         General: No tenderness. Normal range of motion. Cervical back: Normal range of motion. Lymphadenopathy:      Cervical: No cervical adenopathy. Skin:     General: Skin is warm and dry. Coloration: Skin is not pale. Findings: No erythema or rash. Neurological:      Mental Status: She is alert and oriented to person, place, and time. Cranial Nerves: No cranial nerve deficit. Coordination: Coordination normal.   Psychiatric:         Behavior: Behavior normal.         Thought Content: Thought content normal.         Judgment: Judgment normal.                 ASSESSMENT & PLAN:    1. Morbid obesity due to excess calories ([address]         Cleared Card - Psych, and awaiting Tyrell Loud was treated with 2 wks abx. Patient was encouraged to journal all food intake. Keep calorie level atapproximately 9908-0860. Protein intake is to be a minimum of 60-80 grams per day. Water drinking was encouraged with a goal of 64oz-128oz daily. Beverages to be calorie free except for milk. Every other beverage should bewater. They are to avoid soda. Continue to increase level of physical activity.       I counseled the patient regarding diet and exercise, in preparation for her planned Robotic Sleeve Gastrectomy. Counting calories, complying with the dietitian's recommendations, and complying with the preoperative workup including the dietitian counseling, exercise physiologist counseling,cardiologist evaluation and pre-operative optimization, pulmonologist evaluation and pre operative optimization, pre operative EGD evaluation. The patient expressed understanding and willingness to comply nicely; allquestions and concerns addressed in details. Patient counseled on the risks, benefits, and alternatives oftreatment plan at length while in the office today. Patient states an understanding and willingness to proceed with the plan. Follow Up:  Return in about 4 weeks (around 7/14/2021) for For imaging and tests results review, Bariatric follow up: diet, exercise & weight loss.       Ankit Beard MD, FACS, FICS  Member of the [employer] and [employer]    [phone]    6/16/21

## 2021-07-05 ENCOUNTER — HOSPITAL ENCOUNTER (OUTPATIENT)
Dept: SLEEP CENTER | Age: 26
Discharge: HOME | End: 2021-07-05
Payer: MEDICAID

## 2021-07-05 DIAGNOSIS — G47.10 HYPERSOMNOLENCE: ICD-10-CM

## 2021-07-05 PROCEDURE — 95810 POLYSOM 6/> YRS 4/> PARAM: CPT

## 2021-07-05 ASSESSMENT — SLEEP AND FATIGUE QUESTIONNAIRES
HOW LIKELY ARE YOU TO NOD OFF OR FALL ASLEEP WHILE LYING DOWN TO REST IN THE AFTERNOON WHEN CIRCUMSTANCES PERMIT: 2
HOW LIKELY ARE YOU TO NOD OFF OR FALL ASLEEP WHILE SITTING INACTIVE IN A PUBLIC PLACE: 0
HOW LIKELY ARE YOU TO NOD OFF OR FALL ASLEEP WHILE SITTING AND READING: 0
NECK CIRCUMFERENCE (INCHES): 14.5
HOW LIKELY ARE YOU TO NOD OFF OR FALL ASLEEP WHILE WATCHING TV: 0
ESS TOTAL SCORE: 3
HOW LIKELY ARE YOU TO NOD OFF OR FALL ASLEEP WHEN YOU ARE A PASSENGER IN A CAR FOR AN HOUR WITHOUT A BREAK: 1
HOW LIKELY ARE YOU TO NOD OFF OR FALL ASLEEP WHILE SITTING AND TALKING TO SOMEONE: 0
HOW LIKELY ARE YOU TO NOD OFF OR FALL ASLEEP WHILE SITTING QUIETLY AFTER LUNCH WITHOUT ALCOHOL: 0
HOW LIKELY ARE YOU TO NOD OFF OR FALL ASLEEP IN A CAR, WHILE STOPPED FOR A FEW MINUTES IN TRAFFIC: 0

## 2021-07-06 NOTE — PROGRESS NOTES
7/6/2021  sleep study  for Joni Seip  1995 is complete. Results are pending physician review.     Electronically signed by Jeffry Severs, RCP on 7/6/2021 at 6:19 AM

## 2021-07-09 LAB — STATUS: NORMAL

## 2021-07-28 ENCOUNTER — OFFICE VISIT (OUTPATIENT)
Dept: BARIATRICS/WEIGHT MGMT | Age: 26
End: 2021-07-28
Payer: MEDICAID

## 2021-07-28 VITALS
DIASTOLIC BLOOD PRESSURE: 70 MMHG | WEIGHT: 211.5 LBS | OXYGEN SATURATION: 100 % | HEIGHT: 63 IN | SYSTOLIC BLOOD PRESSURE: 110 MMHG | BODY MASS INDEX: 37.47 KG/M2 | HEART RATE: 76 BPM

## 2021-07-28 DIAGNOSIS — E66.09 NON MORBID OBESITY DUE TO EXCESS CALORIES: Primary | ICD-10-CM

## 2021-07-28 PROCEDURE — G8417 CALC BMI ABV UP PARAM F/U: HCPCS | Performed by: SURGERY

## 2021-07-28 PROCEDURE — G8427 DOCREV CUR MEDS BY ELIG CLIN: HCPCS | Performed by: SURGERY

## 2021-07-28 PROCEDURE — 1036F TOBACCO NON-USER: CPT | Performed by: SURGERY

## 2021-07-28 PROCEDURE — 99213 OFFICE O/P EST LOW 20 MIN: CPT | Performed by: SURGERY

## 2021-07-28 ASSESSMENT — ENCOUNTER SYMPTOMS
DIARRHEA: 0
BLOOD IN STOOL: 0
SORE THROAT: 0
PHOTOPHOBIA: 0
NAUSEA: 0
COLOR CHANGE: 0
VOICE CHANGE: 0
ANAL BLEEDING: 0
SHORTNESS OF BREATH: 0
ABDOMINAL PAIN: 0
CONSTIPATION: 0
TROUBLE SWALLOWING: 0
VOMITING: 0
COUGH: 0
WHEEZING: 0

## 2021-07-28 NOTE — PROGRESS NOTES
BARIATRIC SURGERY OFFICE NOTE    SUBJECTIVE:    Patient presenting today originally referred from MIREYA Major CNP, for   Chief Complaint   Patient presents with    Weight Management     5th surg wm visit   . HPI: Mary Fernandez is a 27 y.o. female being seen for follow up for bariatric weight loss surgery. Estefani'slast month weight gain/loss was -1.5 Lbs (-40 Lbs total!!)  . Cardiology and pulm cleared her for surgery, and psych cleared her and the EGD showed + ve Hpylori, and completed 2 wks af abx, Final Pathologic Diagnosis:   Stomach, antrum and body, biopsy:   -  Chronic gastritis (see comment, see stains report). -  Helicobacter pylori microorganisms are present.       Electronically Signed Out By Roger Manriquez. Gio weinstein MD    Thoroughly reviewed the patient's medical history, family history, social history and review of systems with the patient today in theoffice. Please see medical record for pertinent positives.       Past Medical History:   Diagnosis Date    37 weeks gestation of pregnancy 06/28/2016    Anemia     H/O echocardiogram 05/21/2021    EF 55-60% no evidence of pericardial effusion no significant valvular regurgitation    History of postpartum hemorrhage, currently pregnant in third trimester 06/28/2016    Hx of cardiovascular stress test 05/07/2021    Normal study    Migraines     Preeclampsia 2014    Sciatica       Past Surgical History:   Procedure Laterality Date    CHOLECYSTECTOMY  12/13/2018    DENTAL SURGERY      wisdom teeth    HYSTERECTOMY      SALPINGECTOMY      UPPER GASTROINTESTINAL ENDOSCOPY N/A 4/12/2021    EGD BIOPSY performed by Sara Jade MD at [facility] ENDOSCOPY     Current Outpatient Medications   Medication Sig Dispense Refill    melatonin 3 MG TABS tablet       propranolol (INDERAL) 20 MG tablet       buPROPion (WELLBUTRIN SR) 100 MG extended release tablet       busPIRone (BUSPAR) 15 MG tablet       Multiple Vitamins-Minerals (THERAPEUTIC MULTIVITAMIN-MINERALS) tablet Take 1 tablet by mouth daily      cholestyramine (QUESTRAN) 4 g packet Take 1 packet by mouth 3 times daily 90 packet 3    sertraline (ZOLOFT) 50 MG tablet Take 1 tablet by mouth daily 30 tablet 5    EPINEPHrine (EPIPEN 2-CLEOPATRA) 0.3 MG/0.3ML SOAJ injection Use as directed for allergic reaction 2 each 0     No current facility-administered medications for this visit. Allergies   Allergen Reactions    Latex Hives and Rash    Macrobid [Nitrofurantoin Macrocrystal] Hives     No SOB or closing of throat noted when pt had reaction      Norco [Hydrocodone-Acetaminophen]      Makes her really sick to her stomach; hard to wake up with,it keeps her asleep    Other      Prolene suture    Ropivacaine     Tape Keila Schilder Tape] Rash           Review of Systems   Constitutional: Negative for activity change, chills, diaphoresis and fever. HENT: Negative for sore throat, trouble swallowing and voice change. Eyes: Negative for photophobia and visual disturbance. Respiratory: Negative for cough, shortness of breath and wheezing. Cardiovascular: Negative for chest pain, palpitations and leg swelling. Gastrointestinal: Negative for abdominal pain, anal bleeding, blood in stool, constipation, diarrhea, nausea and vomiting. Endocrine: Negative for cold intolerance, heat intolerance, polydipsia and polyuria. Genitourinary: Negative for dysuria, frequency and hematuria. Musculoskeletal: Negative for joint swelling, myalgias and neck stiffness. Skin: Negative for color change and rash. Neurological: Negative for seizures, speech difficulty, light-headedness and numbness. Hematological: Negative for adenopathy. Does not bruise/bleed easily. OBJECTIVE:    Vitals:    07/28/21 1310   BP: 110/70   Pulse: 76   SpO2: 100%     Body mass index is 37.17 kg/m². Physical Exam  Vitals reviewed. Constitutional:       General: She is not in acute distress. other beverage should bewater. They are to avoid soda. Continue to increase level of physical activity. I counseled the patient regarding diet and exercise, in preparation for her planned Robotic Sleeve Gastrectomy. Counting calories, complying with the dietitian's recommendations, and complying with the preoperative workup including the dietitian counseling, exercise physiologist counseling,cardiologist evaluation and pre-operative optimization, pulmonologist evaluation and pre operative optimization, pre operative EGD evaluation. The patient expressed understanding and willingness to comply nicely; allquestions and concerns addressed in details. Patient counseled on the risks, benefits, and alternatives oftreatment plan at length while in the office today. Patient states an understanding and willingness to proceed with the plan. Follow Up:  Return in about 4 weeks (around 8/25/2021) for Bariatric follow up: diet, exercise & weight loss, Follow up Symptoms.       Jesus Manuel Moncada MD, FACS, FICS  Member of the [employer] and [employer]    [phone]    7/28/21

## 2021-08-03 ENCOUNTER — OFFICE VISIT (OUTPATIENT)
Dept: OBGYN | Age: 26
End: 2021-08-03
Payer: MEDICAID

## 2021-08-03 VITALS
DIASTOLIC BLOOD PRESSURE: 72 MMHG | HEIGHT: 63 IN | WEIGHT: 213 LBS | SYSTOLIC BLOOD PRESSURE: 103 MMHG | BODY MASS INDEX: 37.74 KG/M2

## 2021-08-03 DIAGNOSIS — Z80.41 FAMILY HISTORY OF OVARIAN CANCER: ICD-10-CM

## 2021-08-03 DIAGNOSIS — B37.89 CANDIDA RASH OF GROIN: ICD-10-CM

## 2021-08-03 DIAGNOSIS — Z01.419 WOMEN'S ANNUAL ROUTINE GYNECOLOGICAL EXAMINATION: Primary | ICD-10-CM

## 2021-08-03 PROCEDURE — 99395 PREV VISIT EST AGE 18-39: CPT | Performed by: NURSE PRACTITIONER

## 2021-08-03 RX ORDER — NYSTATIN 100000 U/G
OINTMENT TOPICAL
Qty: 1 TUBE | Refills: 0 | Status: SHIPPED | OUTPATIENT
Start: 2021-08-03 | End: 2022-02-08 | Stop reason: ALTCHOICE

## 2021-08-03 SDOH — ECONOMIC STABILITY: FOOD INSECURITY: WITHIN THE PAST 12 MONTHS, YOU WORRIED THAT YOUR FOOD WOULD RUN OUT BEFORE YOU GOT MONEY TO BUY MORE.: NEVER TRUE

## 2021-08-03 SDOH — ECONOMIC STABILITY: FOOD INSECURITY: WITHIN THE PAST 12 MONTHS, THE FOOD YOU BOUGHT JUST DIDN'T LAST AND YOU DIDN'T HAVE MONEY TO GET MORE.: NEVER TRUE

## 2021-08-03 ASSESSMENT — ENCOUNTER SYMPTOMS
GASTROINTESTINAL NEGATIVE: 1
RESPIRATORY NEGATIVE: 1

## 2021-08-03 ASSESSMENT — SOCIAL DETERMINANTS OF HEALTH (SDOH): HOW HARD IS IT FOR YOU TO PAY FOR THE VERY BASICS LIKE FOOD, HOUSING, MEDICAL CARE, AND HEATING?: NOT HARD AT ALL

## 2021-08-03 NOTE — PROGRESS NOTES
General: Normal vulva. Pubic Area: Rash present. Vagina: Normal.      Adnexa: Right adnexa normal and left adnexa normal.      Rectum: Normal.       Musculoskeletal:         General: Normal range of motion. Cervical back: Normal range of motion. Skin:     General: Skin is warm and dry. Neurological:      Mental Status: She is alert. Psychiatric:         Mood and Affect: Mood normal.         Behavior: Behavior normal.         No results found for this visit on 08/03/21. Assessment and Plan  1. Women's annual routine gynecological examination      2. Candida rash of groin  Avoid restrictive clothing. Rx Nystatin ointment. 3. Family history of ovarian cancer  Multiple family members with stomach cancer, father diagnosed with Thyroid cancer. Info given on genetic testing. No follow-ups on file.     MIREYA Quintero - CNP

## 2021-08-18 ENCOUNTER — HOSPITAL ENCOUNTER (OUTPATIENT)
Age: 26
Discharge: HOME | End: 2021-08-18
Payer: MEDICAID

## 2021-08-18 ENCOUNTER — OFFICE VISIT (OUTPATIENT)
Dept: BARIATRICS/WEIGHT MGMT | Age: 26
End: 2021-08-18
Payer: MEDICAID

## 2021-08-18 VITALS
TEMPERATURE: 98.5 F | HEART RATE: 64 BPM | SYSTOLIC BLOOD PRESSURE: 98 MMHG | WEIGHT: 211.6 LBS | RESPIRATION RATE: 18 BRPM | HEIGHT: 63 IN | DIASTOLIC BLOOD PRESSURE: 66 MMHG | BODY MASS INDEX: 37.49 KG/M2

## 2021-08-18 DIAGNOSIS — E66.09 NON MORBID OBESITY DUE TO EXCESS CALORIES: Primary | ICD-10-CM

## 2021-08-18 DIAGNOSIS — Z91.040 LATEX ALLERGY: ICD-10-CM

## 2021-08-18 LAB
AMPHETAMINES: NEGATIVE
BARBITURATE SCREEN URINE: NEGATIVE
BENZODIAZEPINE SCREEN, URINE: NEGATIVE
CANNABINOID SCREEN URINE: NEGATIVE
COCAINE METABOLITE: NEGATIVE
OPIATES, URINE: NEGATIVE
OXYCODONE: NEGATIVE
PHENCYCLIDINE, URINE: NEGATIVE

## 2021-08-18 PROCEDURE — 99214 OFFICE O/P EST MOD 30 MIN: CPT | Performed by: SURGERY

## 2021-08-18 PROCEDURE — 36415 COLL VENOUS BLD VENIPUNCTURE: CPT

## 2021-08-18 PROCEDURE — 80307 DRUG TEST PRSMV CHEM ANLYZR: CPT

## 2021-08-18 RX ORDER — NYSTATIN 100000 [USP'U]/G
POWDER TOPICAL
Qty: 1 BOTTLE | Refills: 5 | Status: SHIPPED | OUTPATIENT
Start: 2021-08-18

## 2021-08-18 RX ORDER — PSYLLIUM SEED (WITH DEXTROSE)
1 POWDER (GRAM) ORAL DAILY
Qty: 1 BOTTLE | Refills: 5 | Status: SHIPPED | OUTPATIENT
Start: 2021-08-18 | End: 2021-09-30 | Stop reason: ALTCHOICE

## 2021-08-18 RX ORDER — EPINEPHRINE 0.3 MG/.3ML
INJECTION SUBCUTANEOUS
Qty: 2 EACH | Refills: 0 | Status: SHIPPED | OUTPATIENT
Start: 2021-08-18

## 2021-08-18 ASSESSMENT — ENCOUNTER SYMPTOMS
ABDOMINAL DISTENTION: 1
VOICE CHANGE: 0
SHORTNESS OF BREATH: 1
DIARRHEA: 0
COLOR CHANGE: 0
BLOOD IN STOOL: 0
CONSTIPATION: 0
ABDOMINAL PAIN: 1
COUGH: 0
ANAL BLEEDING: 0
NAUSEA: 0
VOMITING: 0
SORE THROAT: 0
PHOTOPHOBIA: 0
WHEEZING: 0
TROUBLE SWALLOWING: 0
BACK PAIN: 1

## 2021-08-18 NOTE — PROGRESS NOTES
BARIATRIC SURGERY OFFICE NOTE    SUBJECTIVE:    Patient presenting today originally referred from MIREYA Alejo CNP, for   Chief Complaint   Patient presents with    Follow-up     Here for 6th WM visit. Has gained 0.1oz since last visit    Constipation     Patient has been constipated for about a week. Has tried ducolax and miralax with no relief. Dora Gibbs HPI: Donn Parmar is a 27 y.o. female being seen for follow up for bariatric weight loss surgery. Estefani'slast month weight gain/loss was +0.1 Lbs, but 39.9 Lbs total!.Consented, for sleeve gastrectomy. Thoroughly reviewed the patient's medical history, family history, social history and review of systems with the patient today in theoffice. Please see medical record for pertinent positives. Past Medical History:   Diagnosis Date    37 weeks gestation of pregnancy 06/28/2016    Anemia     H/O echocardiogram 05/21/2021    EF 55-60% no evidence of pericardial effusion no significant valvular regurgitation    History of postpartum hemorrhage, currently pregnant in third trimester 06/28/2016    Hx of cardiovascular stress test 05/07/2021    Normal study    Migraines     Preeclampsia 2014    Sciatica       Past Surgical History:   Procedure Laterality Date    CHOLECYSTECTOMY  12/13/2018    DENTAL SURGERY      wisdom teeth    HYSTERECTOMY      SALPINGECTOMY      UPPER GASTROINTESTINAL ENDOSCOPY N/A 4/12/2021    EGD BIOPSY performed by Odessa Srinivasan MD at [facility] ENDOSCOPY     Current Outpatient Medications   Medication Sig Dispense Refill    EPINEPHrine (EPIPEN 2-CLEOPATRA) 0.3 MG/0.3ML SOAJ injection Use as directed for allergic reaction 2 each 0    nystatin (MYCOSTATIN) 684086 UNIT/GM powder Apply 3 times daily. 1 Bottle 5    Psyllium (METAMUCIL) 48.57 % POWD Take 1 Dose by mouth daily 1 Big tablespoonful with 8 Oz of water after the largest meal of the day - DAILY.  1 Bottle 5    nystatin (MYCOSTATIN) 378269 UNIT/GM ointment Apply topically 2 times daily. 1 Tube 0    melatonin 3 MG TABS tablet       propranolol (INDERAL) 20 MG tablet       buPROPion (WELLBUTRIN SR) 100 MG extended release tablet       busPIRone (BUSPAR) 15 MG tablet       Multiple Vitamins-Minerals (THERAPEUTIC MULTIVITAMIN-MINERALS) tablet Take 1 tablet by mouth daily      cholestyramine (QUESTRAN) 4 g packet Take 1 packet by mouth 3 times daily 90 packet 3    sertraline (ZOLOFT) 50 MG tablet Take 1 tablet by mouth daily 30 tablet 5     No current facility-administered medications for this visit. Allergies   Allergen Reactions    Latex Hives and Rash    Macrobid [Nitrofurantoin Macrocrystal] Hives     No SOB or closing of throat noted when pt had reaction      Norco [Hydrocodone-Acetaminophen]      Makes her really sick to her stomach; hard to wake up with,it keeps her asleep    Other      Prolene suture    Ropivacaine     Tape Gabriele Ileana Tape] Rash           Review of Systems   Constitutional: Positive for fatigue. Negative for activity change, chills, diaphoresis and fever. HENT: Negative for sore throat, trouble swallowing and voice change. Eyes: Negative for photophobia and visual disturbance. Respiratory: Positive for shortness of breath. Negative for cough and wheezing. Cardiovascular: Positive for leg swelling. Negative for chest pain and palpitations. Gastrointestinal: Positive for abdominal distention and abdominal pain. Negative for anal bleeding, blood in stool, constipation, diarrhea, nausea and vomiting. Endocrine: Positive for polyphagia. Negative for cold intolerance, heat intolerance, polydipsia and polyuria. Genitourinary: Positive for urgency. Negative for dysuria, frequency and hematuria. Musculoskeletal: Positive for arthralgias, back pain and gait problem. Negative for joint swelling, myalgias and neck stiffness. Skin: Negative for color change and rash.    Neurological: Negative for seizures, speech difficulty, light-headedness and numbness. Hematological: Negative for adenopathy. Does not bruise/bleed easily. Psychiatric/Behavioral: Positive for sleep disturbance. The patient is nervous/anxious. OBJECTIVE:    Vitals:    08/18/21 1312   BP: 98/66   Pulse: 64   Resp: 18   Temp: 98.5 °F (36.9 °C)     Body mass index is 37.19 kg/m². Physical Exam  Vitals reviewed. Constitutional:       General: She is not in acute distress. Appearance: She is well-developed. She is not diaphoretic. HENT:      Head: Normocephalic and atraumatic. Eyes:      General: No scleral icterus. Conjunctiva/sclera: Conjunctivae normal.      Pupils: Pupils are equal, round, and reactive to light. Neck:      Thyroid: No thyromegaly. Vascular: No JVD. Trachea: No tracheal deviation. Cardiovascular:      Rate and Rhythm: Normal rate and regular rhythm. Heart sounds: Normal heart sounds. No murmur heard. No friction rub. No gallop. Pulmonary:      Effort: Pulmonary effort is normal. No respiratory distress. Breath sounds: No stridor. No wheezing or rales. Chest:      Chest wall: No tenderness. Abdominal:      General: Bowel sounds are normal. There is no distension. Palpations: Abdomen is soft. There is no mass. Tenderness: There is no abdominal tenderness. There is no guarding or rebound. Musculoskeletal:         General: No tenderness. Normal range of motion. Cervical back: Normal range of motion. Lymphadenopathy:      Cervical: No cervical adenopathy. Skin:     General: Skin is warm and dry. Coloration: Skin is not pale. Findings: No erythema or rash. Neurological:      Mental Status: She is alert and oriented to person, place, and time. Cranial Nerves: No cranial nerve deficit. Coordination: Coordination normal.   Psychiatric:         Behavior: Behavior normal.         Thought Content:  Thought content normal.         Judgment: Judgment normal. ASSESSMENT & PLAN:    1. Non morbid obesity due to excess calories    2. Latex allergy         Lost ~ 40 Lbs over the past 6 months, ready for her Sleeve gastrectomy, I consented her today. Addressed her skin ulcers abdominal pannus issue today with Nystatin Poder, and constipation with Metamucil. Patient was encouraged to journal all food intake. Keep calorie level atapproximately 4708-6628. Protein intake is to be a minimum of 60-80 grams per day. Water drinking was encouraged with a goal of 64oz-128oz daily. Beverages to be calorie free except for milk. Every other beverage should bewater. They are to avoid soda. Continue to increase level of physical activity. I counseled the patient regarding diet and exercise, in preparation for her planned Robotic Sleeve Gastrectomy. Counting calories, complying with the dietitian's recommendations, and complying with the preoperative workup including the dietitian counseling, exercise physiologist counseling,cardiologist evaluation and pre-operative optimization, pulmonologist evaluation and pre operative optimization, pre operative EGD evaluation. The patient expressed understanding and willingness to comply nicely; allquestions and concerns addressed in details. Patient counseled on the risks, benefits, and alternatives oftreatment plan at length while in the office today. Patient states an understanding and willingness to proceed with the plan. Orders Placed This Encounter   Medications    EPINEPHrine (EPIPEN 2-CLEOPATRA) 0.3 MG/0.3ML SOAJ injection     Sig: Use as directed for allergic reaction     Dispense:  2 each     Refill:  0    nystatin (MYCOSTATIN) [id number] UNIT/GM powder     Sig: Apply 3 times daily. Dispense:  1 Bottle     Refill:  5    Psyllium (METAMUCIL) 48.57 % POWD     Sig: Take 1 Dose by mouth daily 1 Big tablespoonful with 8 Oz of water after the largest meal of the day - DAILY.      Dispense:  1 Bottle     Refill:

## 2021-08-23 LAB
3-OH-COTININE: <2 NG/ML
COTININE: <2 NG/ML
NICOTINE: <2 NG/ML

## 2021-09-01 ENCOUNTER — TELEPHONE (OUTPATIENT)
Dept: BARIATRICS/WEIGHT MGMT | Age: 26
End: 2021-09-01

## 2021-09-01 NOTE — TELEPHONE ENCOUNTER
Nisreen Youngblood #[id number]     10/11/2021 thru 01/11/2022     CPT 27034     ICD 10 E66.01     [address] [facility] Inpatient 10/11/2021

## 2021-09-07 ENCOUNTER — TELEPHONE (OUTPATIENT)
Dept: BARIATRICS/WEIGHT MGMT | Age: 26
End: 2021-09-07

## 2021-09-21 ENCOUNTER — TELEPHONE (OUTPATIENT)
Dept: OBGYN | Age: 26
End: 2021-09-21

## 2021-09-21 NOTE — TELEPHONE ENCOUNTER
Please let pt know we received consult notes from genetic counselor, instruct her to contact us after she has spoke with insurance regarding coverage for testing.

## 2021-09-24 ENCOUNTER — TELEPHONE (OUTPATIENT)
Dept: BARIATRICS/WEIGHT MGMT | Age: 26
End: 2021-09-24

## 2021-09-27 ENCOUNTER — OFFICE VISIT (OUTPATIENT)
Dept: BARIATRICS/WEIGHT MGMT | Age: 26
End: 2021-09-27

## 2021-09-27 VITALS — HEIGHT: 63 IN | WEIGHT: 217.5 LBS | BODY MASS INDEX: 38.54 KG/M2

## 2021-09-27 DIAGNOSIS — E66.9 OBESITY (BMI 30-39.9): Primary | ICD-10-CM

## 2021-09-27 PROCEDURE — 99999 PR OFFICE/OUTPT VISIT,PROCEDURE ONLY: CPT

## 2021-09-27 NOTE — PROGRESS NOTES
ENDOSCOPY       Family History:  Family History   Problem Relation Age of Onset    Heart Disease Mother     Cancer Mother     Diabetes Father     High Blood Pressure Father     Coronary Art Dis Father     Heart Disease Brother     High Blood Pressure Brother     Learning Disabilities Brother     Diabetes Brother     Cancer Maternal Grandfather        Social History:  Social History     Socioeconomic History    Marital status:      Spouse name: Not on file    Number of children: Not on file    Years of education: Not on file    Highest education level: Not on file   Occupational History    Not on file   Tobacco Use    Smoking status: Never Smoker    Smokeless tobacco: Never Used   Vaping Use    Vaping Use: Never used   Substance and Sexual Activity    Alcohol use: Not Currently    Drug use: Yes     Frequency: 7.0 times per week     Comment: CBD GUMMIES    Sexual activity: Yes     Partners: Male   Other Topics Concern    Not on file   Social History Narrative    Not on file     Social Determinants of Health     Financial Resource Strain: Low Risk     Difficulty of Paying Living Expenses: Not hard at all   Food Insecurity: No Food Insecurity    Worried About [address] in the Last Year: Never true    [address] in the Last Year: Never true   Transportation Needs:     Lack of Transportation (Medical):      Lack of Transportation (Non-Medical):    Physical Activity:     Days of Exercise per Week:     Minutes of Exercise per Session:    Stress:     Feeling of Stress :    Social Connections:     Frequency of Communication with Friends and Family:     Frequency of Social Gatherings with Friends and Family:     Attends Holiness Services:     Active Member of Clubs or Organizations:     Attends Club or Organization Meetings:     Marital Status:    Intimate Partner Violence:     Fear of Current or Ex-Partner:     Emotionally Abused:     Physically Abused:     Sexually Abused:          OBJECTIVE:  Physical Exam   Ht 5' 3.25\" (1.607 m)   Wt 217 lb 8 oz (98.7 kg)   LMP 10/18/2018 (Approximate)   BMI 38.22 kg/m²        NUTRITION DIAGNOSIS: Overweight / Obesity   Problem: Increased adiposity compared to reference standard or established norms   Etiology: Excess intake compared to output over time   S/S: Ht: 63.25\" Wt: 217.5 lbs BMI: 38.22    NUTRITION INTERVENTIONS:    Individualized treatment goals to address nutritiondiagnosis:   Instructed on 700-800 kcal diet for weight loss post-op   Provided fluid/activity logs, recipes books and vitamins handout   Encouraged Physical activity as approved by physician    MONITORING/ EVALUATION/ PLAN:   Pt verbalized understanding of allmaterials covered   Pt asked pertinent questions throughout the session - expect compliance with nutrition guidelines presented   Provided pt with contact information should questions arise prior to next visit   Will f/u with pt post-op  VANDANA Meier MS, RDN, LD  9/27/2021

## 2021-09-30 ENCOUNTER — OFFICE VISIT (OUTPATIENT)
Dept: BARIATRICS/WEIGHT MGMT | Age: 26
End: 2021-09-30
Payer: MEDICAID

## 2021-09-30 VITALS
HEART RATE: 84 BPM | HEIGHT: 63 IN | DIASTOLIC BLOOD PRESSURE: 72 MMHG | BODY MASS INDEX: 38.04 KG/M2 | SYSTOLIC BLOOD PRESSURE: 114 MMHG | OXYGEN SATURATION: 96 % | WEIGHT: 214.7 LBS

## 2021-09-30 DIAGNOSIS — E66.01 MORBID OBESITY DUE TO EXCESS CALORIES (HCC): Primary | ICD-10-CM

## 2021-09-30 PROCEDURE — G8417 CALC BMI ABV UP PARAM F/U: HCPCS | Performed by: SURGERY

## 2021-09-30 PROCEDURE — G8427 DOCREV CUR MEDS BY ELIG CLIN: HCPCS | Performed by: SURGERY

## 2021-09-30 PROCEDURE — 99213 OFFICE O/P EST LOW 20 MIN: CPT | Performed by: SURGERY

## 2021-09-30 PROCEDURE — 1036F TOBACCO NON-USER: CPT | Performed by: SURGERY

## 2021-09-30 RX ORDER — HEPARIN SODIUM 5000 [USP'U]/ML
5000 INJECTION, SOLUTION INTRAVENOUS; SUBCUTANEOUS ONCE
Status: CANCELLED | OUTPATIENT
Start: 2021-09-30 | End: 2021-09-30

## 2021-09-30 RX ORDER — ONDANSETRON 2 MG/ML
4 INJECTION INTRAMUSCULAR; INTRAVENOUS ONCE
Status: CANCELLED | OUTPATIENT
Start: 2021-09-30 | End: 2021-09-30

## 2021-09-30 RX ORDER — SCOLOPAMINE TRANSDERMAL SYSTEM 1 MG/1
1 PATCH, EXTENDED RELEASE TRANSDERMAL ONCE
Status: CANCELLED | OUTPATIENT
Start: 2021-09-30 | End: 2021-09-30

## 2021-09-30 RX ORDER — SODIUM CHLORIDE 0.9 % (FLUSH) 0.9 %
5-40 SYRINGE (ML) INJECTION EVERY 12 HOURS SCHEDULED
Status: CANCELLED | OUTPATIENT
Start: 2021-09-30

## 2021-09-30 RX ORDER — SODIUM CHLORIDE 0.9 % (FLUSH) 0.9 %
5-40 SYRINGE (ML) INJECTION PRN
Status: CANCELLED | OUTPATIENT
Start: 2021-09-30

## 2021-09-30 RX ORDER — SODIUM CHLORIDE 9 MG/ML
25 INJECTION, SOLUTION INTRAVENOUS PRN
Status: CANCELLED | OUTPATIENT
Start: 2021-09-30

## 2021-09-30 RX ORDER — SODIUM CHLORIDE, SODIUM LACTATE, POTASSIUM CHLORIDE, CALCIUM CHLORIDE 600; 310; 30; 20 MG/100ML; MG/100ML; MG/100ML; MG/100ML
INJECTION, SOLUTION INTRAVENOUS CONTINUOUS
Status: CANCELLED | OUTPATIENT
Start: 2021-09-30

## 2021-09-30 NOTE — PROGRESS NOTES
BARIATRIC SURGERY OFFICE PROGRESS NOTE    SUBJECTIVE:    Patient presenting today referred from MIREYA Ruiz CNP, for   Chief Complaint   Patient presents with    New Patient     Meet/greet with patient, sleeve scheduled on 10/11/21   . Vitals:    09/30/21 1247   BP: 114/72   Pulse: 84   SpO2: 96%        BMI: Body mass index is 37.73 kg/m². Weight History: Wt Readings from Last 3 Encounters:   09/30/21 214 lb 11.2 oz (97.4 kg)   09/27/21 217 lb 8 oz (98.7 kg)   08/18/21 211 lb 9.6 oz (96 kg)        If within 30 days of bariatric surgery date, have you been to the ED: [address] is a 27 y.o. female presenting in seventh bariatric PRE-OP visit. Total weight loss/gain: -2.8 lbs since last visit, n/a lbs since surgery, -36.8 lbs since starting program    Thoroughly reviewed the patient's medical history, family history, social history and review of systems with the patient today in the office. Please see medical record for pertinent positives. Changes in health since last visit: None. No changes in health or medications. Pt tracking calories: Yes. Pt exercising: Yes.        Past Medical History:   Diagnosis Date    37 weeks gestation of pregnancy 06/28/2016    Anemia     H/O echocardiogram 05/21/2021    EF 55-60% no evidence of pericardial effusion no significant valvular regurgitation    History of postpartum hemorrhage, currently pregnant in third trimester 06/28/2016    Hx of cardiovascular stress test 05/07/2021    Normal study    Migraines     Preeclampsia 2014    Sciatica         Patient Active Problem List   Diagnosis    Diarrhea    Non morbid obesity due to excess calories    Migraine without aura and without status migrainosus, not intractable    Depression    Muscle spasm of back    Anxiety    Latex allergy    S/P laparoscopic cholecystectomy    Morbid obesity with BMI of 40.0-44.9, adult ([address]    Esophageal reflux    Morbid obesity due to excess calories ([facility] [address]       Past Surgical History:   Procedure Laterality Date    CHOLECYSTECTOMY  12/13/2018    DENTAL SURGERY      wisdom teeth    HYSTERECTOMY      SALPINGECTOMY      UPPER GASTROINTESTINAL ENDOSCOPY N/A 4/12/2021    EGD BIOPSY performed by Yane Mendez MD at [facility] ENDOSCOPY       Current Outpatient Medications   Medication Sig Dispense Refill    EPINEPHrine (EPIPEN 2-CLEOPATRA) 0.3 MG/0.3ML SOAJ injection Use as directed for allergic reaction 2 each 0    nystatin (MYCOSTATIN) 693824 UNIT/GM powder Apply 3 times daily. 1 Bottle 5    nystatin (MYCOSTATIN) 198639 UNIT/GM ointment Apply topically 2 times daily. 1 Tube 0    melatonin 5 MG TABS tablet       propranolol (INDERAL) 20 MG tablet       buPROPion (WELLBUTRIN SR) 100 MG extended release tablet       busPIRone (BUSPAR) 15 MG tablet       sertraline (ZOLOFT) 50 MG tablet Take 1 tablet by mouth daily 30 tablet 5    Multiple Vitamins-Minerals (THERAPEUTIC MULTIVITAMIN-MINERALS) tablet Take 1 tablet by mouth daily (Patient not taking: Reported on 9/30/2021)       No current facility-administered medications for this visit. Allergies   Allergen Reactions    Latex Hives and Rash    Macrobid [Nitrofurantoin Macrocrystal] Hives     No SOB or closing of throat noted when pt had reaction      Norco [Hydrocodone-Acetaminophen]      Makes her really sick to her stomach; hard to wake up with,it keeps her asleep    Other      Prolene suture    Ropivacaine     Tape [Adhesive Tape] Rash         Review of Systems   All other systems reviewed and are negative. OBJECTIVE:    /72   Pulse 84   Ht 5' 3.25\" (1.607 m)   Wt 214 lb 11.2 oz (97.4 kg)   LMP 10/18/2018 (Approximate)   SpO2 96%   BMI 37.73 kg/m²      Physical Exam  Vitals reviewed. Constitutional:       General: She is not in acute distress. Appearance: She is obese. She is not ill-appearing, toxic-appearing or diaphoretic.    HENT:      Head: Normocephalic and atraumatic. Right Ear: External ear normal.      Left Ear: External ear normal.      Nose: Nose normal.   Eyes:      General:         Right eye: No discharge. Left eye: No discharge. Extraocular Movements: Extraocular movements intact. Cardiovascular:      Rate and Rhythm: Normal rate. Pulmonary:      Effort: Pulmonary effort is normal.   Abdominal:      General: There is no distension. Musculoskeletal:         General: No swelling. Cervical back: Normal range of motion. Skin:     General: Skin is warm. Neurological:      General: No focal deficit present. Mental Status: She is alert. Psychiatric:         Mood and Affect: Mood normal.         ASSESSMENT & PLAN:    1. Morbid obesity due to excess calories ([facility] [address]  -Consent obtained. Reviewed in detail with the patient and/or family the expected pre-operative, operative, and post-operative courses including risks, benefits, and alternatives to the procedure. The patient's questions were answered in detail and agreed to proceed with the procedure.     -Preop COVID testing. The patient was counseled at length about the risks of shayla Covid-19 during their perioperative period and any recovery window from their procedure. The patient was made aware that shayla Covid-19  may worsen their prognosis for recovering from their procedure  and lend to a higher morbidity and/or mortality risk. All material risks, benefits, and reasonable alternatives including postponing the procedure were discussed. The patient does wish to proceed with the procedure at this time.    -Pt already met with RD for liver shrinking diet. No questions about this.     -Pre op orders placed.     -Proceed as scheduled.     -Call with any questions, concerns, or issues whatsoever.           As of current visit, regarding obesity-related co-morbid conditions:  DEB [] compliant [] no longer using [] resolved per sleep study; hypertension [] medications; hyperlipidemia [] medications; GERD [] medications; DM [] insulin [] non-insulin [] no meds       Patient was encouraged to journal all food intake. Keep calorie level at approximately 1200, per discussion / plan with registered dietician. Protein intake is to be a minimum of 40-50 grams per day. Water drinking was encouraged with a goal of 64oz-128oz daily. Beverages are to be calorie free except for milk. Avoid soda and other carbonated beverages. Continue to increase level of physical activity. I spent 25 minutes with the patient face to face today and over 50% of the office visit today was spent in face to face counseling regarding diet and exercise, in preparation for her planned robotic sleeve gastrectomy. Discussed in length complying with the dietary recommendations, complying with the preoperative workup including dietary counseling , exercise physiologist counseling , and pre-operative optimization of pulmonologist  and cardiologist .    The patient expressed understanding and willingness to comply nicely; all questions and concerns addressed. No orders of the defined types were placed in this encounter. Orders Placed This Encounter   Procedures    CBC Auto Differential     Standing Status:   Standing     Number of Occurrences:   1    Comprehensive Metabolic Panel     Standing Status:   Standing     Number of Occurrences:   1    Urine Drug Screen     Standing Status:   Standing     Number of Occurrences:   1    Hemoglobin A1C     Standing Status:   Standing     Number of Occurrences:   1    Pregnancy, Urine     Standing Status:   Standing     Number of Occurrences:   1    Initiate PAT Protocol     Standing Status:   Future     Standing Expiration Date:   11/29/2021       Follow Up:  No follow-ups on file.     Candy Rizzo MD

## 2021-09-30 NOTE — PROGRESS NOTES
.Surgery is on 10/11/12   you will be called 10/8/21     with times               1. Do not eat or drink anything after midnight - unless instructed by your doctor prior to surgery. This includes                   no water, chewing gum or mints. 2. Follow your directions as prescribed by the doctor for your procedure and medications. 3. Check with your Doctor regarding stopping vitamins, supplements, blood thinners (Plavix, Coumadin, Lovenox, Effient, Pradaxa, Xarelto, Fragmin or                   other blood thinners) and follow their instructions. Stop all supplements and/or Herbals 7 days before surgery    Morning of surgery you can take Propranolol  And Buspar with a small sip of water. 4. Do not smoke, and do not drink any alcoholic beverages 24 hours prior to surgery. This includes NA Beer. 5. You may brush your teeth and gargle the morning of surgery. DO NOT SWALLOW WATER   6. You MUST make arrangements for a responsible adult to take you home after your surgery and be able to check on you every couple                   hours for the day. You will not be allowed to leave alone or drive yourself home. It is strongly suggested someone stay with you the first 24                   hrs. Your surgery will be cancelled if you do not have a ride home. 7. Please wear simple, loose fitting clothing to the hospital.  Duncan Winters not bring valuables (money, credit cards, checkbooks, etc.) Do not wear any                   makeup (including no eye makeup) or nail polish on your fingers or toes. 8. DO NOT wear any jewelry or piercings on day of surgery. All body piercing jewelry must be removed. 9. If you have dentures, they will be removed before going to the OR; we will provide you a container. If you wear contact lenses or glasses,                  they will be removed; please bring a case for them.            10. If you  have a Living Will and Durable Power of [address] [place] [address], please bring in a copy. 11. Please bring picture ID,  insurance card, paperwork from the doctors office    (H & P, Consent, & card for implantable devices). 12. Take a shower the night before or morning of your procedure, do not apply any lotion, oil or powder. 13. Wear a mask covering your nose & mouth when entering the hospital. Have your covid-19 test performed within 2-7 days of your                  surgery. Quarantine yourself after the test until after your surgery.   COVID TEST 10/5/21

## 2021-10-03 ENCOUNTER — TELEPHONE (OUTPATIENT)
Dept: OBGYN | Age: 26
End: 2021-10-03

## 2021-10-04 DIAGNOSIS — Z01.818 PRE-OP TESTING: Primary | ICD-10-CM

## 2021-10-04 NOTE — TELEPHONE ENCOUNTER
Please let pt know that her insurance has denied the coverage for genetic testing of Barton Syndrome

## 2021-10-05 ENCOUNTER — HOSPITAL ENCOUNTER (OUTPATIENT)
Age: 26
Setting detail: SPECIMEN
Discharge: HOME | End: 2021-10-05
Payer: MEDICAID

## 2021-10-05 ENCOUNTER — NURSE ONLY (OUTPATIENT)
Dept: SURGERY | Age: 26
End: 2021-10-05
Payer: MEDICAID

## 2021-10-05 VITALS — WEIGHT: 211.7 LBS | HEIGHT: 63 IN | BODY MASS INDEX: 37.51 KG/M2

## 2021-10-05 DIAGNOSIS — Z01.818 PRE-OP TESTING: Primary | ICD-10-CM

## 2021-10-05 PROCEDURE — U0005 INFEC AGEN DETEC AMPLI PROBE: HCPCS

## 2021-10-05 PROCEDURE — U0003 INFECTIOUS AGENT DETECTION BY NUCLEIC ACID (DNA OR RNA); SEVERE ACUTE RESPIRATORY SYNDROME CORONAVIRUS 2 (SARS-COV-2) (CORONAVIRUS DISEASE [COVID-19]), AMPLIFIED PROBE TECHNIQUE, MAKING USE OF HIGH THROUGHPUT TECHNOLOGIES AS DESCRIBED BY CMS-2020-01-R: HCPCS

## 2021-10-05 PROCEDURE — 99211 OFF/OP EST MAY X REQ PHY/QHP: CPT | Performed by: SURGERY

## 2021-10-05 NOTE — PATIENT INSTRUCTIONS
Pre-Procedure COVID-19 Self Testing  Quarantine Instructions  Day of Surgery Instructions         What to do before my surgery:    All patients scheduled for elective surgery must test for COVID19 72-96 hours prior to the surgery date.  Pre-Procedure COVID-19 Self-Test will be scheduled for you by your provider.  You can receive your Pre-Procedure COVID-19 Self-Test at:  [facility]. [address] [facility], [facility] [address]   If you do not have the COVID-19 test we will cancel or reschedule your procedure   Once you test you must quarantine at home until after your procedure with only your immediate family members or whoever lives with you.  If you must work during your quarantine period, we ask that you continue to practice social distancing, wear a mask that covers your mouth and nose and perform all hand hygiene as recommended by the CDC.  If you must go to the grocery, etc. and cannot get someone to do this for you please wear a mask that covers your mouth and nose and perform all hand hygiene as recommended by the CDC.  Your surgeon's office will notify you with any concerns about your test result. What can I expect on the day of surgery?  Arrive at the time the office or hospital staff tell you on the day of your procedure.  Wear a mask when entering the hospital.     A member of the hospital staff will take your temperature and ask you a few questions as you enter the building.  In abundance of caution for the safety of all our patients and staff, please follow all hospital visitor guidelines in place at the time of your procedure. The staff caring for you will stay in close communication with your loved one and keep them updated on progress.  Please provide a phone number for us to use when communicating with your family or ride home.    When you are ready to discharge, we will notify your family/person with you to bring the car to the front entrance. We will take you to them after you receive all of your discharge instructions.

## 2021-10-06 LAB
SARS-COV-2: NOT DETECTED
SOURCE: NORMAL

## 2021-10-08 ENCOUNTER — OFFICE VISIT (OUTPATIENT)
Dept: BARIATRICS/WEIGHT MGMT | Age: 26
End: 2021-10-08
Payer: MEDICAID

## 2021-10-08 ENCOUNTER — ANESTHESIA EVENT (OUTPATIENT)
Dept: OPERATING ROOM | Age: 26
End: 2021-10-08
Payer: MEDICAID

## 2021-10-08 VITALS
HEIGHT: 63 IN | WEIGHT: 212.5 LBS | OXYGEN SATURATION: 96 % | SYSTOLIC BLOOD PRESSURE: 120 MMHG | BODY MASS INDEX: 37.65 KG/M2 | HEART RATE: 76 BPM | DIASTOLIC BLOOD PRESSURE: 80 MMHG

## 2021-10-08 DIAGNOSIS — Z01.818 PRE-OP EVALUATION: Primary | ICD-10-CM

## 2021-10-08 PROCEDURE — 1036F TOBACCO NON-USER: CPT | Performed by: NURSE PRACTITIONER

## 2021-10-08 PROCEDURE — 99213 OFFICE O/P EST LOW 20 MIN: CPT | Performed by: NURSE PRACTITIONER

## 2021-10-08 PROCEDURE — G8417 CALC BMI ABV UP PARAM F/U: HCPCS | Performed by: NURSE PRACTITIONER

## 2021-10-08 PROCEDURE — G8484 FLU IMMUNIZE NO ADMIN: HCPCS | Performed by: NURSE PRACTITIONER

## 2021-10-08 PROCEDURE — G8427 DOCREV CUR MEDS BY ELIG CLIN: HCPCS | Performed by: NURSE PRACTITIONER

## 2021-10-08 ASSESSMENT — ENCOUNTER SYMPTOMS
CHEST TIGHTNESS: 0
WHEEZING: 0
DIARRHEA: 0
NAUSEA: 0
COLOR CHANGE: 0
PHOTOPHOBIA: 0
BACK PAIN: 0
APNEA: 0
SHORTNESS OF BREATH: 0
SORE THROAT: 0

## 2021-10-08 NOTE — ANESTHESIA PRE PROCEDURE
Department of Anesthesiology  Preprocedure Note       Name:  Debbie Garcia   Age:  27 y.o.  :  1995                                          MRN:  [id number]         Date:  10/8/2021      Surgeon: Santos Jefferson):  Eric Boyce MD    Procedure: Procedure(s):  GASTRECTOMY SLEEVE LAPAROSCOPIC ROBOTIC    Medications prior to admission:   Prior to Admission medications    Medication Sig Start Date End Date Taking? Authorizing Provider   EPINEPHrine (EPIPEN 2-CLEOPATRA) 0.3 MG/0.3ML SOAJ injection Use as directed for allergic reaction 21   Prabhakar Simpson MD   nystatin (MYCOSTATIN) 495297 UNIT/GM powder Apply 3 times daily. 21   Gilbert Beltran MD   nystatin (MYCOSTATIN) 065960 UNIT/GM ointment Apply topically 2 times daily. 8/3/21   MIREYA Garcia CNP   melatonin 5 MG TABS tablet  7/10/21   Historical Provider, MD   propranolol (INDERAL) 20 MG tablet  21   Historical Provider, MD   buPROPion [facility] SR) 100 MG extended release tablet  21   Historical Provider, MD   busPIRone (BUSPAR) 15 MG tablet  21   Historical Provider, MD   Multiple Vitamins-Minerals (THERAPEUTIC MULTIVITAMIN-MINERALS) tablet Take 1 tablet by mouth daily  Patient not taking: Reported on 2021    Historical Provider, MD   sertraline (ZOLOFT) 50 MG tablet Take 1 tablet by mouth daily 20   MIREYA Little CNP       Current medications:    No current facility-administered medications for this encounter. Current Outpatient Medications   Medication Sig Dispense Refill    EPINEPHrine (EPIPEN 2-CLEOPATRA) 0.3 MG/0.3ML SOAJ injection Use as directed for allergic reaction 2 each 0    nystatin (MYCOSTATIN) 429064 UNIT/GM powder Apply 3 times daily. 1 Bottle 5    nystatin (MYCOSTATIN) 488269 UNIT/GM ointment Apply topically 2 times daily.  1 Tube 0    melatonin 5 MG TABS tablet       propranolol (INDERAL) 20 MG tablet       buPROPion (WELLBUTRIN SR) 100 MG extended release tablet       busPIRone (BUSPAR) 15 MG tablet       Multiple Vitamins-Minerals (THERAPEUTIC MULTIVITAMIN-MINERALS) tablet Take 1 tablet by mouth daily (Patient not taking: Reported on 9/30/2021)      sertraline (ZOLOFT) 50 MG tablet Take 1 tablet by mouth daily 30 tablet 5       Allergies: Allergies   Allergen Reactions    Latex Hives and Rash    Macrobid [Nitrofurantoin Macrocrystal] Hives     No SOB or closing of throat noted when pt had reaction      Norco [Hydrocodone-Acetaminophen]      Makes her really sick to her stomach; hard to wake up with,it keeps her asleep    Other      Prolene suture    Ropivacaine     Tape Max Blackbird Tape] Rash       Problem List:    Patient Active Problem List   Diagnosis Code    Diarrhea R19.7    Non morbid obesity due to excess calories E66.09    Migraine without aura and without status migrainosus, not intractable G43.009    Depression F32. A    Muscle spasm of back M62.830    Anxiety F41.9    Latex allergy Z91.040    S/P laparoscopic cholecystectomy Z90.49    Morbid obesity with BMI of 40.0-44.9, adult ([facility]) E66.01, Z68.41    Esophageal reflux K21.9    Morbid obesity due to excess calories ([facility]) E66.01       Past Medical History:        Diagnosis Date    37 weeks gestation of pregnancy 06/28/2016    Anemia     H/O echocardiogram 05/21/2021    EF 55-60% no evidence of pericardial effusion no significant valvular regurgitation    History of postpartum hemorrhage, currently pregnant in third trimester 06/28/2016    Hx of cardiovascular stress test 05/07/2021    Normal study    Migraines     Preeclampsia 2014    Sciatica        Past Surgical History:        Procedure Laterality Date    CHOLECYSTECTOMY  12/13/2018    DENTAL SURGERY      wisdom teeth    HYSTERECTOMY      SALPINGECTOMY      UPPER GASTROINTESTINAL ENDOSCOPY N/A 4/12/2021    EGD BIOPSY performed by Malina Mckeon MD at [address] [place] [address] History:    Social History     Tobacco Use    Smoking status: Never Smoker    Smokeless tobacco: Never Used   Substance Use Topics    Alcohol use: Yes     Comment: rarely                                Counseling given: Not Answered      Vital Signs (Current):   Vitals:    09/30/21 1325   Weight: 214 lb (97.1 kg)   Height: 5' 3.25\" (1.607 m)                                              BP Readings from Last 3 Encounters:   09/30/21 114/72   08/18/21 98/66   08/03/21 103/72       NPO Status:                                                                                 BMI:   Wt Readings from Last 3 Encounters:   10/05/21 211 lb 11.2 oz (96 kg)   09/30/21 214 lb 11.2 oz (97.4 kg)   09/27/21 217 lb 8 oz (98.7 kg)     Body mass index is 37.61 kg/m². CBC:   Lab Results   Component Value Date    WBC 8.8 03/24/2021    RBC 4.79 03/24/2021    HGB 13.3 03/24/2021    HCT 40.4 03/24/2021    MCV 84.3 03/24/2021    RDW 13.2 03/24/2021     03/24/2021       CMP:   Lab Results   Component Value Date     03/24/2021    K 4.5 03/24/2021     03/24/2021    CO2 22 03/24/2021    BUN 17 03/24/2021    CREATININE 0.9 03/24/2021    GFRAA >60 03/24/2021    AGRATIO 1.7 11/07/2018    LABGLOM >60 03/24/2021    GLUCOSE 90 03/24/2021    PROT 7.0 03/24/2021    CALCIUM 9.5 03/24/2021    BILITOT 0.3 03/24/2021    ALKPHOS 68 03/24/2021    AST 19 03/24/2021    ALT 21 03/24/2021       POC Tests: No results for input(s): POCGLU, POCNA, POCK, POCCL, POCBUN, POCHEMO, POCHCT in the last 72 hours.     Coags: No results found for: PROTIME, INR, APTT    HCG (If Applicable):   Lab Results   Component Value Date    PREGTESTUR NEGATIVE 02/10/2018        ABGs: No results found for: PHART, PO2ART, IMT2JEH, FDN8OBH, BEART, F0TPEPTJ     Type & Screen (If Applicable):  No results found for: LABABO, LABRH    Drug/Infectious Status (If Applicable):  No results found for: HIV, HEPCAB    COVID-19 Screening (If Applicable):   Lab Results   Component Value Date    COVID19 NOT DETECTED 10/05/2021 Anesthesia Evaluation  Patient summary reviewed no history of anesthetic complications:   Airway: Mallampati: III  TM distance: >3 FB   Neck ROM: full  Mouth opening: > = 3 FB Dental: normal exam         Pulmonary:Negative Pulmonary ROS                              Cardiovascular:  Exercise tolerance: good (>4 METS),   (+) hypertension:,          Beta Blocker:  Not on Beta Blocker      ROS comment:  Summary   Left ventricular systolic function is normal with an ejection fraction of   55-60%. No evidence of diastolic dysfunction. No significant valvular regurgitation noted. Normal pulmonary artery pressure with a RVSP of 17mmHg. No evidence of pericardial effusion. Signature      ------------------------------------------------------------------   Electronically signed by Radha Wolff MD   (Interpreting physician) on 05/21/2021 at 05:00 PM     Neuro/Psych:   (+) headaches: migraine headaches, depression/anxiety             GI/Hepatic/Renal:   (+) GERD:,           Endo/Other: Negative Endo/Other ROS                    Abdominal:             Vascular: negative vascular ROS. Other Findings:           Anesthesia Plan      general     ASA 2       Induction: intravenous. MIPS: Postoperative opioids intended and Prophylactic antiemetics administered. Anesthetic plan and risks discussed with patient. Plan discussed with CRNA.                 MIREYA Nichols - CRNA   10/8/2021

## 2021-10-11 ENCOUNTER — HOSPITAL ENCOUNTER (OUTPATIENT)
Age: 26
Setting detail: SURGERY ADMIT
Discharge: HOME | End: 2021-10-11
Attending: SURGERY | Admitting: SURGERY
Payer: MEDICAID

## 2021-10-11 ENCOUNTER — ANESTHESIA (OUTPATIENT)
Dept: OPERATING ROOM | Age: 26
End: 2021-10-11
Payer: MEDICAID

## 2021-10-11 VITALS
OXYGEN SATURATION: 98 % | TEMPERATURE: 97.9 F | DIASTOLIC BLOOD PRESSURE: 72 MMHG | WEIGHT: 213.8 LBS | HEIGHT: 63 IN | HEART RATE: 85 BPM | SYSTOLIC BLOOD PRESSURE: 117 MMHG | BODY MASS INDEX: 37.88 KG/M2 | RESPIRATION RATE: 18 BRPM

## 2021-10-11 PROCEDURE — 6370000000 HC RX 637 (ALT 250 FOR IP): Performed by: SURGERY

## 2021-10-11 PROCEDURE — 6360000002 HC RX W HCPCS: Performed by: SURGERY

## 2021-10-11 PROCEDURE — 2580000003 HC RX 258: Performed by: SURGERY

## 2021-10-11 RX ORDER — CEFAZOLIN SODIUM 2 G/100ML
2000 INJECTION, SOLUTION INTRAVENOUS
Status: DISCONTINUED | OUTPATIENT
Start: 2021-10-11 | End: 2021-10-11 | Stop reason: HOSPADM

## 2021-10-11 RX ORDER — ONDANSETRON 2 MG/ML
4 INJECTION INTRAMUSCULAR; INTRAVENOUS ONCE
Status: COMPLETED | OUTPATIENT
Start: 2021-10-11 | End: 2021-10-11

## 2021-10-11 RX ORDER — HYDRALAZINE HYDROCHLORIDE 20 MG/ML
5 INJECTION INTRAMUSCULAR; INTRAVENOUS EVERY 10 MIN PRN
Status: DISCONTINUED | OUTPATIENT
Start: 2021-10-11 | End: 2021-10-11 | Stop reason: HOSPADM

## 2021-10-11 RX ORDER — SODIUM CHLORIDE 9 MG/ML
25 INJECTION, SOLUTION INTRAVENOUS PRN
Status: DISCONTINUED | OUTPATIENT
Start: 2021-10-11 | End: 2021-10-11 | Stop reason: HOSPADM

## 2021-10-11 RX ORDER — HYDROMORPHONE HCL 110MG/55ML
0.5 PATIENT CONTROLLED ANALGESIA SYRINGE INTRAVENOUS EVERY 5 MIN PRN
Status: DISCONTINUED | OUTPATIENT
Start: 2021-10-11 | End: 2021-10-11 | Stop reason: HOSPADM

## 2021-10-11 RX ORDER — PROMETHAZINE HYDROCHLORIDE 25 MG/ML
6.25 INJECTION, SOLUTION INTRAMUSCULAR; INTRAVENOUS
Status: DISCONTINUED | OUTPATIENT
Start: 2021-10-11 | End: 2021-10-11 | Stop reason: HOSPADM

## 2021-10-11 RX ORDER — DIPHENHYDRAMINE HYDROCHLORIDE 50 MG/ML
12.5 INJECTION INTRAMUSCULAR; INTRAVENOUS
Status: DISCONTINUED | OUTPATIENT
Start: 2021-10-11 | End: 2021-10-11 | Stop reason: HOSPADM

## 2021-10-11 RX ORDER — SODIUM CHLORIDE 0.9 % (FLUSH) 0.9 %
5-40 SYRINGE (ML) INJECTION PRN
Status: DISCONTINUED | OUTPATIENT
Start: 2021-10-11 | End: 2021-10-11 | Stop reason: HOSPADM

## 2021-10-11 RX ORDER — LABETALOL HYDROCHLORIDE 5 MG/ML
5 INJECTION, SOLUTION INTRAVENOUS EVERY 10 MIN PRN
Status: DISCONTINUED | OUTPATIENT
Start: 2021-10-11 | End: 2021-10-11 | Stop reason: HOSPADM

## 2021-10-11 RX ORDER — SODIUM CHLORIDE 0.9 % (FLUSH) 0.9 %
5-40 SYRINGE (ML) INJECTION EVERY 12 HOURS SCHEDULED
Status: DISCONTINUED | OUTPATIENT
Start: 2021-10-11 | End: 2021-10-11 | Stop reason: HOSPADM

## 2021-10-11 RX ORDER — FENTANYL CITRATE 50 UG/ML
50 INJECTION, SOLUTION INTRAMUSCULAR; INTRAVENOUS EVERY 5 MIN PRN
Status: DISCONTINUED | OUTPATIENT
Start: 2021-10-11 | End: 2021-10-11 | Stop reason: HOSPADM

## 2021-10-11 RX ORDER — SODIUM CHLORIDE, SODIUM LACTATE, POTASSIUM CHLORIDE, CALCIUM CHLORIDE 600; 310; 30; 20 MG/100ML; MG/100ML; MG/100ML; MG/100ML
INJECTION, SOLUTION INTRAVENOUS CONTINUOUS
Status: DISCONTINUED | OUTPATIENT
Start: 2021-10-11 | End: 2021-10-11 | Stop reason: HOSPADM

## 2021-10-11 RX ORDER — MEPERIDINE HYDROCHLORIDE 25 MG/ML
12.5 INJECTION INTRAMUSCULAR; INTRAVENOUS; SUBCUTANEOUS EVERY 5 MIN PRN
Status: DISCONTINUED | OUTPATIENT
Start: 2021-10-11 | End: 2021-10-11 | Stop reason: HOSPADM

## 2021-10-11 RX ORDER — HEPARIN SODIUM 5000 [USP'U]/ML
5000 INJECTION, SOLUTION INTRAVENOUS; SUBCUTANEOUS ONCE
Status: COMPLETED | OUTPATIENT
Start: 2021-10-11 | End: 2021-10-11

## 2021-10-11 RX ORDER — HYDROCODONE BITARTRATE AND ACETAMINOPHEN 5; 325 MG/1; MG/1
2 TABLET ORAL PRN
Status: DISCONTINUED | OUTPATIENT
Start: 2021-10-11 | End: 2021-10-11 | Stop reason: HOSPADM

## 2021-10-11 RX ORDER — SCOLOPAMINE TRANSDERMAL SYSTEM 1 MG/1
1 PATCH, EXTENDED RELEASE TRANSDERMAL ONCE
Status: DISCONTINUED | OUTPATIENT
Start: 2021-10-11 | End: 2021-10-11 | Stop reason: HOSPADM

## 2021-10-11 RX ORDER — METOCLOPRAMIDE HYDROCHLORIDE 5 MG/ML
10 INJECTION INTRAMUSCULAR; INTRAVENOUS
Status: DISCONTINUED | OUTPATIENT
Start: 2021-10-11 | End: 2021-10-11 | Stop reason: HOSPADM

## 2021-10-11 RX ORDER — HYDROCODONE BITARTRATE AND ACETAMINOPHEN 5; 325 MG/1; MG/1
1 TABLET ORAL PRN
Status: DISCONTINUED | OUTPATIENT
Start: 2021-10-11 | End: 2021-10-11 | Stop reason: HOSPADM

## 2021-10-11 RX ADMIN — ONDANSETRON 4 MG: 2 INJECTION INTRAMUSCULAR; INTRAVENOUS at 06:58

## 2021-10-11 RX ADMIN — SODIUM CHLORIDE, POTASSIUM CHLORIDE, SODIUM LACTATE AND CALCIUM CHLORIDE: 600; 310; 30; 20 INJECTION, SOLUTION INTRAVENOUS at 06:58

## 2021-10-11 RX ADMIN — HEPARIN SODIUM 5000 UNITS: 5000 INJECTION INTRAVENOUS; SUBCUTANEOUS at 06:57

## 2021-10-11 NOTE — H&P
History and Physical Update    Original H&P done in office on 10/8/2021 (less than 30 days ago). Pt reports the following changes in health since being seen last: None  - Down roughly 4 lbs on liver shrinking diet. She states that she did not adhere to the diet over the weekend. She ate regular food both days. Information relayed to Dr. Avelino Carreon negative on 10/5/2021    Vitals:    10/11/21 0630   BP: 117/72   Pulse: 85   Resp: 18   Temp: 97.9 °F (36.6 °C)   SpO2: 98%       Alert and oriented x 3, no apparent distress at rest  Atraumatic, normocephalic. EOMI. Breathing unlabored. RRR. Soft, non-tender, non-distended. Moves all extremities. Warm, dry. Omer Heimlich is a 26 yo female here today for elective bariatric surgery    -Consent obtained in office. -Abx ordered in office.  -Reviewed expected pre-operative, operative, and post-operative courses. -Answered questions to patient's satisfaction.   -Reviewed risks, benefits, alternative to procedure.   -Proceed as scheduled. -The patient was counseled at length about the risks of shayla Covid-19 during their perioperative period and any recovery window from their procedure. The patient was made aware that shayla Covid-19  may worsen their prognosis for recovering from their procedure  and lend to a higher morbidity and/or mortality risk. All material risks, benefits, and reasonable alternatives including postponing the procedure were discussed. The patient does wish to proceed with the procedure at this time. Ismael Hernandez, APRN - CNP       Pt reports she ate regular foods on Saturday and Sunday \"I ate regular food all weekend\" despite knowing these items were not on the liver shrinking diet. Did not follow the pre op liver shrinking diet. Surgery cancelled. D/w pt and her  that if they want to follow their own plan they can; however, we have our plan in place for a reason--namely safety.     Will need to repeat pre op diet. Did d/w pt that if she wants to follow her own plan then will have to refer to another location. Pt expressed understanding.     Katiana Rosario MD

## 2021-12-09 ENCOUNTER — TELEPHONE (OUTPATIENT)
Dept: OBGYN | Age: 26
End: 2021-12-09

## 2021-12-09 ENCOUNTER — OFFICE VISIT (OUTPATIENT)
Dept: OBGYN | Age: 26
End: 2021-12-09
Payer: MEDICAID

## 2021-12-09 VITALS
HEIGHT: 60 IN | DIASTOLIC BLOOD PRESSURE: 82 MMHG | WEIGHT: 227 LBS | BODY MASS INDEX: 44.57 KG/M2 | SYSTOLIC BLOOD PRESSURE: 130 MMHG

## 2021-12-09 DIAGNOSIS — L65.9 HAIR LOSS: ICD-10-CM

## 2021-12-09 DIAGNOSIS — R53.83 FATIGUE, UNSPECIFIED TYPE: ICD-10-CM

## 2021-12-09 DIAGNOSIS — N64.4 MASTALGIA: Primary | ICD-10-CM

## 2021-12-09 LAB
A/G RATIO: 1.6 (ref 1.1–2.2)
ALBUMIN SERPL-MCNC: 4.7 G/DL (ref 3.4–5)
ALP BLD-CCNC: 78 U/L (ref 40–129)
ALT SERPL-CCNC: 16 U/L (ref 10–40)
ANION GAP SERPL CALCULATED.3IONS-SCNC: 14 MMOL/L (ref 3–16)
AST SERPL-CCNC: 14 U/L (ref 15–37)
BILIRUB SERPL-MCNC: 0.3 MG/DL (ref 0–1)
BUN BLDV-MCNC: 11 MG/DL (ref 7–20)
CALCIUM SERPL-MCNC: 9.8 MG/DL (ref 8.3–10.6)
CHLORIDE BLD-SCNC: 104 MMOL/L (ref 99–110)
CO2: 21 MMOL/L (ref 21–32)
CREAT SERPL-MCNC: 0.8 MG/DL (ref 0.6–1.1)
GFR AFRICAN AMERICAN: >60
GFR NON-AFRICAN AMERICAN: >60
GLUCOSE BLD-MCNC: 90 MG/DL (ref 70–99)
HCT VFR BLD CALC: 41.5 % (ref 36–48)
HEMOGLOBIN: 13.6 G/DL (ref 12–16)
MCH RBC QN AUTO: 28.3 PG (ref 26–34)
MCHC RBC AUTO-ENTMCNC: 32.9 G/DL (ref 31–36)
MCV RBC AUTO: 86 FL (ref 80–100)
PDW BLD-RTO: 13.4 % (ref 12.4–15.4)
PLATELET # BLD: 345 K/UL (ref 135–450)
PMV BLD AUTO: 9.2 FL (ref 5–10.5)
POTASSIUM SERPL-SCNC: 4.1 MMOL/L (ref 3.5–5.1)
RBC # BLD: 4.82 M/UL (ref 4–5.2)
SODIUM BLD-SCNC: 139 MMOL/L (ref 136–145)
T3 FREE: 3 PG/ML (ref 2.3–4.2)
T4 FREE: 1.2 NG/DL (ref 0.9–1.8)
TOTAL PROTEIN: 7.7 G/DL (ref 6.4–8.2)
TSH SERPL DL<=0.05 MIU/L-ACNC: 1.6 UIU/ML (ref 0.27–4.2)
WBC # BLD: 9.5 K/UL (ref 4–11)

## 2021-12-09 PROCEDURE — 36415 COLL VENOUS BLD VENIPUNCTURE: CPT | Performed by: NURSE PRACTITIONER

## 2021-12-09 PROCEDURE — G8427 DOCREV CUR MEDS BY ELIG CLIN: HCPCS | Performed by: NURSE PRACTITIONER

## 2021-12-09 PROCEDURE — 99213 OFFICE O/P EST LOW 20 MIN: CPT | Performed by: NURSE PRACTITIONER

## 2021-12-09 PROCEDURE — 1036F TOBACCO NON-USER: CPT | Performed by: NURSE PRACTITIONER

## 2021-12-09 PROCEDURE — G8417 CALC BMI ABV UP PARAM F/U: HCPCS | Performed by: NURSE PRACTITIONER

## 2021-12-09 PROCEDURE — G8484 FLU IMMUNIZE NO ADMIN: HCPCS | Performed by: NURSE PRACTITIONER

## 2021-12-09 ASSESSMENT — ENCOUNTER SYMPTOMS
RESPIRATORY NEGATIVE: 1
GASTROINTESTINAL NEGATIVE: 1

## 2021-12-09 NOTE — PROGRESS NOTES
12/9/21    [facility]  1995    Chief Complaint   Patient presents with    Breast Problem     Pt c/o bilateral breast pain x 1 month, states she feel full like when she breast fed, constant dull achey pain. Pt also c/o hair loss. Merced Read is a 27 y.o. female who presents today for evaluation of mastalgia, fatigue and hair loss    Past Medical History:   Diagnosis Date    37 weeks gestation of pregnancy 06/28/2016    Anemia     H/O echocardiogram 05/21/2021    EF 55-60% no evidence of pericardial effusion no significant valvular regurgitation    History of postpartum hemorrhage, currently pregnant in third trimester 06/28/2016    Hx of cardiovascular stress test 05/07/2021    Normal study    Migraines     Preeclampsia 2014    Sciatica        Past Surgical History:   Procedure Laterality Date    CHOLECYSTECTOMY  12/13/2018    DENTAL SURGERY      wisdom teeth    HYSTERECTOMY      SALPINGECTOMY      UPPER GASTROINTESTINAL ENDOSCOPY N/A 4/12/2021    [facility]GD [facility]OPSY [facility]as [facility] at [facility] END[facility]COPY       Social History     Tobacco Use    Smoking status: Never Smoker    Smokeless tobacco: Never Used   Vaping Use    Vaping Use: Never used   Substance Use Topics    Alcohol use: Yes     Comment: rarely    Drug use: Not Currently     Frequency: 7.0 times per week     Comment: CBD GUMMIES       Family History   Problem Relation Age of Onset    Heart Disease Mother     Cancer Mother     Diabetes Father     High Blood Pressure Father     Coronary Art Dis Father     Heart Disease Brother     High Blood Pressure Brother     Learning Disabilities Brother     Diabetes Brother     Cancer Maternal Grandfather        Current Outpatient Medications   Medication Sig Dispense Refill    EPINEPHrine (EPIPEN 2-CLEOPATRA) 0.3 MG/0.3ML SOAJ injection Use as directed for allergic reaction 2 each 0    nystatin (MYCOSTATIN) 888693 UNIT/GM powder Apply 3 times daily.  1 Bottle 5    nystatin (MYCOSTATIN) 809518 UNIT/GM ointment Apply topically 2 times daily. 1 Tube 0    melatonin 5 MG TABS tablet       propranolol (INDERAL) 20 MG tablet       buPROPion (WELLBUTRIN SR) 100 MG extended release tablet       busPIRone (BUSPAR) 15 MG tablet       Multiple Vitamins-Minerals (THERAPEUTIC MULTIVITAMIN-MINERALS) tablet Take 1 tablet by mouth daily       sertraline (ZOLOFT) 50 MG tablet Take 1 tablet by mouth daily 30 tablet 5     No current facility-administered medications for this visit. Allergies   Allergen Reactions    Latex Hives and Rash    Macrobid [Nitrofurantoin Macrocrystal] Hives     No SOB or closing of throat noted when pt had reaction      Norco [Hydrocodone-Acetaminophen]      Makes her really sick to her stomach; hard to wake up with,it keeps her asleep    Other      Prolene suture    Ropivacaine     Tape [Adhesive Tape] Rash           Immunization History   Administered Date(s) Administered    DTaP 1995, 1995, 01/10/1994, 1995, 1999    HPV Bivalent (Cervarix) 2010, 2011, 2012    Hepatitis A 2010, 2011    Hepatitis B 1995, 1995, 1995    Hepatitis B (Recombivax HB) 1995, 1995, 1993    Hib PRP-OMP (PedvaxHIB) 1993, 1993, 01/10/1994, 1995    Hib, unspecified 1993, 1993, 01/10/1994, 1995    Influenza Virus Vaccine 10/27/2015, 10/25/2018    Influenza, Quadv, IM, (6 mo and older Fluzone, Flulaval, Fluarix and 3 yrs and older Afluria) 10/12/2016    MMR 1995, 1999    Meningococcal ACWY Vaccine 2010    PPD Test 05/15/2013, 2013    Polio IPV (IPOL) 1993, 1993, 1995, 1999    Tdap (Boostrix, Adacel) 2010, 2013, 2014, 2016    Varicella (Varivax) 1997, 2010       Review of Systems   Constitutional: Negative. Respiratory: Negative.     Gastrointestinal:

## 2021-12-12 DIAGNOSIS — N64.4 MASTALGIA IN FEMALE: Primary | ICD-10-CM

## 2022-01-10 ENCOUNTER — TELEPHONE (OUTPATIENT)
Dept: BARIATRICS/WEIGHT MGMT | Age: 27
End: 2022-01-10

## 2022-01-13 ENCOUNTER — HOSPITAL ENCOUNTER (OUTPATIENT)
Dept: ULTRASOUND IMAGING | Age: 27
Discharge: HOME | End: 2022-01-13
Payer: MEDICAID

## 2022-01-13 ENCOUNTER — HOSPITAL ENCOUNTER (OUTPATIENT)
Dept: WOMENS IMAGING | Age: 27
Discharge: HOME | End: 2022-01-13
Payer: MEDICAID

## 2022-01-13 DIAGNOSIS — N64.4 MASTALGIA: ICD-10-CM

## 2022-01-13 DIAGNOSIS — N64.4 MASTALGIA IN FEMALE: ICD-10-CM

## 2022-01-13 PROCEDURE — 76642 ULTRASOUND BREAST LIMITED: CPT

## 2022-01-13 PROCEDURE — 76641 ULTRASOUND BREAST COMPLETE: CPT

## 2022-01-31 ENCOUNTER — OFFICE VISIT (OUTPATIENT)
Dept: BARIATRICS/WEIGHT MGMT | Age: 27
End: 2022-01-31

## 2022-01-31 VITALS — BODY MASS INDEX: 38.89 KG/M2 | HEIGHT: 63 IN | WEIGHT: 219.5 LBS

## 2022-01-31 DIAGNOSIS — E66.9 OBESITY (BMI 30-39.9): Primary | ICD-10-CM

## 2022-01-31 PROCEDURE — 99999 PR OFFICE/OUTPT VISIT,PROCEDURE ONLY: CPT

## 2022-01-31 NOTE — PROGRESS NOTES
Outpatient Nutrition Counseling    REASON FOR VISIT: Pre-Op Class    Chief Complaint:    Chief Complaint   Patient presents with    Weight Management       SUBJECTIVE:  Pt here for pre-op class to start 2 week liquid liver shrinking in preparation for sleeve gastrectomy. Instructed on pre-op diet and complete post-op diet progression including tips for N/V, fluid/activity logs, recipes and vitamins. Pt voiced understanding to all info provided. The patient is a 27 y.o. female being seen for morbid obesity, considering weight loss surgery; Estefani's, Height: 5' 3.25\" (160.7 cm), Weight: 219 lb 8 oz (99.6 kg), Current Body mass index is 38.58 kg/m². The patient's PCP is MIREYA Krishnamurthy CNP   Suzannes life is significantlyaffected by weight related to her co-morbidities. Comorbid Conditions:  Significant diseases affecting this patient are   Past Medical History:   Diagnosis Date    37 weeks gestation of pregnancy 06/28/2016    Anemia     H/O echocardiogram 05/21/2021    EF 55-60% no evidence of pericardial effusion no significant valvular regurgitation    History of postpartum hemorrhage, currently pregnant in third trimester 06/28/2016    Hx of cardiovascular stress test 05/07/2021    Normal study    Migraines     Preeclampsia 2014    Sciatica    . Review of Systems - Review of Systems  Otherwise per HPI. Allergies:   Allergies   Allergen Reactions    Latex Hives and Rash    Macrobid [Nitrofurantoin Macrocrystal] Hives     No SOB or closing of throat noted when pt had reaction      Norco [Hydrocodone-Acetaminophen]      Makes her really sick to her stomach; hard to wake up with,it keeps her asleep    Other      Prolene suture    Ropivacaine     Tape Jacqlyn Brown Tape] Rash       Past Surgical History:  Past Surgical History:   Procedure Laterality Date    CHOLECYSTECTOMY  12/13/2018    DENTAL SURGERY      wisdom teeth    HYSTERECTOMY      SALPINGECTOMY      UPPER GASTROINTESTINAL ENDOSCOPY N/A 4/12/2021    EGD BIOPSY performed by Harish Stewart MD at [facility] ENDOSCOPY       Family History:  Family History   Problem Relation Age of Onset    Heart Disease Mother     Cancer Mother     Uterine Cancer Mother     Diabetes Father     High Blood Pressure Father     Coronary Art Dis Father     Heart Disease Brother     High Blood Pressure Brother     Learning Disabilities Brother     Diabetes Brother     Cancer Maternal Grandfather     Breast Cancer Maternal Great Grandmother        Social History:  Social History     Socioeconomic History    Marital status:      Spouse name: Not on file    Number of children: Not on file    Years of education: Not on file    Highest education level: Not on file   Occupational History    Not on file   Tobacco Use    Smoking status: Never Smoker    Smokeless tobacco: Never Used   Vaping Use    Vaping Use: Never used   Substance and Sexual Activity    Alcohol use: Yes     Comment: rarely    Drug use: Not Currently     Frequency: 7.0 times per week     Comment:  [facility] [facility]    Sexual activity: Yes     Partners: Male   Other Topics Concern    Not on file   Social History Narrative    Not on file     Social Determinants of Health     Financial Resource Strain: Low Risk     Difficulty of Paying Living Expenses: Not hard at all   Food Insecurity: No Food Insecurity    Worried About [facility] in the Last Year: Never true    [facility] in the Last Year: Never true   Transportation Needs:     Lack of Transportation (Medical): Not on file    Lack of Transportation (Non-Medical):  Not on file   Physical Activity:     Days of Exercise per Week: Not on file    Minutes of Exercise per Session: Not on file   Stress:     Feeling of Stress : Not on file   Social Connections:     Frequency of Communication with Friends and Family: Not on file    Frequency of Social Gatherings with Friends and Family: Not on file    Attends Congregation Services: Not on file    Active Member of Clubs or Organizations: Not on file    Attends Club or Organization Meetings: Not on file    Marital Status: Not on file   Intimate Partner Violence:     Fear of Current or Ex-Partner: Not on file    Emotionally Abused: Not on file    Physically Abused: Not on file    Sexually Abused: Not on file   Housing Stability:     Unable to Pay for Housing in the Last Year: Not on file    Number of [place] in the Last Year: Not on file    Unstable Housing in the Last Year: Not on file         OBJECTIVE:  Physical Exam   Ht 5' 3.25\" (1.607 m)   Wt 219 lb 8 oz (99.6 kg)   LMP 10/18/2018 (Approximate)   BMI 38.58 kg/m²      Weight Loss: - 32 lbs pre-op  NUTRITION DIAGNOSIS: Overweight / Obesity   Problem: Increased adiposity compared to reference standard or established norms   Etiology: Excess intake compared to output over time   S/S: Ht: 63.25\" Wt: 219.5 lbs BMI: 38.6    NUTRITION INTERVENTIONS:    Individualized treatment goals to address nutritiondiagnosis:   Instructed on 600-800 kcal diet for weight loss post-op   Provided fluid/activity logs, recipes and vitamins   Encouraged Physical activity as approved by physician    MONITORING/ EVALUATION/ PLAN:   Pt verbalized understanding of allmaterials covered   Pt asked pertinent questions throughout the session - expect compliance with nutrition guidelines presented   Provided pt with contact information should questions arise prior to next visit   Will f/u with pt in 2 weeks for weigh-in  Jarad Abarcajamila CASTLE, RDN, LD  1/31/2022

## 2022-02-01 ENCOUNTER — OFFICE VISIT (OUTPATIENT)
Dept: BARIATRICS/WEIGHT MGMT | Age: 27
End: 2022-02-01
Payer: MEDICAID

## 2022-02-01 ENCOUNTER — HOSPITAL ENCOUNTER (OUTPATIENT)
Age: 27
Discharge: HOME | End: 2022-02-01
Payer: MEDICAID

## 2022-02-01 VITALS — WEIGHT: 217.5 LBS | HEIGHT: 63 IN | BODY MASS INDEX: 38.54 KG/M2 | OXYGEN SATURATION: 100 % | HEART RATE: 70 BPM

## 2022-02-01 DIAGNOSIS — F41.9 ANXIETY: ICD-10-CM

## 2022-02-01 DIAGNOSIS — K21.00 GASTROESOPHAGEAL REFLUX DISEASE WITH ESOPHAGITIS WITHOUT HEMORRHAGE: ICD-10-CM

## 2022-02-01 DIAGNOSIS — M62.830 MUSCLE SPASM OF BACK: ICD-10-CM

## 2022-02-01 DIAGNOSIS — R19.7 DIARRHEA, UNSPECIFIED TYPE: ICD-10-CM

## 2022-02-01 DIAGNOSIS — Z91.040 LATEX ALLERGY: ICD-10-CM

## 2022-02-01 DIAGNOSIS — E66.01 MORBID OBESITY WITH BMI OF 40.0-44.9, ADULT (HCC): ICD-10-CM

## 2022-02-01 DIAGNOSIS — F32.0 CURRENT MILD EPISODE OF MAJOR DEPRESSIVE DISORDER WITHOUT PRIOR EPISODE (HCC): ICD-10-CM

## 2022-02-01 DIAGNOSIS — E66.01 MORBID OBESITY DUE TO EXCESS CALORIES (HCC): Primary | ICD-10-CM

## 2022-02-01 DIAGNOSIS — G43.009 MIGRAINE WITHOUT AURA AND WITHOUT STATUS MIGRAINOSUS, NOT INTRACTABLE: ICD-10-CM

## 2022-02-01 DIAGNOSIS — E66.09 NON MORBID OBESITY DUE TO EXCESS CALORIES: ICD-10-CM

## 2022-02-01 DIAGNOSIS — Z90.49 S/P LAPAROSCOPIC CHOLECYSTECTOMY: ICD-10-CM

## 2022-02-01 LAB
ALBUMIN SERPL-MCNC: 4.7 GM/DL (ref 3.4–5)
ALP BLD-CCNC: 69 IU/L (ref 40–128)
ALT SERPL-CCNC: 18 U/L (ref 10–40)
AMPHETAMINES: NEGATIVE
ANION GAP SERPL CALCULATED.3IONS-SCNC: 12 MMOL/L (ref 4–16)
AST SERPL-CCNC: 16 IU/L (ref 15–37)
BARBITURATE SCREEN URINE: NEGATIVE
BASOPHILS ABSOLUTE: 0 K/CU MM
BASOPHILS RELATIVE PERCENT: 0.4 % (ref 0–1)
BENZODIAZEPINE SCREEN, URINE: NEGATIVE
BILIRUB SERPL-MCNC: 0.3 MG/DL (ref 0–1)
BUN BLDV-MCNC: 17 MG/DL (ref 6–23)
CALCIUM SERPL-MCNC: 10.1 MG/DL (ref 8.3–10.6)
CANNABINOID SCREEN URINE: NEGATIVE
CHLORIDE BLD-SCNC: 101 MMOL/L (ref 99–110)
CO2: 22 MMOL/L (ref 21–32)
COCAINE METABOLITE: NEGATIVE
CREAT SERPL-MCNC: 0.7 MG/DL (ref 0.6–1.1)
DIFFERENTIAL TYPE: ABNORMAL
EOSINOPHILS ABSOLUTE: 0.1 K/CU MM
EOSINOPHILS RELATIVE PERCENT: 1.5 % (ref 0–3)
ESTIMATED AVERAGE GLUCOSE: 100 MG/DL
GFR AFRICAN AMERICAN: >60 ML/MIN/1.73M2
GFR NON-AFRICAN AMERICAN: >60 ML/MIN/1.73M2
GLUCOSE BLD-MCNC: 89 MG/DL (ref 70–99)
HBA1C MFR BLD: 5.1 % (ref 4.2–6.3)
HCT VFR BLD CALC: 42.1 % (ref 37–47)
HEMOGLOBIN: 13.7 GM/DL (ref 12.5–16)
IMMATURE NEUTROPHIL %: 0.4 % (ref 0–0.43)
LYMPHOCYTES ABSOLUTE: 2.3 K/CU MM
LYMPHOCYTES RELATIVE PERCENT: 24 % (ref 24–44)
MCH RBC QN AUTO: 28 PG (ref 27–31)
MCHC RBC AUTO-ENTMCNC: 32.5 % (ref 32–36)
MCV RBC AUTO: 85.9 FL (ref 78–100)
MONOCYTES ABSOLUTE: 0.6 K/CU MM
MONOCYTES RELATIVE PERCENT: 6.4 % (ref 0–4)
NUCLEATED RBC %: 0 %
OPIATES, URINE: NEGATIVE
OXYCODONE: NEGATIVE
PDW BLD-RTO: 13 % (ref 11.7–14.9)
PHENCYCLIDINE, URINE: NEGATIVE
PLATELET # BLD: 346 K/CU MM (ref 140–440)
PMV BLD AUTO: 11.4 FL (ref 7.5–11.1)
POTASSIUM SERPL-SCNC: 4.2 MMOL/L (ref 3.5–5.1)
RBC # BLD: 4.9 M/CU MM (ref 4.2–5.4)
SEGMENTED NEUTROPHILS ABSOLUTE COUNT: 6.4 K/CU MM
SEGMENTED NEUTROPHILS RELATIVE PERCENT: 67.3 % (ref 36–66)
SODIUM BLD-SCNC: 135 MMOL/L (ref 135–145)
TOTAL IMMATURE NEUTOROPHIL: 0.04 K/CU MM
TOTAL NUCLEATED RBC: 0 K/CU MM
TOTAL PROTEIN: 7.2 GM/DL (ref 6.4–8.2)
WBC # BLD: 9.4 K/CU MM (ref 4–10.5)

## 2022-02-01 PROCEDURE — 1036F TOBACCO NON-USER: CPT | Performed by: SURGERY

## 2022-02-01 PROCEDURE — 99213 OFFICE O/P EST LOW 20 MIN: CPT | Performed by: SURGERY

## 2022-02-01 PROCEDURE — 83036 HEMOGLOBIN GLYCOSYLATED A1C: CPT

## 2022-02-01 PROCEDURE — 85025 COMPLETE CBC W/AUTO DIFF WBC: CPT

## 2022-02-01 PROCEDURE — 80053 COMPREHEN METABOLIC PANEL: CPT

## 2022-02-01 PROCEDURE — G8484 FLU IMMUNIZE NO ADMIN: HCPCS | Performed by: SURGERY

## 2022-02-01 PROCEDURE — 80307 DRUG TEST PRSMV CHEM ANLYZR: CPT

## 2022-02-01 PROCEDURE — G8428 CUR MEDS NOT DOCUMENT: HCPCS | Performed by: SURGERY

## 2022-02-01 PROCEDURE — G8417 CALC BMI ABV UP PARAM F/U: HCPCS | Performed by: SURGERY

## 2022-02-01 PROCEDURE — 36415 COLL VENOUS BLD VENIPUNCTURE: CPT

## 2022-02-01 RX ORDER — ONDANSETRON 2 MG/ML
4 INJECTION INTRAMUSCULAR; INTRAVENOUS ONCE
Status: CANCELLED | OUTPATIENT
Start: 2022-02-01 | End: 2022-02-01

## 2022-02-01 RX ORDER — SODIUM CHLORIDE 0.9 % (FLUSH) 0.9 %
5-40 SYRINGE (ML) INJECTION PRN
Status: CANCELLED | OUTPATIENT
Start: 2022-02-01

## 2022-02-01 RX ORDER — SCOLOPAMINE TRANSDERMAL SYSTEM 1 MG/1
1 PATCH, EXTENDED RELEASE TRANSDERMAL ONCE
Status: CANCELLED | OUTPATIENT
Start: 2022-02-01 | End: 2022-02-01

## 2022-02-01 RX ORDER — TOPIRAMATE 100 MG/1
50 TABLET, FILM COATED ORAL 2 TIMES DAILY
COMMUNITY

## 2022-02-01 RX ORDER — SODIUM CHLORIDE, SODIUM LACTATE, POTASSIUM CHLORIDE, CALCIUM CHLORIDE 600; 310; 30; 20 MG/100ML; MG/100ML; MG/100ML; MG/100ML
INJECTION, SOLUTION INTRAVENOUS CONTINUOUS
Status: CANCELLED | OUTPATIENT
Start: 2022-02-01

## 2022-02-01 RX ORDER — HEPARIN SODIUM 5000 [USP'U]/ML
5000 INJECTION, SOLUTION INTRAVENOUS; SUBCUTANEOUS ONCE
Status: CANCELLED | OUTPATIENT
Start: 2022-02-01 | End: 2022-02-01

## 2022-02-01 RX ORDER — SODIUM CHLORIDE 9 MG/ML
25 INJECTION, SOLUTION INTRAVENOUS PRN
Status: CANCELLED | OUTPATIENT
Start: 2022-02-01

## 2022-02-01 RX ORDER — SODIUM CHLORIDE 0.9 % (FLUSH) 0.9 %
5-40 SYRINGE (ML) INJECTION EVERY 12 HOURS SCHEDULED
Status: CANCELLED | OUTPATIENT
Start: 2022-02-01

## 2022-02-01 ASSESSMENT — PATIENT HEALTH QUESTIONNAIRE - PHQ9
SUM OF ALL RESPONSES TO PHQ QUESTIONS 1-9: 0
SUM OF ALL RESPONSES TO PHQ9 QUESTIONS 1 & 2: 0
2. FEELING DOWN, DEPRESSED OR HOPELESS: 0
SUM OF ALL RESPONSES TO PHQ QUESTIONS 1-9: 0
SUM OF ALL RESPONSES TO PHQ QUESTIONS 1-9: 0
1. LITTLE INTEREST OR PLEASURE IN DOING THINGS: 0
SUM OF ALL RESPONSES TO PHQ QUESTIONS 1-9: 0

## 2022-02-01 NOTE — PROGRESS NOTES
BARIATRIC SURGERY OFFICE PROGRESS NOTE    SUBJECTIVE:    Patient presenting today referred from MIREYA Amos CNP, for   Chief Complaint   Patient presents with   [facility]     consent  sg   . Vitals:    02/01/22 1254   Pulse: 70   SpO2: 100%        BMI: Body mass index is 38.22 kg/m². Weight History: Wt Readings from Last 3 Encounters:   02/01/22 217 lb 8 oz (98.7 kg)   01/31/22 219 lb 8 oz (99.6 kg)   12/09/21 227 lb (103 kg)        If within 30 days of bariatric surgery date, have you been to the ED: [address] is a 27 y.o. female presenting in consent bariatric PRE-OP visit.     Total weight loss/gain:   -2 lbs since last visit  n/a lbs since surgery  -34 lbs since starting program    Changes in health since last visit: None, had bariatric surgery liver diet class prior to visit today    Pt tracking calories: yes    Pt exercising: yes    Pt taking vitamins: yes    Fluid intake: appropriate    Past Medical History:   Diagnosis Date    37 weeks gestation of pregnancy 06/28/2016    Anemia     H/O echocardiogram 05/21/2021    EF 55-60% no evidence of pericardial effusion no significant valvular regurgitation    History of postpartum hemorrhage, currently pregnant in third trimester 06/28/2016    Hx of cardiovascular stress test 05/07/2021    Normal study    Migraines     Preeclampsia 2014    Sciatica         Patient Active Problem List   Diagnosis    Diarrhea    Non morbid obesity due to excess calories    Migraine without aura and without status migrainosus, not intractable    Depression    Muscle spasm of back    Anxiety    Latex allergy    S/P laparoscopic cholecystectomy    Morbid obesity with BMI of 40.0-44.9, adult ([address]    Esophageal reflux    Morbid obesity due to excess calories [facility])       Past Surgical History:   Procedure Laterality Date    CHOLECYSTECTOMY  12/13/2018    DENTAL SURGERY      wisdom teeth    HYSTERECTOMY      SALPINGECTOMY  UPPER GASTROINTESTINAL ENDOSCOPY N/A 4/12/2021    EGD BIOPSY performed by Tamar Moctezuma MD at [facility] ENDOSCOPY       Current Outpatient Medications   Medication Sig Dispense Refill    topiramate (TOPAMAX) 100 MG tablet Take 100 mg by mouth 2 times daily      EPINEPHrine (EPIPEN 2-CLEOPATRA) 0.3 MG/0.3ML SOAJ injection Use as directed for allergic reaction 2 each 0    nystatin (MYCOSTATIN) 595103 UNIT/GM powder Apply 3 times daily. 1 Bottle 5    nystatin (MYCOSTATIN) 616998 UNIT/GM ointment Apply topically 2 times daily. 1 Tube 0    melatonin 5 MG TABS tablet       propranolol (INDERAL) 20 MG tablet       buPROPion (WELLBUTRIN SR) 100 MG extended release tablet       busPIRone (BUSPAR) 15 MG tablet       Multiple Vitamins-Minerals (THERAPEUTIC MULTIVITAMIN-MINERALS) tablet Take 1 tablet by mouth daily       sertraline (ZOLOFT) 50 MG tablet Take 1 tablet by mouth daily 30 tablet 5     No current facility-administered medications for this visit. Allergies   Allergen Reactions    Latex Hives and Rash    Macrobid [Nitrofurantoin Macrocrystal] Hives     No SOB or closing of throat noted when pt had reaction      Norco [Hydrocodone-Acetaminophen]      Makes her really sick to her stomach; hard to wake up with,it keeps her asleep    Other      Prolene suture    Ropivacaine     Tape [Adhesive Tape] Rash         Review of Systems   All other systems reviewed and are negative. OBJECTIVE:    Pulse 70   Ht 5' 3.25\" (1.607 m)   Wt 217 lb 8 oz (98.7 kg)   LMP 10/18/2018 (Approximate)   SpO2 100%   BMI 38.22 kg/m²      Physical Exam  Vitals reviewed. Constitutional:       General: She is not in acute distress. Appearance: She is obese. She is not ill-appearing, toxic-appearing or diaphoretic. HENT:      Head: Normocephalic and atraumatic. Right Ear: External ear normal.      Left Ear: External ear normal.      Nose: Nose normal.   Eyes:      General:         Right eye: No discharge. Left eye: No discharge. Extraocular Movements: Extraocular movements intact. Cardiovascular:      Rate and Rhythm: Normal rate. Pulmonary:      Effort: No respiratory distress. Abdominal:      Palpations: Abdomen is soft. Tenderness: There is no abdominal tenderness. Comments: Previous incisions well healed     Musculoskeletal:         General: No swelling. Cervical back: Normal range of motion. Skin:     General: Skin is warm. Neurological:      General: No focal deficit present. Mental Status: She is alert. Psychiatric:         Mood and Affect: Mood normal.         ASSESSMENT & PLAN:    1. Morbid obesity due to excess calories ([address]  -Consent obtained. Reviewed in detail with the patient and/or family the expected pre-operative, operative, and post-operative courses including risks, benefits, and alternatives to the procedure. The patient's questions were answered in detail and agreed to proceed with the procedure.   -Orders placed. -PAT orders placed.   -The patient was counseled at length about the risks of shayla Covid-19 during their perioperative period and any recovery window from their procedure. The patient was made aware that shayla Covid-19  may worsen their prognosis for recovering from their procedure  and lend to a higher morbidity and/or mortality risk. All material risks, benefits, and reasonable alternatives including postponing the procedure were discussed. The patient does wish to proceed with the procedure at this time. -reviewed importance of following pre op diet.   -Call with any questions, concerns, or issues whatsoever. As of current visit, regarding obesity-related co-morbid conditions:  DEB [] compliant [] no longer using [] resolved per sleep study; hypertension [] medications; hyperlipidemia [] medications; GERD [] medications; DM [] insulin [] non-insulin [] no meds       Patient was encouraged to track all PO intake. Calories should be approximately 1200, per discussion and plan with registered dietician. Protein intake is to be a minimum of 40-50 grams per day. Water drinking was encouraged with a goal of 64oz-128oz daily. Beverages are to be calorie free except for milk. Avoid soda and other carbonated beverages. Continue to increase level of physical activity. I spent 25 minutes with the patient face to face today and over 50% of the office visit today was spent in face to face counseling regarding diet and exercise, in preparation for her planned robotic sleeve gastrectomy. Discussed in length complying with the dietary recommendations, complying with the preoperative workup including dietary counseling , exercise physiologist counseling , and pre-operative optimization of pulmonologist  and cardiologist .    The patient expressed understanding and willingness to comply nicely; all questions and concerns addressed. No orders of the defined types were placed in this encounter. Orders Placed This Encounter   Procedures    CBC Auto Differential     Standing Status:   Standing     Number of Occurrences:   1    Comprehensive Metabolic Panel     Standing Status:   Standing     Number of Occurrences:   1    Urine Drug Screen     Standing Status:   Standing     Number of Occurrences:   1    Hemoglobin A1C     Standing Status:   Standing     Number of Occurrences:   1    Initiate PAT Protocol     Standing Status:   Future     Standing Expiration Date:   4/2/2022       Follow Up:  No follow-ups on file.     Karolyn Mc MD

## 2022-02-07 DIAGNOSIS — Z01.818 PRE-OP TESTING: Primary | ICD-10-CM

## 2022-02-08 NOTE — PROGRESS NOTES
Surgery @ [address] [place] [address] on 2/16/22, you will be called 2/15/22 with times               1. Do not eat or drink anything after midnight - unless instructed by your doctor prior to surgery. This includes                   no water, chewing gum or mints. 2. Follow your directions as prescribed by the doctor for your procedure and medications. Do not take any medications the morning of your surgery. 3. Check with your Doctor regarding stopping vitamins, supplements, blood thinners (Plavix, Coumadin, Lovenox, Effient, Pradaxa, Xarelto, Fragmin or                   other blood thinners) and follow their instructions. 4. Do not smoke, and do not drink any alcoholic beverages 24 hours prior to surgery. This includes NA Beer. 5. You may brush your teeth and gargle the morning of surgery. DO NOT SWALLOW WATER   6. You MUST make arrangements for a responsible adult to take you home after your surgery and be able to check on you every couple                   hours for the day. You will not be allowed to leave alone or drive yourself home. It is strongly suggested someone stay with you the first 24                   hrs. Your surgery will be cancelled if you do not have a ride home. 7. Please wear simple, loose fitting clothing to the hospital.  Raymundo Rousseau not bring valuables (money, credit cards, checkbooks, etc.) Do not wear any                   makeup (including no eye makeup) or nail polish on your fingers or toes. 8. DO NOT wear any jewelry or piercings on day of surgery. All body piercing jewelry must be removed. 9. If you have dentures, they will be removed before going to the OR; we will provide you a container. If you wear contact lenses or glasses,                  they will be removed; please bring a case for them. 10. If you  have a Living Will and Durable Power of  for Healthcare, please bring in a copy.            11. Please bring picture ID,  insurance card, paperwork from the doctors office    (H & P, Consent, & card for implantable devices). 12. Take a shower the night before or morning of your procedure with the soaps provided, do not apply any make-up, deodorant, lotion, oil or powder. 13. Wear a mask covering your nose & mouth when entering the hospital. Have your covid-19 test performed within 2-7 days of your                  Surgery-scheduled 2/11/22 in the office. Quarantine yourself after the test until after your surgery.

## 2022-02-11 ENCOUNTER — NURSE ONLY (OUTPATIENT)
Dept: SURGERY | Age: 27
End: 2022-02-11
Payer: MEDICAID

## 2022-02-11 ENCOUNTER — HOSPITAL ENCOUNTER (OUTPATIENT)
Age: 27
Setting detail: SPECIMEN
Discharge: HOME | End: 2022-02-11
Payer: MEDICAID

## 2022-02-11 DIAGNOSIS — Z01.818 PRE-OP TESTING: Primary | ICD-10-CM

## 2022-02-11 LAB
SARS-COV-2: DETECTED
SOURCE: ABNORMAL

## 2022-02-11 PROCEDURE — U0003 INFECTIOUS AGENT DETECTION BY NUCLEIC ACID (DNA OR RNA); SEVERE ACUTE RESPIRATORY SYNDROME CORONAVIRUS 2 (SARS-COV-2) (CORONAVIRUS DISEASE [COVID-19]), AMPLIFIED PROBE TECHNIQUE, MAKING USE OF HIGH THROUGHPUT TECHNOLOGIES AS DESCRIBED BY CMS-2020-01-R: HCPCS

## 2022-02-11 PROCEDURE — 99211 OFF/OP EST MAY X REQ PHY/QHP: CPT | Performed by: SURGERY

## 2022-02-11 PROCEDURE — U0005 INFEC AGEN DETEC AMPLI PROBE: HCPCS

## 2022-02-11 NOTE — PATIENT INSTRUCTIONS
Pre-Procedure COVID-19 Self Testing  Quarantine Instructions  Day of Surgery Instructions         What to do before my surgery:    All patients scheduled for elective surgery must test for COVID19 72-96 hours prior to the surgery date.  Pre-Procedure COVID-19 Self-Test will be scheduled for you by your provider.  You can receive your Pre-Procedure COVID-19 Self-Test at:  [facility]. [address] [facility], [facility], [facility], [address] [facility],Third Floor   If you do not have the COVID-19 test we will cancel or reschedule your procedure   Once you test you must quarantine at home until after your procedure with only your immediate family members or whoever lives with you.  If you must work during your quarantine period, we ask that you continue to practice social distancing, wear a mask that covers your mouth and nose and perform all hand hygiene as recommended by the CDC.  If you must go to the grocery, etc. and cannot get someone to do this for you please wear a mask that covers your mouth and nose and perform all hand hygiene as recommended by the CDC.  Your surgeon's office will notify you with any concerns about your test result. What can I expect on the day of surgery?  Arrive at the time the office or hospital staff tell you on the day of your procedure.  Wear a mask when entering the hospital.     A member of the hospital staff will take your temperature and ask you a few questions as you enter the building.  In abundance of caution for the safety of all our patients and staff, please follow all hospital visitor guidelines in place at the time of your procedure. The staff caring for you will stay in close communication with your loved one and keep them updated on progress.  Please provide a phone number for us to use when communicating with your family or ride home.    When you are ready to discharge, we will notify your family/person with you to bring the car to the front entrance. We will take you to them after you receive all of your discharge instructions.

## 2022-02-14 ENCOUNTER — TELEPHONE (OUTPATIENT)
Dept: BARIATRICS/WEIGHT MGMT | Age: 27
End: 2022-02-14

## 2022-02-14 NOTE — PROGRESS NOTES
2/14/22 - LM on patient's & spouse's phone of positive covid test 2/11/22. Quarantine for 10 days, then be symptom free for 3 days to be rescheduled. Your procedure on 2/16/22 is canceled and Dr. Eze Maldonado office will call to reschedule, please call me with any questions. Marco Spain in Dr. Eze Maldonado office notified.

## 2022-02-14 NOTE — TELEPHONE ENCOUNTER
Called patient she had positive COVID, however had a previous positive test on 1/2022. Scanned into chart. No need to reschedule anything. Gave patient info.

## 2022-02-14 NOTE — PROGRESS NOTES
Deepthi Ventura Anju  1995  27 y.o. Nino Reynoso is here for pre op weigh in. Patient was started on liver shrinking diet on *** and lost *** over 2 week slim fast diet and *** overall. Current Body mass index is Body mass index is 36.75 kg/m². [place]ighbours BMI started at ***    - All labs reviewed and questions answered. - Aware to continue diet until surgery and educated on post op diet stages. - Covid test on ***     SUBJECT MIKHAIL:    Chief Complaint   Patient presents with    Pre-op Exam     PRE SURG DRINK     Past Medical History:   Diagnosis Date    37 weeks gestation of pregnancy 06/28/2016    Anemia     COVID-19 01/01/2022    Positive test (see under media)    H/O echocardiogram 05/21/2021    EF 55-60% no evidence of pericardial effusion no significant valvular regurgitation    History of blood transfusion 2014    Received 5 units after childbirth    History of postpartum hemorrhage, currently pregnant in third trimester 06/28/2016    Hx of cardiovascular stress test 05/07/2021    Normal study    Migraines     Preeclampsia 2014    Sciatica      Past Surgical History:   Procedure Laterality Date    CHOLECYSTECTOMY  12/13/2018    DENTAL SURGERY      wisdom teeth    DILATION AND CURETTAGE OF UTERUS  2018    HYSTERECTOMY  10/2018    SALPINGECTOMY Bilateral 2016    UPPER GASTROINTESTINAL ENDOSCOPY N/A 4/12/2021    EGD BIOPSY performed by [facility]ares [facility] at [facility] ENDOSCOPY     Current Outpatient Medications   Medication Sig Dispense Refill    topiramate (TOPAMAX) 100 MG tablet Take 100 mg by mouth 2 times daily      EPINEPHrine (EPIPEN 2-CLEOPATRA) 0.3 MG/0.3ML SOAJ injection Use as directed for allergic reaction 2 each 0    nystatin (MYCOSTATIN) 154980 UNIT/GM powder Apply 3 times daily. 1 Bottle 5    sertraline (ZOLOFT) 50 MG tablet Take 1 tablet by mouth daily (Patient taking differently: Take 100 mg by mouth daily ) 30 tablet 5     No current facility-administered medications for this visit. Family History   Problem Relation Age of Onset    Heart Disease Mother     Cancer Mother     Uterine Cancer Mother     Diabetes Father     High Blood Pressure Father     Coronary Art Dis Father     Heart Disease Brother     High Blood Pressure Brother     Learning Disabilities Brother     Diabetes Brother     Cancer Maternal Grandfather     Breast Cancer Maternal Great Grandmother      Allergies   Allergen Reactions    Latex Hives and Rash    Macrobid [Nitrofurantoin Macrocrystal] Hives     No SOB or closing of throat noted when pt had reaction      Ropivacaine      \"felt like bugs were crawling on my skin\"    Norco [Hydrocodone-Acetaminophen]      Makes her really sick to her stomach; hard to wake up with,it keeps her asleep    Other Rash     Prolene suture    Tape [Adhesive Tape] Rash        Review of Systems    OBJECTIVE:     /76   Pulse 100   Ht 5' 3.25\" (1.607 m)   Wt 209 lb 1.6 oz (94.8 kg)   LMP 10/18/2018 (Approximate)   SpO2 100%   BMI 36.75 kg/m²   Wt Readings from Last 3 Encounters:   02/15/22 209 lb 1.6 oz (94.8 kg)   02/01/22 217 lb 8 oz (98.7 kg)   01/31/22 219 lb 8 oz (99.6 kg)       Physical Exam      ASSESSMENT/ PLAN:    There are no diagnoses linked to this encounter. - Scheduled for planned robot assisted *** on ***.   - Discussed pre surgical drink and when to take it. - Did well on 2 week pre op diet- down -*** lbs since starting and -*** overall  - BMI *** now and started at ***.   - Labs reviewed. - Discussed surgery and post op course along with diet stages following surgery. - Discharge checklist provided and reviewed  - Call with any questions or concerns. No orders of the defined types were placed in this encounter. No follow-ups on file.     Patrick Luevano, APRN - CNP

## 2022-02-15 ENCOUNTER — OFFICE VISIT (OUTPATIENT)
Dept: BARIATRICS/WEIGHT MGMT | Age: 27
End: 2022-02-15
Payer: MEDICAID

## 2022-02-15 ENCOUNTER — ANESTHESIA EVENT (OUTPATIENT)
Dept: OPERATING ROOM | Age: 27
DRG: 403 | End: 2022-02-15
Payer: MEDICAID

## 2022-02-15 VITALS
BODY MASS INDEX: 37.05 KG/M2 | HEIGHT: 63 IN | OXYGEN SATURATION: 100 % | HEART RATE: 100 BPM | DIASTOLIC BLOOD PRESSURE: 76 MMHG | WEIGHT: 209.1 LBS | SYSTOLIC BLOOD PRESSURE: 120 MMHG

## 2022-02-15 DIAGNOSIS — E66.01 MORBID OBESITY DUE TO EXCESS CALORIES (HCC): Primary | ICD-10-CM

## 2022-02-15 DIAGNOSIS — Z01.818 PRE-OP EVALUATION: ICD-10-CM

## 2022-02-15 PROCEDURE — G8417 CALC BMI ABV UP PARAM F/U: HCPCS | Performed by: NURSE PRACTITIONER

## 2022-02-15 PROCEDURE — G8427 DOCREV CUR MEDS BY ELIG CLIN: HCPCS | Performed by: NURSE PRACTITIONER

## 2022-02-15 PROCEDURE — 1036F TOBACCO NON-USER: CPT | Performed by: NURSE PRACTITIONER

## 2022-02-15 PROCEDURE — G8484 FLU IMMUNIZE NO ADMIN: HCPCS | Performed by: NURSE PRACTITIONER

## 2022-02-15 PROCEDURE — 99214 OFFICE O/P EST MOD 30 MIN: CPT | Performed by: NURSE PRACTITIONER

## 2022-02-15 ASSESSMENT — ENCOUNTER SYMPTOMS
EYES NEGATIVE: 1
ALLERGIC/IMMUNOLOGIC NEGATIVE: 1
GASTROINTESTINAL NEGATIVE: 1
RESPIRATORY NEGATIVE: 1

## 2022-02-15 NOTE — ANESTHESIA PRE PROCEDURE
Department of Anesthesiology  Preprocedure Note       Name:  Hilario Ceballos   Age:  27 y.o.  :  1995                                          MRN:  [id number]         Date:  2/15/2022      Surgeon: Linsey Quezada):  Fady Sandhu MD    Procedure: Procedure(s):  GASTRECTOMY SLEEVE LAPAROSCOPIC ROBOTIC POSS HIATAL HERNIA REPAIR  EGD ESOPHAGOGASTRODUODENOSCOPY    Medications prior to admission:   Prior to Admission medications    Medication Sig Start Date End Date Taking? Authorizing Provider   topiramate (TOPAMAX) 100 MG tablet Take 100 mg by mouth 2 times daily    Historical Provider, MD   EPINEPHrine (EPIPEN 2-CLEOPATRA) 0.3 MG/0.3ML SOAJ injection Use as directed for allergic reaction 21   Cherry Eduardo MD   nystatin (MYCOSTATIN) 303989 UNIT/GM powder Apply 3 times daily. 21   Cherry Eduardo MD   sertraline (ZOLOFT) 50 MG tablet Take 1 tablet by mouth daily  Patient taking differently: Take 100 mg by mouth daily  20   MIREYA Ceballos - CNP       Current medications:    Current Outpatient Medications   Medication Sig Dispense Refill    topiramate (TOPAMAX) 100 MG tablet Take 100 mg by mouth 2 times daily      EPINEPHrine (EPIPEN 2-CLEOPATRA) 0.3 MG/0.3ML SOAJ injection Use as directed for allergic reaction 2 each 0    nystatin (MYCOSTATIN) 769270 UNIT/GM powder Apply 3 times daily. 1 Bottle 5    sertraline (ZOLOFT) 50 MG tablet Take 1 tablet by mouth daily (Patient taking differently: Take 100 mg by mouth daily ) 30 tablet 5     No current facility-administered medications for this visit. Allergies:     Allergies   Allergen Reactions    Latex Hives and Rash    Macrobid [Nitrofurantoin Macrocrystal] Hives     No SOB or closing of throat noted when pt had reaction      Ropivacaine      \"felt like bugs were crawling on my skin\"    Norco [Hydrocodone-Acetaminophen]      Makes her really sick to her stomach; hard to wake up with,it keeps her asleep    Other Rash     Prolene suture    Tape Cassi Suarez Tape] Rash       Problem List:    Patient Active Problem List   Diagnosis Code    Diarrhea R19.7    Non morbid obesity due to excess calories E66.09    Migraine without aura and without status migrainosus, not intractable G43.009    Depression F32. A    Muscle spasm of back M62.830    Anxiety F41.9    Latex allergy Z91.040    S/P laparoscopic cholecystectomy Z90.49    Morbid obesity with BMI of 40.0-44.9, adult (HCC) E66.01, Z68.41    Esophageal reflux K21.9    Morbid obesity due to excess calories ([facility]) E66.01       Past Medical History:        Diagnosis Date    37 weeks gestation of pregnancy 06/28/2016    Anemia     COVID-19 01/01/2022    Positive test (see under media)    H/O echocardiogram 05/21/2021    EF 55-60% no evidence of pericardial effusion no significant valvular regurgitation    History of blood transfusion 2014    Received 5 units after childbirth    History of postpartum hemorrhage, currently pregnant in third trimester 06/28/2016    Hx of cardiovascular stress test 05/07/2021    Normal study    Migraines     Preeclampsia 2014    Sciatica        Past Surgical History:        Procedure Laterality Date    CHOLECYSTECTOMY  12/13/2018    DENTAL SURGERY      wisdom teeth    DILATION AND CURETTAGE OF UTERUS  2018    HYSTERECTOMY  10/2018    SALPINGECTOMY Bilateral 2016    UPPER GASTROINTESTINAL ENDOSCOPY N/A 4/12/2021    EGD BIOPSY performed by Leah Abreu MD at [address] [place] [address] History:    Social History     Tobacco Use    Smoking status: Never Smoker    Smokeless tobacco: Never Used   Substance Use Topics    Alcohol use: Yes     Comment: rarely                                Counseling given: Not Answered      Vital Signs (Current): There were no vitals filed for this visit.                                            BP Readings from Last 3 Encounters:   12/09/21 130/82   10/11/21 117/72   10/08/21 120/80       NPO Status: BMI:   Wt Readings from Last 3 Encounters:   02/01/22 217 lb 8 oz (98.7 kg)   01/31/22 219 lb 8 oz (99.6 kg)   12/09/21 227 lb (103 kg)     There is no height or weight on file to calculate BMI.    CBC:   Lab Results   Component Value Date    WBC 9.4 02/01/2022    RBC 4.90 02/01/2022    HGB 13.7 02/01/2022    HCT 42.1 02/01/2022    MCV 85.9 02/01/2022    RDW 13.0 02/01/2022     02/01/2022       CMP:   Lab Results   Component Value Date     02/01/2022    K 4.2 02/01/2022     02/01/2022    CO2 22 02/01/2022    BUN 17 02/01/2022    CREATININE 0.7 02/01/2022    GFRAA >60 02/01/2022    AGRATIO 1.6 12/09/2021    LABGLOM >60 02/01/2022    GLUCOSE 89 02/01/2022    PROT 7.2 02/01/2022    CALCIUM 10.1 02/01/2022    BILITOT 0.3 02/01/2022    ALKPHOS 69 02/01/2022    AST 16 02/01/2022    ALT 18 02/01/2022       POC Tests: No results for input(s): POCGLU, POCNA, POCK, POCCL, POCBUN, POCHEMO, POCHCT in the last 72 hours.     Coags: No results found for: PROTIME, INR, APTT    HCG (If Applicable):   Lab Results   Component Value Date    PREGTESTUR NEGATIVE 02/10/2018        ABGs: No results found for: PHART, PO2ART, LMR5YIA, YNL4IPP, BEART, A9JGJDWU     Type & Screen (If Applicable):  No results found for: LABABO, LABRH    Drug/Infectious Status (If Applicable):  No results found for: HIV, HEPCAB    COVID-19 Screening (If Applicable):   Lab Results   Component Value Date    COVID19 DETECTED 02/11/2022           Anesthesia Evaluation  Patient summary reviewed no history of anesthetic complications:   Airway: Mallampati: III  TM distance: >3 FB   Neck ROM: full  Mouth opening: > = 3 FB Dental: normal exam         Pulmonary:Negative Pulmonary ROS                              Cardiovascular:  Exercise tolerance: good (>4 METS),   (+) hypertension:,          Beta Blocker:  Not on Beta Blocker      ROS comment:  Summary   Left ventricular systolic function is normal with an ejection fraction of   55-60%. No evidence of diastolic dysfunction. No significant valvular regurgitation noted. Normal pulmonary artery pressure with a RVSP of 17mmHg. No evidence of pericardial effusion. Signature      ------------------------------------------------------------------   Electronically signed by Susan Stein MD   (Interpreting physician) on 05/21/2021 at 05:00 PM     Neuro/Psych:   (+) headaches: migraine headaches, depression/anxiety             GI/Hepatic/Renal:   (+) GERD:, morbid obesity          Endo/Other: Negative Endo/Other ROS                    Abdominal:             Vascular: negative vascular ROS. Other Findings:             Anesthesia Plan      general     ASA 2       Induction: intravenous. MIPS: Postoperative opioids intended and Prophylactic antiemetics administered. Anesthetic plan and risks discussed with patient. Plan discussed with CRNA. Pre Anesthesia Evaluation complete. Anesthesia plan, risks, benefits, alternatives, and personal involved discussed with patient. Patients and/or legal guardian verbalized an understanding  and agreed to proceed.   Sreedhar Corona DO  2/16/2022

## 2022-02-15 NOTE — PROGRESS NOTES
2/15/22 - Notified surgery @ [facility] on 2/16/22 @ 1200, arrival 1000. Patient verbalized understanding.

## 2022-02-15 NOTE — PROGRESS NOTES
Beverley Beckett Anju  1995  27 y.o. Mikie Nagy is here for pre op weigh in. Patient was started on liver shrinking diet on 1/31/22 and lost 8.4 over 2 week slim fast diet and 42.4 overall. Current Body mass index is Body mass index is 36.75 kg/m². Heladio Espinal BMI started at 44.20    - All labs reviewed and questions answered. - Aware to continue diet until surgery and educated on post op diet stages. - Covid test on 2/11/22- pt had covid infection last  Month; remains positive. Dr. Chiang Party made aware. SUBJECT MIKHAIL:    Chief Complaint   Patient presents with    Pre-op Exam     PRE SURG DRINK     Past Medical History:   Diagnosis Date    37 weeks gestation of pregnancy 06/28/2016    Anemia     COVID-19 01/01/2022    Positive test (see under media)    H/O echocardiogram 05/21/2021    EF 55-60% no evidence of pericardial effusion no significant valvular regurgitation    History of blood transfusion 2014    Received 5 units after childbirth    History of postpartum hemorrhage, currently pregnant in third trimester 06/28/2016    Hx of cardiovascular stress test 05/07/2021    Normal study    Migraines     Preeclampsia 2014    Sciatica      Past Surgical History:   Procedure Laterality Date    CHOLECYSTECTOMY  12/13/2018    DENTAL SURGERY      wisdom teeth    DILATION AND CURETTAGE OF UTERUS  2018    HYSTERECTOMY  10/2018    SALPINGECTOMY Bilateral 2016    UPPER GASTROINTESTINAL ENDOSCOPY N/A 4/12/2021    EGD BIOPSY performed by Rik Valentine MD at [facility] ENDOSCOPY     Current Outpatient Medications   Medication Sig Dispense Refill    topiramate (TOPAMAX) 100 MG tablet Take 100 mg by mouth 2 times daily      EPINEPHrine (EPIPEN 2-CLEOPATRA) 0.3 MG/0.3ML SOAJ injection Use as directed for allergic reaction 2 each 0    nystatin (MYCOSTATIN) 397854 UNIT/GM powder Apply 3 times daily.  1 Bottle 5    sertraline (ZOLOFT) 50 MG tablet Take 1 tablet by mouth daily (Patient taking differently: Take 100 mg by mouth daily ) 30 tablet 5     No current facility-administered medications for this visit. Family History   Problem Relation Age of Onset    Heart Disease Mother     Cancer Mother     Uterine Cancer Mother     Diabetes Father     High Blood Pressure Father     Coronary Art Dis Father     Heart Disease Brother     High Blood Pressure Brother     Learning Disabilities Brother     Diabetes Brother     Cancer Maternal Grandfather     Breast Cancer Maternal Great Grandmother      Allergies   Allergen Reactions    Latex Hives and Rash    Macrobid [Nitrofurantoin Macrocrystal] Hives     No SOB or closing of throat noted when pt had reaction      Ropivacaine      \"felt like bugs were crawling on my skin\"    Norco [Hydrocodone-Acetaminophen]      Makes her really sick to her stomach; hard to wake up with,it keeps her asleep    Other Rash     Prolene suture    Tape [Adhesive Tape] Rash        Review of Systems   Constitutional: Negative. HENT: Negative. Eyes: Negative. Respiratory: Negative. Cardiovascular: Negative. Gastrointestinal: Negative. Endocrine: Negative. Genitourinary: Negative. Musculoskeletal: Negative. Skin: Negative. Allergic/Immunologic: Negative. Neurological: Negative. Hematological: Negative. Psychiatric/Behavioral: Negative. OBJECTIVE:     /76   Pulse 100   Ht 5' 3.25\" (1.607 m)   Wt 209 lb 1.6 oz (94.8 kg)   LMP 10/18/2018 (Approximate)   SpO2 100%   BMI 36.75 kg/m²   Wt Readings from Last 3 Encounters:   02/15/22 209 lb 1.6 oz (94.8 kg)   02/01/22 217 lb 8 oz (98.7 kg)   01/31/22 219 lb 8 oz (99.6 kg)       Physical Exam  Constitutional:       Appearance: Normal appearance. HENT:      Head: Normocephalic. Nose: Nose normal.      Mouth/Throat:      Pharynx: Oropharynx is clear. Eyes:      Conjunctiva/sclera: Conjunctivae normal.      Pupils: Pupils are equal, round, and reactive to light.    Cardiovascular:      Rate and Rhythm: Normal rate. Pulses: Normal pulses. Pulmonary:      Effort: Pulmonary effort is normal.      Breath sounds: Normal breath sounds. Abdominal:      General: Bowel sounds are normal.   Musculoskeletal:         General: Normal range of motion. Cervical back: Normal range of motion. Skin:     General: Skin is warm and dry. Capillary Refill: Capillary refill takes less than 2 seconds. Neurological:      General: No focal deficit present. Mental Status: She is alert and oriented to person, place, and time. Psychiatric:         Mood and Affect: Mood normal.         Behavior: Behavior normal.           ASSESSMENT/ PLAN:    1. Morbid obesity due to excess calories ([address]  - Continue with surgery as planned    2. Pre-op evaluation  - Scheduled for planned robot assisted sleeve gastrectomy on 2/16/22.   - Discussed pre surgical drink and when to take it @ 8 pm tonight  - Did well on 2 week pre op diet- down -8.4 lbs since starting and -42.4 overall  - BMI 36.75 now and started at 44.20.   - Labs reviewed. ** still covid positive - had covid last month- Dr. Arlen Warren aware  - Discussed surgery and post op course along with diet stages following surgery. - Discharge checklist provided and reviewed  - Call with any questions or concerns. No orders of the defined types were placed in this encounter. Return in about 9 days (around 2/24/2022) for initial post op visit.     MIREYA Sawant - CNP

## 2022-02-16 ENCOUNTER — ANESTHESIA (OUTPATIENT)
Dept: OPERATING ROOM | Age: 27
DRG: 403 | End: 2022-02-16
Payer: MEDICAID

## 2022-02-16 ENCOUNTER — HOSPITAL ENCOUNTER (INPATIENT)
Age: 27
LOS: 1 days | Discharge: HOME | DRG: 403 | End: 2022-02-17
Attending: SURGERY | Admitting: SURGERY
Payer: MEDICAID

## 2022-02-16 VITALS
OXYGEN SATURATION: 100 % | TEMPERATURE: 97.4 F | DIASTOLIC BLOOD PRESSURE: 71 MMHG | SYSTOLIC BLOOD PRESSURE: 94 MMHG | RESPIRATION RATE: 76 BRPM

## 2022-02-16 DIAGNOSIS — E66.01 MORBID OBESITY DUE TO EXCESS CALORIES (HCC): Primary | ICD-10-CM

## 2022-02-16 DIAGNOSIS — E66.01 MORBID OBESITY (HCC): ICD-10-CM

## 2022-02-16 PROCEDURE — 88342 IMHCHEM/IMCYTCHM 1ST ANTB: CPT

## 2022-02-16 PROCEDURE — 2500000003 HC RX 250 WO HCPCS: Performed by: SURGERY

## 2022-02-16 PROCEDURE — 43775 LAP SLEEVE GASTRECTOMY: CPT | Performed by: SURGERY

## 2022-02-16 PROCEDURE — 2720000010 HC SURG SUPPLY STERILE: Performed by: SURGERY

## 2022-02-16 PROCEDURE — 3600000019 HC SURGERY ROBOT ADDTL 15MIN: Performed by: SURGERY

## 2022-02-16 PROCEDURE — 3700000000 HC ANESTHESIA ATTENDED CARE: Performed by: SURGERY

## 2022-02-16 PROCEDURE — G0378 HOSPITAL OBSERVATION PER HR: HCPCS

## 2022-02-16 PROCEDURE — 1200000000 HC SEMI PRIVATE

## 2022-02-16 PROCEDURE — 2580000003 HC RX 258: Performed by: SURGERY

## 2022-02-16 PROCEDURE — APPNB180 APP NON BILLABLE TIME > 60 MINS: Performed by: NURSE PRACTITIONER

## 2022-02-16 PROCEDURE — 96372 THER/PROPH/DIAG INJ SC/IM: CPT

## 2022-02-16 PROCEDURE — 2500000003 HC RX 250 WO HCPCS

## 2022-02-16 PROCEDURE — 6360000002 HC RX W HCPCS

## 2022-02-16 PROCEDURE — 3600000009 HC SURGERY ROBOT BASE: Performed by: SURGERY

## 2022-02-16 PROCEDURE — 43775 LAP SLEEVE GASTRECTOMY: CPT | Performed by: NURSE PRACTITIONER

## 2022-02-16 PROCEDURE — 94664 DEMO&/EVAL PT USE INHALER: CPT

## 2022-02-16 PROCEDURE — 7100000001 HC PACU RECOVERY - ADDTL 15 MIN: Performed by: SURGERY

## 2022-02-16 PROCEDURE — 2580000003 HC RX 258

## 2022-02-16 PROCEDURE — 7100000000 HC PACU RECOVERY - FIRST 15 MIN: Performed by: SURGERY

## 2022-02-16 PROCEDURE — 2709999900 HC NON-CHARGEABLE SUPPLY: Performed by: SURGERY

## 2022-02-16 PROCEDURE — 8E0W4CZ ROBOTIC ASSISTED PROCEDURE OF TRUNK REGION, PERCUTANEOUS ENDOSCOPIC APPROACH: ICD-10-PCS | Performed by: SURGERY

## 2022-02-16 PROCEDURE — S2900 ROBOTIC SURGICAL SYSTEM: HCPCS | Performed by: SURGERY

## 2022-02-16 PROCEDURE — 6360000002 HC RX W HCPCS: Performed by: SURGERY

## 2022-02-16 PROCEDURE — 0DB64Z3 EXCISION OF STOMACH, PERCUTANEOUS ENDOSCOPIC APPROACH, VERTICAL: ICD-10-PCS | Performed by: SURGERY

## 2022-02-16 PROCEDURE — 3700000001 HC ADD 15 MINUTES (ANESTHESIA): Performed by: SURGERY

## 2022-02-16 PROCEDURE — 6370000000 HC RX 637 (ALT 250 FOR IP): Performed by: SURGERY

## 2022-02-16 PROCEDURE — 88307 TISSUE EXAM BY PATHOLOGIST: CPT

## 2022-02-16 PROCEDURE — 94150 VITAL CAPACITY TEST: CPT

## 2022-02-16 PROCEDURE — 6360000002 HC RX W HCPCS: Performed by: ANESTHESIOLOGY

## 2022-02-16 RX ORDER — MIDAZOLAM HYDROCHLORIDE 1 MG/ML
INJECTION INTRAMUSCULAR; INTRAVENOUS PRN
Status: DISCONTINUED | OUTPATIENT
Start: 2022-02-16 | End: 2022-02-16 | Stop reason: SDUPTHER

## 2022-02-16 RX ORDER — ROCURONIUM BROMIDE 10 MG/ML
INJECTION, SOLUTION INTRAVENOUS PRN
Status: DISCONTINUED | OUTPATIENT
Start: 2022-02-16 | End: 2022-02-16 | Stop reason: SDUPTHER

## 2022-02-16 RX ORDER — SODIUM CHLORIDE 0.9 % (FLUSH) 0.9 %
5-40 SYRINGE (ML) INJECTION EVERY 12 HOURS SCHEDULED
Status: DISCONTINUED | OUTPATIENT
Start: 2022-02-16 | End: 2022-02-16 | Stop reason: HOSPADM

## 2022-02-16 RX ORDER — MORPHINE SULFATE 4 MG/ML
4 INJECTION, SOLUTION INTRAMUSCULAR; INTRAVENOUS
Status: DISCONTINUED | OUTPATIENT
Start: 2022-02-16 | End: 2022-02-17 | Stop reason: HOSPADM

## 2022-02-16 RX ORDER — GABAPENTIN 100 MG/1
100 CAPSULE ORAL 3 TIMES DAILY
Status: DISCONTINUED | OUTPATIENT
Start: 2022-02-16 | End: 2022-02-17 | Stop reason: HOSPADM

## 2022-02-16 RX ORDER — HYDROMORPHONE HCL 110MG/55ML
0.5 PATIENT CONTROLLED ANALGESIA SYRINGE INTRAVENOUS EVERY 5 MIN PRN
Status: DISCONTINUED | OUTPATIENT
Start: 2022-02-16 | End: 2022-02-16 | Stop reason: HOSPADM

## 2022-02-16 RX ORDER — ONDANSETRON 2 MG/ML
4 INJECTION INTRAMUSCULAR; INTRAVENOUS ONCE
Status: COMPLETED | OUTPATIENT
Start: 2022-02-16 | End: 2022-02-16

## 2022-02-16 RX ORDER — METOCLOPRAMIDE HYDROCHLORIDE 5 MG/ML
10 INJECTION INTRAMUSCULAR; INTRAVENOUS
Status: COMPLETED | OUTPATIENT
Start: 2022-02-16 | End: 2022-02-16

## 2022-02-16 RX ORDER — MORPHINE SULFATE 4 MG/ML
2 INJECTION, SOLUTION INTRAMUSCULAR; INTRAVENOUS
Status: DISCONTINUED | OUTPATIENT
Start: 2022-02-16 | End: 2022-02-17 | Stop reason: HOSPADM

## 2022-02-16 RX ORDER — HYDRALAZINE HYDROCHLORIDE 20 MG/ML
10 INJECTION INTRAMUSCULAR; INTRAVENOUS
Status: DISCONTINUED | OUTPATIENT
Start: 2022-02-16 | End: 2022-02-16 | Stop reason: HOSPADM

## 2022-02-16 RX ORDER — FENTANYL CITRATE 50 UG/ML
50 INJECTION, SOLUTION INTRAMUSCULAR; INTRAVENOUS EVERY 5 MIN PRN
Status: DISCONTINUED | OUTPATIENT
Start: 2022-02-16 | End: 2022-02-16 | Stop reason: HOSPADM

## 2022-02-16 RX ORDER — ACETAMINOPHEN 325 MG/1
325 TABLET ORAL EVERY 4 HOURS
Status: DISCONTINUED | OUTPATIENT
Start: 2022-02-16 | End: 2022-02-17 | Stop reason: HOSPADM

## 2022-02-16 RX ORDER — SODIUM CHLORIDE 9 MG/ML
25 INJECTION, SOLUTION INTRAVENOUS PRN
Status: DISCONTINUED | OUTPATIENT
Start: 2022-02-16 | End: 2022-02-16 | Stop reason: HOSPADM

## 2022-02-16 RX ORDER — LABETALOL HYDROCHLORIDE 5 MG/ML
10 INJECTION, SOLUTION INTRAVENOUS
Status: DISCONTINUED | OUTPATIENT
Start: 2022-02-16 | End: 2022-02-16 | Stop reason: HOSPADM

## 2022-02-16 RX ORDER — PROPOFOL 10 MG/ML
INJECTION, EMULSION INTRAVENOUS PRN
Status: DISCONTINUED | OUTPATIENT
Start: 2022-02-16 | End: 2022-02-16 | Stop reason: SDUPTHER

## 2022-02-16 RX ORDER — SODIUM CHLORIDE, SODIUM LACTATE, POTASSIUM CHLORIDE, CALCIUM CHLORIDE 600; 310; 30; 20 MG/100ML; MG/100ML; MG/100ML; MG/100ML
INJECTION, SOLUTION INTRAVENOUS CONTINUOUS
Status: DISCONTINUED | OUTPATIENT
Start: 2022-02-16 | End: 2022-02-17 | Stop reason: HOSPADM

## 2022-02-16 RX ORDER — HYDROCODONE BITARTRATE AND ACETAMINOPHEN 5; 325 MG/1; MG/1
1 TABLET ORAL EVERY 6 HOURS PRN
Status: DISCONTINUED | OUTPATIENT
Start: 2022-02-16 | End: 2022-02-17 | Stop reason: HOSPADM

## 2022-02-16 RX ORDER — ONDANSETRON 2 MG/ML
4 INJECTION INTRAMUSCULAR; INTRAVENOUS EVERY 6 HOURS PRN
Status: DISCONTINUED | OUTPATIENT
Start: 2022-02-16 | End: 2022-02-17 | Stop reason: HOSPADM

## 2022-02-16 RX ORDER — KETOROLAC TROMETHAMINE 30 MG/ML
30 INJECTION, SOLUTION INTRAMUSCULAR; INTRAVENOUS EVERY 6 HOURS
Status: DISCONTINUED | OUTPATIENT
Start: 2022-02-16 | End: 2022-02-17 | Stop reason: HOSPADM

## 2022-02-16 RX ORDER — MEPERIDINE HYDROCHLORIDE 25 MG/ML
12.5 INJECTION INTRAMUSCULAR; INTRAVENOUS; SUBCUTANEOUS EVERY 5 MIN PRN
Status: DISCONTINUED | OUTPATIENT
Start: 2022-02-16 | End: 2022-02-16 | Stop reason: HOSPADM

## 2022-02-16 RX ORDER — POTASSIUM CHLORIDE 20 MEQ/1
40 TABLET, EXTENDED RELEASE ORAL PRN
Status: DISCONTINUED | OUTPATIENT
Start: 2022-02-16 | End: 2022-02-17 | Stop reason: HOSPADM

## 2022-02-16 RX ORDER — SCOLOPAMINE TRANSDERMAL SYSTEM 1 MG/1
1 PATCH, EXTENDED RELEASE TRANSDERMAL ONCE
Status: DISCONTINUED | OUTPATIENT
Start: 2022-02-16 | End: 2022-02-16

## 2022-02-16 RX ORDER — FENTANYL CITRATE 50 UG/ML
INJECTION, SOLUTION INTRAMUSCULAR; INTRAVENOUS PRN
Status: DISCONTINUED | OUTPATIENT
Start: 2022-02-16 | End: 2022-02-16 | Stop reason: SDUPTHER

## 2022-02-16 RX ORDER — CEFAZOLIN SODIUM 2 G/100ML
2000 INJECTION, SOLUTION INTRAVENOUS
Status: COMPLETED | OUTPATIENT
Start: 2022-02-16 | End: 2022-02-16

## 2022-02-16 RX ORDER — LIDOCAINE HYDROCHLORIDE 20 MG/ML
INJECTION, SOLUTION INTRAVENOUS PRN
Status: DISCONTINUED | OUTPATIENT
Start: 2022-02-16 | End: 2022-02-16 | Stop reason: SDUPTHER

## 2022-02-16 RX ORDER — HEPARIN SODIUM 5000 [USP'U]/ML
5000 INJECTION, SOLUTION INTRAVENOUS; SUBCUTANEOUS ONCE
Status: COMPLETED | OUTPATIENT
Start: 2022-02-16 | End: 2022-02-16

## 2022-02-16 RX ORDER — SODIUM CHLORIDE, SODIUM LACTATE, POTASSIUM CHLORIDE, CALCIUM CHLORIDE 600; 310; 30; 20 MG/100ML; MG/100ML; MG/100ML; MG/100ML
INJECTION, SOLUTION INTRAVENOUS CONTINUOUS PRN
Status: DISCONTINUED | OUTPATIENT
Start: 2022-02-16 | End: 2022-02-16 | Stop reason: SDUPTHER

## 2022-02-16 RX ORDER — SODIUM CHLORIDE 0.9 % (FLUSH) 0.9 %
5-40 SYRINGE (ML) INJECTION PRN
Status: DISCONTINUED | OUTPATIENT
Start: 2022-02-16 | End: 2022-02-16 | Stop reason: HOSPADM

## 2022-02-16 RX ORDER — MAGNESIUM SULFATE IN WATER 40 MG/ML
2000 INJECTION, SOLUTION INTRAVENOUS PRN
Status: DISCONTINUED | OUTPATIENT
Start: 2022-02-16 | End: 2022-02-17 | Stop reason: HOSPADM

## 2022-02-16 RX ORDER — DEXAMETHASONE SODIUM PHOSPHATE 4 MG/ML
INJECTION, SOLUTION INTRA-ARTICULAR; INTRALESIONAL; INTRAMUSCULAR; INTRAVENOUS; SOFT TISSUE PRN
Status: DISCONTINUED | OUTPATIENT
Start: 2022-02-16 | End: 2022-02-16 | Stop reason: SDUPTHER

## 2022-02-16 RX ORDER — SODIUM CHLORIDE 9 MG/ML
25 INJECTION, SOLUTION INTRAVENOUS PRN
Status: DISCONTINUED | OUTPATIENT
Start: 2022-02-16 | End: 2022-02-17 | Stop reason: HOSPADM

## 2022-02-16 RX ORDER — SODIUM CHLORIDE 0.9 % (FLUSH) 0.9 %
10 SYRINGE (ML) INJECTION PRN
Status: DISCONTINUED | OUTPATIENT
Start: 2022-02-16 | End: 2022-02-17 | Stop reason: HOSPADM

## 2022-02-16 RX ORDER — BUPIVACAINE HYDROCHLORIDE 5 MG/ML
INJECTION, SOLUTION EPIDURAL; INTRACAUDAL
Status: COMPLETED | OUTPATIENT
Start: 2022-02-16 | End: 2022-02-16

## 2022-02-16 RX ORDER — SODIUM CHLORIDE, SODIUM LACTATE, POTASSIUM CHLORIDE, CALCIUM CHLORIDE 600; 310; 30; 20 MG/100ML; MG/100ML; MG/100ML; MG/100ML
INJECTION, SOLUTION INTRAVENOUS CONTINUOUS
Status: DISCONTINUED | OUTPATIENT
Start: 2022-02-16 | End: 2022-02-16

## 2022-02-16 RX ORDER — ONDANSETRON 2 MG/ML
INJECTION INTRAMUSCULAR; INTRAVENOUS PRN
Status: DISCONTINUED | OUTPATIENT
Start: 2022-02-16 | End: 2022-02-16 | Stop reason: SDUPTHER

## 2022-02-16 RX ORDER — DIPHENHYDRAMINE HYDROCHLORIDE 50 MG/ML
12.5 INJECTION INTRAMUSCULAR; INTRAVENOUS
Status: COMPLETED | OUTPATIENT
Start: 2022-02-16 | End: 2022-02-16

## 2022-02-16 RX ORDER — KETOROLAC TROMETHAMINE 30 MG/ML
INJECTION, SOLUTION INTRAMUSCULAR; INTRAVENOUS PRN
Status: DISCONTINUED | OUTPATIENT
Start: 2022-02-16 | End: 2022-02-16 | Stop reason: SDUPTHER

## 2022-02-16 RX ORDER — CEFAZOLIN SODIUM 2 G/100ML
2000 INJECTION, SOLUTION INTRAVENOUS EVERY 8 HOURS
Status: COMPLETED | OUTPATIENT
Start: 2022-02-16 | End: 2022-02-17

## 2022-02-16 RX ORDER — METOCLOPRAMIDE HYDROCHLORIDE 5 MG/ML
10 INJECTION INTRAMUSCULAR; INTRAVENOUS EVERY 6 HOURS PRN
Status: DISCONTINUED | OUTPATIENT
Start: 2022-02-16 | End: 2022-02-17 | Stop reason: HOSPADM

## 2022-02-16 RX ORDER — SODIUM CHLORIDE 0.9 % (FLUSH) 0.9 %
5-40 SYRINGE (ML) INJECTION EVERY 12 HOURS SCHEDULED
Status: DISCONTINUED | OUTPATIENT
Start: 2022-02-16 | End: 2022-02-17 | Stop reason: HOSPADM

## 2022-02-16 RX ORDER — POTASSIUM CHLORIDE 7.45 MG/ML
10 INJECTION INTRAVENOUS PRN
Status: DISCONTINUED | OUTPATIENT
Start: 2022-02-16 | End: 2022-02-17 | Stop reason: HOSPADM

## 2022-02-16 RX ADMIN — CEFAZOLIN SODIUM 2000 MG: 2 INJECTION, SOLUTION INTRAVENOUS at 12:15

## 2022-02-16 RX ADMIN — SODIUM CHLORIDE, POTASSIUM CHLORIDE, SODIUM LACTATE AND CALCIUM CHLORIDE: 600; 310; 30; 20 INJECTION, SOLUTION INTRAVENOUS at 11:45

## 2022-02-16 RX ADMIN — FAMOTIDINE 20 MG: 10 INJECTION, SOLUTION INTRAVENOUS at 20:32

## 2022-02-16 RX ADMIN — CEFAZOLIN SODIUM 2000 MG: 2 INJECTION, SOLUTION INTRAVENOUS at 20:34

## 2022-02-16 RX ADMIN — ACETAMINOPHEN 325 MG: 325 TABLET ORAL at 20:23

## 2022-02-16 RX ADMIN — SODIUM CHLORIDE, POTASSIUM CHLORIDE, SODIUM LACTATE AND CALCIUM CHLORIDE: 600; 310; 30; 20 INJECTION, SOLUTION INTRAVENOUS at 10:50

## 2022-02-16 RX ADMIN — PROPOFOL 150 MG: 10 INJECTION, EMULSION INTRAVENOUS at 12:05

## 2022-02-16 RX ADMIN — MIDAZOLAM 2 MG: 1 INJECTION INTRAMUSCULAR; INTRAVENOUS at 11:50

## 2022-02-16 RX ADMIN — ROCURONIUM BROMIDE 50 MG: 50 INJECTION, SOLUTION INTRAVENOUS at 12:05

## 2022-02-16 RX ADMIN — FENTANYL CITRATE 50 MCG: 50 INJECTION, SOLUTION INTRAMUSCULAR; INTRAVENOUS at 12:05

## 2022-02-16 RX ADMIN — KETOROLAC TROMETHAMINE 30 MG: 30 INJECTION, SOLUTION INTRAMUSCULAR; INTRAVENOUS at 13:09

## 2022-02-16 RX ADMIN — METOCLOPRAMIDE 10 MG: 5 INJECTION, SOLUTION INTRAMUSCULAR; INTRAVENOUS at 13:45

## 2022-02-16 RX ADMIN — DIPHENHYDRAMINE HYDROCHLORIDE 12.5 MG: 50 INJECTION, SOLUTION INTRAMUSCULAR; INTRAVENOUS at 14:16

## 2022-02-16 RX ADMIN — DEXAMETHASONE SODIUM PHOSPHATE 8 MG: 4 INJECTION, SOLUTION INTRAMUSCULAR; INTRAVENOUS at 12:15

## 2022-02-16 RX ADMIN — SUGAMMADEX 200 MG: 100 INJECTION, SOLUTION INTRAVENOUS at 13:10

## 2022-02-16 RX ADMIN — SODIUM CHLORIDE, POTASSIUM CHLORIDE, SODIUM LACTATE AND CALCIUM CHLORIDE: 600; 310; 30; 20 INJECTION, SOLUTION INTRAVENOUS at 13:39

## 2022-02-16 RX ADMIN — ONDANSETRON 4 MG: 2 INJECTION INTRAMUSCULAR; INTRAVENOUS at 10:51

## 2022-02-16 RX ADMIN — ONDANSETRON 4 MG: 2 INJECTION INTRAMUSCULAR; INTRAVENOUS at 13:09

## 2022-02-16 RX ADMIN — ROCURONIUM BROMIDE 20 MG: 50 INJECTION, SOLUTION INTRAVENOUS at 12:54

## 2022-02-16 RX ADMIN — LIDOCAINE HYDROCHLORIDE 100 MG: 20 INJECTION, SOLUTION INTRAVENOUS at 12:05

## 2022-02-16 RX ADMIN — HYDROMORPHONE HYDROCHLORIDE 0.5 MG: 2 INJECTION INTRAMUSCULAR; INTRAVENOUS; SUBCUTANEOUS at 13:50

## 2022-02-16 RX ADMIN — HEPARIN SODIUM 5000 UNITS: 5000 INJECTION, SOLUTION INTRAVENOUS; SUBCUTANEOUS at 10:51

## 2022-02-16 RX ADMIN — ONDANSETRON 4 MG: 2 INJECTION INTRAMUSCULAR; INTRAVENOUS at 18:03

## 2022-02-16 RX ADMIN — HYDROMORPHONE HYDROCHLORIDE 0.5 MG: 2 INJECTION INTRAMUSCULAR; INTRAVENOUS; SUBCUTANEOUS at 13:45

## 2022-02-16 RX ADMIN — ENOXAPARIN SODIUM 40 MG: 100 INJECTION SUBCUTANEOUS at 20:33

## 2022-02-16 RX ADMIN — FENTANYL CITRATE 50 MCG: 50 INJECTION, SOLUTION INTRAMUSCULAR; INTRAVENOUS at 13:17

## 2022-02-16 RX ADMIN — KETOROLAC TROMETHAMINE 30 MG: 30 INJECTION, SOLUTION INTRAMUSCULAR; INTRAVENOUS at 20:30

## 2022-02-16 RX ADMIN — GABAPENTIN 100 MG: 100 CAPSULE ORAL at 20:35

## 2022-02-16 ASSESSMENT — PULMONARY FUNCTION TESTS
PIF_VALUE: 21
PIF_VALUE: 22
PIF_VALUE: 1
PIF_VALUE: 23
PIF_VALUE: 21
PIF_VALUE: 22
PIF_VALUE: 0
PIF_VALUE: 22
PIF_VALUE: 18
PIF_VALUE: 15
PIF_VALUE: 23
PIF_VALUE: 22
PIF_VALUE: 0
PIF_VALUE: 22
PIF_VALUE: 21
PIF_VALUE: 21
PIF_VALUE: 4
PIF_VALUE: 21
PIF_VALUE: 22
PIF_VALUE: 22
PIF_VALUE: 20
PIF_VALUE: 17
PIF_VALUE: 17
PIF_VALUE: 21
PIF_VALUE: 22
PIF_VALUE: 17
PIF_VALUE: 21
PIF_VALUE: 16
PIF_VALUE: 21
PIF_VALUE: 5
PIF_VALUE: 22
PIF_VALUE: 17
PIF_VALUE: 21
PIF_VALUE: 22
PIF_VALUE: 22
PIF_VALUE: 16
PIF_VALUE: 21
PIF_VALUE: 1
PIF_VALUE: 21
PIF_VALUE: 20
PIF_VALUE: 20
PIF_VALUE: 18
PIF_VALUE: 21
PIF_VALUE: 21
PIF_VALUE: 12
PIF_VALUE: 19
PIF_VALUE: 22
PIF_VALUE: 19
PIF_VALUE: 22
PIF_VALUE: 1
PIF_VALUE: 17
PIF_VALUE: 11
PIF_VALUE: 19
PIF_VALUE: 21
PIF_VALUE: 21
PIF_VALUE: 18
PIF_VALUE: 18
PIF_VALUE: 11
PIF_VALUE: 29
PIF_VALUE: 18
PIF_VALUE: 2
PIF_VALUE: 21
PIF_VALUE: 20
PIF_VALUE: 21
PIF_VALUE: 0
PIF_VALUE: 22
PIF_VALUE: 18
PIF_VALUE: 19
PIF_VALUE: 17
PIF_VALUE: 4
PIF_VALUE: 17
PIF_VALUE: 21

## 2022-02-16 ASSESSMENT — PAIN DESCRIPTION - LOCATION
LOCATION: ABDOMEN

## 2022-02-16 ASSESSMENT — PAIN DESCRIPTION - DESCRIPTORS
DESCRIPTORS: ACHING;DISCOMFORT

## 2022-02-16 ASSESSMENT — PAIN DESCRIPTION - FREQUENCY
FREQUENCY: CONTINUOUS
FREQUENCY: INTERMITTENT
FREQUENCY: CONTINUOUS
FREQUENCY: INTERMITTENT

## 2022-02-16 ASSESSMENT — PAIN SCALES - GENERAL
PAINLEVEL_OUTOF10: 0
PAINLEVEL_OUTOF10: 5
PAINLEVEL_OUTOF10: 6

## 2022-02-16 ASSESSMENT — PAIN DESCRIPTION - PROGRESSION
CLINICAL_PROGRESSION: GRADUALLY IMPROVING
CLINICAL_PROGRESSION: GRADUALLY IMPROVING

## 2022-02-16 ASSESSMENT — PAIN DESCRIPTION - PAIN TYPE
TYPE: SURGICAL PAIN

## 2022-02-16 NOTE — OP NOTE
completing the division of the greater curvature attachments, a 38 [place] bougie was passed toward the pylorus. Sequential 60mm firings of a da Amelia SureForm stapler were performed (2 green and 5 blue), taking care not to overly narrow the pouch, specifically at the incisura. At the angle of His, an outpuching of tissue was left to ensure that the staple line did not encroach on the esophagus. An intraoperative leak test was performed which demonstrated no leak. The robot was undocked and moved away from the operating room table. The 12 mm port site was then widened with a Marta clamp and the gastric sleeve specimen was removed intact and passed off for permanent pathology. The extraction site was closed with several trans-fascial 0-Vicryl sutures. All skin incisions were closed using a 4-0 Vicryl in a subcuticular fashion. Dermabond was used to cover the incisions. The patient was extubated and moved to the recovery room in stable condition. At the end of the case, the needle, instrument, and sponge counts were correct x 2. Dr. Dianna Chavez was present, scrubbed, and supervised the entire case. Dr. Dianna Chavez personally informed the family of the outcome of the procedure.     Chapo Alfaro MD

## 2022-02-16 NOTE — PROGRESS NOTES
1322 - transferred from OR on bed, monitor applied, alarms on and verified, bedside handoff provided by Stephanie Lowry RN and Sha Sun  1330 - turned, repositioned, linens straightened, patient tolerated well  1345 - medicated for complaint of nausea and surgical pain/discomfort  1422 - phase one care complete; awaiting room assignment to transfer patient to inpatient unit

## 2022-02-16 NOTE — PROGRESS NOTES
Patient instructed and educated on [employer]. Patient able to do 900ml. Vital capacity. Patient's goal is 1200ml.  Electronically signed by Don Linda RCP on 2/16/2022 at 4:48 PM Statement Selected

## 2022-02-17 VITALS
OXYGEN SATURATION: 100 % | RESPIRATION RATE: 16 BRPM | TEMPERATURE: 97 F | DIASTOLIC BLOOD PRESSURE: 71 MMHG | HEART RATE: 72 BPM | SYSTOLIC BLOOD PRESSURE: 114 MMHG | WEIGHT: 212 LBS | HEIGHT: 63 IN | BODY MASS INDEX: 37.56 KG/M2

## 2022-02-17 LAB
ALBUMIN SERPL-MCNC: 3.9 GM/DL (ref 3.4–5)
ALP BLD-CCNC: 58 IU/L (ref 40–128)
ALT SERPL-CCNC: 28 U/L (ref 10–40)
ANION GAP SERPL CALCULATED.3IONS-SCNC: 9 MMOL/L (ref 4–16)
AST SERPL-CCNC: 25 IU/L (ref 15–37)
BASOPHILS ABSOLUTE: 0 K/CU MM
BASOPHILS RELATIVE PERCENT: 0.2 % (ref 0–1)
BILIRUB SERPL-MCNC: 0.2 MG/DL (ref 0–1)
BUN BLDV-MCNC: 9 MG/DL (ref 6–23)
CALCIUM SERPL-MCNC: 8.8 MG/DL (ref 8.3–10.6)
CHLORIDE BLD-SCNC: 106 MMOL/L (ref 99–110)
CO2: 23 MMOL/L (ref 21–32)
CREAT SERPL-MCNC: 0.7 MG/DL (ref 0.6–1.1)
DIFFERENTIAL TYPE: ABNORMAL
EOSINOPHILS ABSOLUTE: 0 K/CU MM
EOSINOPHILS RELATIVE PERCENT: 0.1 % (ref 0–3)
GFR AFRICAN AMERICAN: >60 ML/MIN/1.73M2
GFR NON-AFRICAN AMERICAN: >60 ML/MIN/1.73M2
GLUCOSE BLD-MCNC: 112 MG/DL (ref 70–99)
HCT VFR BLD CALC: 34.8 % (ref 37–47)
HEMOGLOBIN: 11.8 GM/DL (ref 12.5–16)
IMMATURE NEUTROPHIL %: 0.4 % (ref 0–0.43)
LYMPHOCYTES ABSOLUTE: 1.5 K/CU MM
LYMPHOCYTES RELATIVE PERCENT: 13.6 % (ref 24–44)
MCH RBC QN AUTO: 28.5 PG (ref 27–31)
MCHC RBC AUTO-ENTMCNC: 33.9 % (ref 32–36)
MCV RBC AUTO: 84.1 FL (ref 78–100)
MONOCYTES ABSOLUTE: 0.9 K/CU MM
MONOCYTES RELATIVE PERCENT: 7.8 % (ref 0–4)
NUCLEATED RBC %: 0 %
PDW BLD-RTO: 13.3 % (ref 11.7–14.9)
PLATELET # BLD: 314 K/CU MM (ref 140–440)
PMV BLD AUTO: 12 FL (ref 7.5–11.1)
POTASSIUM SERPL-SCNC: 4 MMOL/L (ref 3.5–5.1)
RBC # BLD: 4.14 M/CU MM (ref 4.2–5.4)
SEGMENTED NEUTROPHILS ABSOLUTE COUNT: 8.6 K/CU MM
SEGMENTED NEUTROPHILS RELATIVE PERCENT: 77.9 % (ref 36–66)
SODIUM BLD-SCNC: 138 MMOL/L (ref 135–145)
TOTAL IMMATURE NEUTOROPHIL: 0.04 K/CU MM
TOTAL NUCLEATED RBC: 0 K/CU MM
TOTAL PROTEIN: 6.5 GM/DL (ref 6.4–8.2)
WBC # BLD: 11 K/CU MM (ref 4–10.5)

## 2022-02-17 PROCEDURE — 2580000003 HC RX 258: Performed by: SURGERY

## 2022-02-17 PROCEDURE — 99024 POSTOP FOLLOW-UP VISIT: CPT | Performed by: NURSE PRACTITIONER

## 2022-02-17 PROCEDURE — 2500000003 HC RX 250 WO HCPCS: Performed by: SURGERY

## 2022-02-17 PROCEDURE — G0378 HOSPITAL OBSERVATION PER HR: HCPCS

## 2022-02-17 PROCEDURE — 96372 THER/PROPH/DIAG INJ SC/IM: CPT

## 2022-02-17 PROCEDURE — 6370000000 HC RX 637 (ALT 250 FOR IP): Performed by: SURGERY

## 2022-02-17 PROCEDURE — 96374 THER/PROPH/DIAG INJ IV PUSH: CPT

## 2022-02-17 PROCEDURE — 96376 TX/PRO/DX INJ SAME DRUG ADON: CPT

## 2022-02-17 PROCEDURE — APPNB30 APP NON BILLABLE TIME 0-30 MINS: Performed by: NURSE PRACTITIONER

## 2022-02-17 PROCEDURE — 80053 COMPREHEN METABOLIC PANEL: CPT

## 2022-02-17 PROCEDURE — 96375 TX/PRO/DX INJ NEW DRUG ADDON: CPT

## 2022-02-17 PROCEDURE — 6360000002 HC RX W HCPCS: Performed by: SURGERY

## 2022-02-17 PROCEDURE — 6370000000 HC RX 637 (ALT 250 FOR IP): Performed by: HOSPITALIST

## 2022-02-17 PROCEDURE — 85025 COMPLETE CBC W/AUTO DIFF WBC: CPT

## 2022-02-17 RX ORDER — TOPIRAMATE 100 MG/1
100 TABLET, FILM COATED ORAL 2 TIMES DAILY
Status: DISCONTINUED | OUTPATIENT
Start: 2022-02-17 | End: 2022-02-17 | Stop reason: HOSPADM

## 2022-02-17 RX ORDER — PANTOPRAZOLE SODIUM 20 MG/1
20 TABLET, DELAYED RELEASE ORAL 2 TIMES DAILY
Qty: 60 TABLET | Refills: 3 | Status: SHIPPED | OUTPATIENT
Start: 2022-02-17

## 2022-02-17 RX ORDER — HYDROCODONE BITARTRATE AND ACETAMINOPHEN 5; 325 MG/1; MG/1
1 TABLET ORAL EVERY 6 HOURS PRN
Qty: 20 TABLET | Refills: 0 | Status: SHIPPED | OUTPATIENT
Start: 2022-02-17 | End: 2022-02-17 | Stop reason: HOSPADM

## 2022-02-17 RX ORDER — AMOXICILLIN 250 MG
1 CAPSULE ORAL DAILY
Qty: 14 TABLET | Refills: 0 | Status: SHIPPED | OUTPATIENT
Start: 2022-02-17 | End: 2022-03-03

## 2022-02-17 RX ORDER — TRAMADOL HYDROCHLORIDE 50 MG/1
50 TABLET ORAL EVERY 6 HOURS PRN
Qty: 12 TABLET | Refills: 0 | Status: SHIPPED | OUTPATIENT
Start: 2022-02-17 | End: 2022-02-20

## 2022-02-17 RX ORDER — SERTRALINE HYDROCHLORIDE 100 MG/1
100 TABLET, FILM COATED ORAL DAILY
Status: DISCONTINUED | OUTPATIENT
Start: 2022-02-17 | End: 2022-02-17 | Stop reason: HOSPADM

## 2022-02-17 RX ORDER — ONDANSETRON 4 MG/1
4 TABLET, ORALLY DISINTEGRATING ORAL 3 TIMES DAILY PRN
Qty: 21 TABLET | Refills: 2 | Status: SHIPPED | OUTPATIENT
Start: 2022-02-17

## 2022-02-17 RX ADMIN — KETOROLAC TROMETHAMINE 30 MG: 30 INJECTION, SOLUTION INTRAMUSCULAR; INTRAVENOUS at 02:24

## 2022-02-17 RX ADMIN — SERTRALINE HYDROCHLORIDE 100 MG: 100 TABLET ORAL at 09:13

## 2022-02-17 RX ADMIN — TOPIRAMATE 100 MG: 100 TABLET, FILM COATED ORAL at 09:32

## 2022-02-17 RX ADMIN — SODIUM CHLORIDE, PRESERVATIVE FREE 10 ML: 5 INJECTION INTRAVENOUS at 09:13

## 2022-02-17 RX ADMIN — ENOXAPARIN SODIUM 40 MG: 100 INJECTION SUBCUTANEOUS at 09:12

## 2022-02-17 RX ADMIN — ACETAMINOPHEN 325 MG: 325 TABLET ORAL at 09:12

## 2022-02-17 RX ADMIN — SODIUM CHLORIDE, POTASSIUM CHLORIDE, SODIUM LACTATE AND CALCIUM CHLORIDE: 600; 310; 30; 20 INJECTION, SOLUTION INTRAVENOUS at 03:20

## 2022-02-17 RX ADMIN — ACETAMINOPHEN 325 MG: 325 TABLET ORAL at 02:23

## 2022-02-17 RX ADMIN — GABAPENTIN 100 MG: 100 CAPSULE ORAL at 09:33

## 2022-02-17 RX ADMIN — FAMOTIDINE 20 MG: 10 INJECTION, SOLUTION INTRAVENOUS at 09:12

## 2022-02-17 RX ADMIN — KETOROLAC TROMETHAMINE 30 MG: 30 INJECTION, SOLUTION INTRAMUSCULAR; INTRAVENOUS at 09:12

## 2022-02-17 RX ADMIN — CEFAZOLIN SODIUM 2000 MG: 2 INJECTION, SOLUTION INTRAVENOUS at 04:19

## 2022-02-17 RX ADMIN — SODIUM CHLORIDE, POTASSIUM CHLORIDE, SODIUM LACTATE AND CALCIUM CHLORIDE: 600; 310; 30; 20 INJECTION, SOLUTION INTRAVENOUS at 07:28

## 2022-02-17 RX ADMIN — CEFAZOLIN SODIUM 2000 MG: 2 INJECTION, SOLUTION INTRAVENOUS at 13:34

## 2022-02-17 ASSESSMENT — PAIN SCALES - GENERAL
PAINLEVEL_OUTOF10: 5
PAINLEVEL_OUTOF10: 0
PAINLEVEL_OUTOF10: 0
PAINLEVEL_OUTOF10: 5
PAINLEVEL_OUTOF10: 5

## 2022-02-17 ASSESSMENT — PAIN SCALES - WONG BAKER: WONGBAKER_NUMERICALRESPONSE: 0

## 2022-02-17 NOTE — PROGRESS NOTES
Outpatient Pharmacy Progress Note for Meds-to-Beds    Total number of Prescriptions Filled: 3  The following medications were dispensed to the patient during the discharge process:  Pantoprazole 20mg  Ondansetron 4mg  Tramadol 50mg    Additional Documentation:  Patient's family member picked-up the medication(s) in the OP Pharmacy  Patient also was able to purchase Senna OTC      Thank you for letting us serve your patients.   [facility] [place]    [address], [address] [facility]    Phone: [phone]    Fax: [phone]

## 2022-02-17 NOTE — PROGRESS NOTES
Patient A/Ox4, VSS, see doc marya, incisions clean, dry, and intact, c/o pain 5/10, scheduled pain meds given, patient satisfied, patient tolerated ambulation well, no nausea after clear liquids, patient denies needs at this time.

## 2022-02-17 NOTE — PROGRESS NOTES
Patient discharge instructions and prescriptions reviewed and verified. RN reviewed d/c instructions with patient. All questions answered. Prescriptions being picked up by patient's spouse in out patient pharmacy,including corrected Rx for tramadol. Rx information and side effects  given to patient. Discharge paperwork signed by RN and patient. Discharged to car  via wheelchair, home with spouse.

## 2022-02-17 NOTE — PROGRESS NOTES
1840 Assessment completed. Pt had small clear emesis. Pt denies needs. Call light in reach. SDS's on. Encouraged incentive spirometry and cough deep breath. 1920 In to check on pt. Pt resting with eyes closed respirations even and unlabored. Call light in reach. 2020 In to check on pt. Pt given ordered med's. Pt denies needs. Call light in reach. Pt unable to drink full liquid tray. 2120 In to check on pt. Pt up to restroom. Pt Voided without difficulty. Pt walked around SDS x 2 and back to bed. Pt tolerated well. Call light in reach. Scd's put back on her. Monitors reapplied. 2120 In to check on pt. Pt resting with eyes closed. Respirations even and unlabored. Call light in reach. 2218  In to check on pt. Pt resting with eyes closed. Respirations even and unlabored. Call light in reach. 2300 Pt up to restroom with assist. Pt tolerated well and voided 300 cc candace colored urine. Pt walked around SDS x 1 and back to bed. Pt given Sodium free chicken broth and sugar free jello. Call light in reach. SCD' put back on pt. Monitors reapplied. 2320 Pt took couple sips of broth and couple spoons of jello. Pt denies needs. Call light in reach. 0010 In to check on pt. Pt resting with eyes closed. Respirations even and unlabored. Call light in reach. 0110  In to check on pt. Pt resting with eyes closed. Respirations even and unlabored. Call light in reach. 0210  In to check on pt. Pt resting with eyes closed. Respirations even and unlabored. Call light in reach. 0245 Pt up to restroom with assist. Pt tolerated well and voided 350 cc yellow urine. Pt walked with me around SDS three times. Pt back to bed. Monitors reapplied. Scd's on. Pt denies needs. Call light in reach. 0345 In to check on pt. Pt resting with eyes closed. Respirations even and unlabored. Call light in reach. 0420 Pt up to chair per pt request. Pt tolerated well. Urinal emptied 250 cc yellow urine. Call light in reach. Pt denies needs.    0520 Pt up to restroom with assist. Pt tolerated well and voided 350 cc yellow urine. Pt walked with me around SDStwo times. Pt back to bed. Monitors reapplied. Scd's on. Pt denies needs. Call light in reach.

## 2022-02-17 NOTE — PROGRESS NOTES
Spoke to Sundeep Ochoa in pharmacy, asked for 1200 Ancef to be tubed to \[facility] [place] [facility]\"" for the 2nd time

## 2022-02-17 NOTE — PROGRESS NOTES
[facility] HOSPITALIST Consultation      PCP: MIREYA Hayward CNP    Date of Admission: 2/16/2022    Subjective: No pain      Brief Hospital summary *patient is a 52-year-old female with history of mood disorder and migraine headaches who was admitted for elective gastric sleeve procedure. Patient underwent procedure yesterday    Vitals signs:  Afebrile, heart rate 78-81 range, blood pressure 114/71, on room air    Medications: Cefazolin, Lovenox, famotidine, gabapentin, Toradol, Ringer's lactate at 125 mill per hour    Antibiotics: Cefazolin    Fluid status: 950 cc overnight    Labs:   Labs from admission reviewed, creatinine 0.7, WBC 11, hemoglobin 11.8    Imaging:   None    Assessment/Plan: Morbid obesity:   Status post elective gastric sleeve procedure  Management per surgery  Decrease rate of IV fluids, will defer to surgery    Mood disorder sertraline:    History of migraine headaches Topamax:    DVT prophlaxis:   Lovenox per surgery      Physical Exam Performed:       /71   Pulse 72   Temp 97 °F (36.1 °C) (Temporal)   Resp 16   Ht 5' 3\" (1.6 m)   Wt 212 lb (96.2 kg)   LMP 10/18/2018 (Approximate)   SpO2 100%   BMI 37.26 kg/m²     Physical Exam  Constitutional:       General: She is not in acute distress. Appearance: Normal appearance. HENT:      Head: Normocephalic and atraumatic. Right Ear: External ear normal.      Left Ear: External ear normal.   Eyes:      Extraocular Movements: Extraocular movements intact. Pupils: Pupils are equal, round, and reactive to light. Cardiovascular:      Rate and Rhythm: Normal rate and regular rhythm. Heart sounds: No murmur heard. Pulmonary:      Effort: Pulmonary effort is normal. No respiratory distress. Breath sounds: Normal breath sounds. No wheezing. Abdominal:      General: Bowel sounds are normal. There is no distension. Palpations: Abdomen is soft. Tenderness:  There is no abdominal tenderness. Musculoskeletal:         General: No swelling. Cervical back: Normal range of motion. Skin:     General: Skin is warm. Neurological:      General: No focal deficit present. Mental Status: She is alert and oriented to person, place, and time. Cranial Nerves: No cranial nerve deficit. Psychiatric:         Mood and Affect: Mood normal.         Labs:   Recent Labs     02/17/22  0507   WBC 11.0*   HGB 11.8*   HCT 34.8*        Recent Labs     02/17/22  0507      K 4.0      CO2 23   BUN 9   CREATININE 0.7   CALCIUM 8.8     Recent Labs     02/17/22  0507   AST 25   ALT 28   BILITOT 0.2   ALKPHOS 58     No results for input(s): INR in the last 72 hours. No results for input(s): Chyrel Lions in the last 72 hours.     Urinalysis:      Lab Results   Component Value Date    NITRU NEGATIVE 06/28/2016    WBCUA 40 06/28/2016    BACTERIA RARE 06/28/2016    RBCUA NONE SEEN 06/28/2016    BLOODU NEGATIVE 06/28/2016    SPECGRAV 1.011 06/28/2016    GLUCOSEU neg 04/20/2016       Radiology:  No orders to display           Avi Miranda MD  2/17/2022 8:44 AM

## 2022-02-17 NOTE — DISCHARGE SUMMARY
Discharge Summary     Patient ID:  Emilia Grider  [id number]  1 y.o.  1995    Admit date: 2/16/2022    Discharge date: 2/17/2022     Admitting Physician: Annel Silverman MD     Admission Diagnoses: Morbid obesity ([facility] [address] [E66.01]  Patient Active Problem List   Diagnosis    Diarrhea    Non morbid obesity due to excess calories    Migraine without aura and without status migrainosus, not intractable    Depression    Muscle spasm of back    Anxiety    Latex allergy    S/P laparoscopic cholecystectomy    Morbid obesity with BMI of 40.0-44.9, adult ([facility] [address]    Esophageal reflux    Morbid obesity due to excess calories ([facility] [address]     Past Medical History:   Diagnosis Date    37 weeks gestation of pregnancy 06/28/2016    Anemia     COVID-19 01/01/2022    Positive test (see under media)    H/O echocardiogram 05/21/2021    EF 55-60% no evidence of pericardial effusion no significant valvular regurgitation    History of blood transfusion 2014    Received 5 units after childbirth    History of postpartum hemorrhage, currently pregnant in third trimester 06/28/2016    Hx of cardiovascular stress test 05/07/2021    Normal study    Migraines     Preeclampsia 2014    Sciatica        Discharge Diagnoses: same    Admission Condition: Good. Discharged Condition: Good. Indication for Admission: Elective bariatric surgery. Hospital Course: Patient had an uneventful hospital course after her bariatric procedure that went uneventfully: robot assisted sleeve gastrectomy and was tolerating bariatric stage II diet and ambulating without difficulty at the time of discharge. Consults: Hospitalist / PCP. Treatments: IV hydration, antibiotics, analgesia, LMW heparin, respiratory therapy: O2 and incentive spirometry and surgery: robot assisted sleeve gastrectomy. Discharge Exam:  - See daily progress note    Discharge vitals:    Wt Readings from Last 3 Encounters: 02/16/22 212 lb (96.2 kg)   02/15/22 209 lb 1.6 oz (94.8 kg)   02/01/22 217 lb 8 oz (98.7 kg)   ,  Temp Readings from Last 3 Encounters:   02/17/22 97 °F (36.1 °C) (Temporal)   02/16/22 97.4 °F (36.3 °C)   10/11/21 97.9 °F (36.6 °C) (Temporal)   ,  BP Readings from Last 3 Encounters:   02/17/22 114/71   02/16/22 94/71   02/15/22 120/76   ,  Pulse Readings from Last 3 Encounters:   02/17/22 72   02/15/22 100   02/01/22 70        Disposition: home. Patient Instructions:   Current Discharge Medication List      START taking these medications    Details   pantoprazole (PROTONIX) 20 MG tablet Take 1 tablet by mouth 2 times daily  Qty: 60 tablet, Refills: 3      HYDROcodone-acetaminophen (NORCO) 5-325 MG per tablet Take 1 tablet by mouth every 6 hours as needed for Pain for up to 5 days. Intended supply: 5 days. Take lowest dose possible to manage pain  Qty: 20 tablet, Refills: 0    Comments: Reduce doses taken as pain becomes manageable  Associated Diagnoses: Morbid obesity ([address]      senna-docusate (SENOKOT S) 8.6-50 MG per tablet Take 1 tablet by mouth daily for 14 days  Qty: 14 tablet, Refills: 0      ondansetron (ZOFRAN-ODT) 4 MG disintegrating tablet Take 1 tablet by mouth 3 times daily as needed for Nausea or Vomiting  Qty: 21 tablet, Refills: 2         CONTINUE these medications which have NOT CHANGED    Details   topiramate (TOPAMAX) 100 MG tablet Take 100 mg by mouth 2 times daily    Associated Diagnoses: Morbid obesity due to excess calories ([address] Migraine without aura and without status migrainosus, not intractable; Morbid obesity with BMI of 40.0-44.9, adult ([address] Gastroesophageal reflux disease with esophagitis without hemorrhage; Diarrhea, unspecified type; Non morbid obesity due to excess calories; Current mild episode of major depressive disorder without prior episode ([address] Muscle spasm of back; Anxiety;  Latex allergy; S/P laparoscopic cholecystectomy      nystatin (MYCOSTATIN) 544885 UNIT/GM powder Apply 3 times daily. Qty: 1 Bottle, Refills: 5      sertraline (ZOLOFT) 50 MG tablet Take 1 tablet by mouth daily  Qty: 30 tablet, Refills: 5    Associated Diagnoses: Anxiety; Depression, unspecified depression type      EPINEPHrine (EPIPEN 2-CLEOPATRA) 0.3 MG/0.3ML SOAJ injection Use as directed for allergic reaction  Qty: 2 each, Refills: 0    Associated Diagnoses: Latex allergy           Activity: no lifting > 20 Lbs, or Strenuous exercise for 3 weeks. Diet: encourage fluids and bariatric stage II diet for 2 wks.   Wound Care: keep wound clean and dry    Call  [phone] to make a follow-up appointment  with Dr Salvador García in 1 week.    ____________________________________________    Signed:    MIREYA Pinto CNP, APRN-CNP    2/17/2022  1:31 PM

## 2022-02-23 ENCOUNTER — TELEPHONE (OUTPATIENT)
Dept: BARIATRICS/WEIGHT MGMT | Age: 27
End: 2022-02-23

## 2022-02-23 LAB — VZV IGG SER QL IA: 405 INDEX

## 2022-02-24 ENCOUNTER — OFFICE VISIT (OUTPATIENT)
Dept: BARIATRICS/WEIGHT MGMT | Age: 27
End: 2022-02-24

## 2022-02-24 VITALS
HEART RATE: 101 BPM | BODY MASS INDEX: 35.37 KG/M2 | OXYGEN SATURATION: 96 % | WEIGHT: 199.6 LBS | SYSTOLIC BLOOD PRESSURE: 114 MMHG | HEIGHT: 63 IN | DIASTOLIC BLOOD PRESSURE: 68 MMHG

## 2022-02-24 DIAGNOSIS — Z98.84 S/P LAPAROSCOPIC SLEEVE GASTRECTOMY: Primary | ICD-10-CM

## 2022-02-24 LAB
MYCOBACTERIUM TUBERCULOSIS STIMULATED GAMMA INTERFERON, BLOOD: NEGATIVE
QUANTIFERON, INCUBATED: NORMAL

## 2022-02-24 PROCEDURE — 99024 POSTOP FOLLOW-UP VISIT: CPT | Performed by: NURSE PRACTITIONER

## 2022-02-24 RX ORDER — AMOXICILLIN 500 MG/1
1000 CAPSULE ORAL 2 TIMES DAILY
Qty: 56 CAPSULE | Refills: 0 | Status: SHIPPED | OUTPATIENT
Start: 2022-02-24 | End: 2022-03-10

## 2022-02-24 RX ORDER — PANTOPRAZOLE SODIUM 40 MG/1
40 TABLET, DELAYED RELEASE ORAL 2 TIMES DAILY
Qty: 28 TABLET | Refills: 0 | Status: SHIPPED | OUTPATIENT
Start: 2022-02-24 | End: 2022-04-06 | Stop reason: SDUPTHER

## 2022-02-24 RX ORDER — CLARITHROMYCIN 500 MG/1
500 TABLET, COATED ORAL 2 TIMES DAILY
Qty: 28 TABLET | Refills: 0 | Status: SHIPPED | OUTPATIENT
Start: 2022-02-24 | End: 2022-03-10

## 2022-02-24 ASSESSMENT — ENCOUNTER SYMPTOMS
SORE THROAT: 0
WHEEZING: 0
COLOR CHANGE: 0
APNEA: 0
PHOTOPHOBIA: 0
SHORTNESS OF BREATH: 0
BACK PAIN: 0
NAUSEA: 1
DIARRHEA: 0
CHEST TIGHTNESS: 0

## 2022-02-24 ASSESSMENT — PATIENT HEALTH QUESTIONNAIRE - PHQ9
SUM OF ALL RESPONSES TO PHQ QUESTIONS 1-9: 0
2. FEELING DOWN, DEPRESSED OR HOPELESS: 0
SUM OF ALL RESPONSES TO PHQ QUESTIONS 1-9: 0
1. LITTLE INTEREST OR PLEASURE IN DOING THINGS: 0
SUM OF ALL RESPONSES TO PHQ QUESTIONS 1-9: 0
SUM OF ALL RESPONSES TO PHQ QUESTIONS 1-9: 0
SUM OF ALL RESPONSES TO PHQ9 QUESTIONS 1 & 2: 0

## 2022-02-24 NOTE — PROGRESS NOTES
BARIATRIC SURGERY OFFICE PROGRESS NOTE    SUBJECTIVE:    Patient presenting today referred from MIREYA Pereira CNP, for   Chief Complaint   Patient presents with    Post-Op Check     1st po bolivar sg/poss hhr/ egd  2srmc 2/16/22   . Vitals:    02/24/22 1118   BP: 114/68   Pulse: 101   SpO2: 96%        BMI: Body mass index is 35.08 kg/m². Weight History: Wt Readings from Last 3 Encounters:   02/24/22 199 lb 9.6 oz (90.5 kg)   02/16/22 212 lb (96.2 kg)   02/15/22 209 lb 1.6 oz (94.8 kg)        If within 30 days of bariatric surgery date, have you been to the ED: N/A, No, Yes - NO      HPI:Estefani Rene is a 27 y.o. female presenting in first bariatric POST-OP visit. Total weight loss/gain:   -9.5 lbs since last visit  -9.5 lbs since surgery  - lbs since starting program    Changes in health since last visit: denies    Pt tracking calories: not tracking calories. States about 30 G of protein    Pt exercising: getting up and moving around. States that ever since getting home from the hospital everything has been normal    Pt taking vitamins: not yet. Fluid intake: 32 ounces of water a day    Has had a BM    Denies vomiting.  Some nausea    Past Medical History:   Diagnosis Date    37 weeks gestation of pregnancy 06/28/2016    Anemia     COVID-19 01/01/2022    Positive test (see under media)    H/O echocardiogram 05/21/2021    EF 55-60% no evidence of pericardial effusion no significant valvular regurgitation    History of blood transfusion 2014    Received 5 units after childbirth    History of postpartum hemorrhage, currently pregnant in third trimester 06/28/2016    Hx of cardiovascular stress test 05/07/2021    Normal study    Migraines     Preeclampsia 2014    Sciatica         Patient Active Problem List   Diagnosis    Diarrhea    Non morbid obesity due to excess calories    Migraine without aura and without status migrainosus, not intractable    Depression    Muscle spasm of back  Anxiety    Latex allergy    S/P laparoscopic cholecystectomy    Morbid obesity with BMI of 40.0-44.9, adult (HCC)    Esophageal reflux    Morbid obesity due to excess calories ([address]    S/P laparoscopic sleeve gastrectomy       Past Surgical History:   Procedure Laterality Date    CHOLECYSTECTOMY  12/13/2018    DENTAL SURGERY      wisdom teeth    DILATION AND CURETTAGE OF UTERUS  2018    HYSTERECTOMY  10/2018    SALPINGECTOMY Bilateral 2016    SLEEVE GASTRECTOMY N/A 2/16/2022    GASTRECTOMY SLEEVE LAPAROSCOPIC ROBOTIC performed by Essie Lai MD at [facility] N/A 4/12/2021    EGD BIOPSY performed by Simba Ellis MD at [facility] ENDOSCOPY       Current Outpatient Medications   Medication Sig Dispense Refill    amoxicillin (AMOXIL) 500 MG capsule Take 2 capsules by mouth 2 times daily for 14 days 56 capsule 0    clarithromycin (BIAXIN) 500 MG tablet Take 1 tablet by mouth 2 times daily for 14 days 28 tablet 0    pantoprazole (PROTONIX) 40 MG tablet Take 1 tablet by mouth 2 times daily for 14 days 28 tablet 0    pantoprazole (PROTONIX) 20 MG tablet Take 1 tablet by mouth 2 times daily 60 tablet 3    senna-docusate (SENOKOT S) 8.6-50 MG per tablet Take 1 tablet by mouth daily for 14 days 14 tablet 0    ondansetron (ZOFRAN-ODT) 4 MG disintegrating tablet Take 1 tablet by mouth 3 times daily as needed for Nausea or Vomiting 21 tablet 2    topiramate (TOPAMAX) 100 MG tablet Take 50 mg by mouth 2 times daily       EPINEPHrine (EPIPEN 2-CLEOPATRA) 0.3 MG/0.3ML SOAJ injection Use as directed for allergic reaction 2 each 0    sertraline (ZOLOFT) 50 MG tablet Take 1 tablet by mouth daily (Patient taking differently: Take 100 mg by mouth daily ) 30 tablet 5    nystatin (MYCOSTATIN) 976290 UNIT/GM powder Apply 3 times daily. 1 Bottle 5     No current facility-administered medications for this visit.         Allergies   Allergen Reactions    Latex Hives and Rash    Macrobid [Nitrofurantoin Macrocrystal] Hives     No SOB or closing of throat noted when pt had reaction      Ropivacaine      \"felt like bugs were crawling on my skin\"    Norco [Hydrocodone-Acetaminophen]      Makes her really sick to her stomach; hard to wake up with,it keeps her asleep    Other Rash     Prolene suture    Tape Verl Scrape Tape] Rash         Review of Systems   Constitutional: Negative for fatigue and fever. HENT: Negative for congestion, dental problem and sore throat. Eyes: Negative for photophobia and visual disturbance. Respiratory: Negative for apnea, chest tightness, shortness of breath and wheezing. Cardiovascular: Negative for chest pain and leg swelling. Gastrointestinal: Positive for nausea. Negative for diarrhea. Occasional nausea. Endocrine: Negative for cold intolerance and heat intolerance. Genitourinary: Negative for difficulty urinating, dysuria, flank pain, frequency and hematuria. Musculoskeletal: Negative for arthralgias and back pain. Skin: Negative for color change, rash and wound. Allergic/Immunologic: Negative for environmental allergies, food allergies and immunocompromised state. Neurological: Negative for dizziness, weakness, light-headedness and numbness. Hematological: Negative for adenopathy. Does not bruise/bleed easily. Psychiatric/Behavioral: Negative for behavioral problems, confusion, sleep disturbance and suicidal ideas. OBJECTIVE:    /68 (Site: Left Upper Arm, Position: Sitting, Cuff Size: Large Adult)   Pulse 101   Ht 5' 3.25\" (1.607 m)   Wt 199 lb 9.6 oz (90.5 kg)   LMP 10/18/2018 (Approximate)   SpO2 96%   BMI 35.08 kg/m²      Physical Exam  Vitals reviewed. Constitutional:       Appearance: She is obese. HENT:      Head: Normocephalic and atraumatic.       Right Ear: External ear normal.      Left Ear: External ear normal.      Nose: Nose normal.      Mouth/Throat:      Mouth: Mucous membranes are moist.   Eyes:      Extraocular Movements: Extraocular movements intact. Pupils: Pupils are equal, round, and reactive to light. Cardiovascular:      Rate and Rhythm: Normal rate and regular rhythm. Pulses: Normal pulses. Heart sounds: Normal heart sounds. Pulmonary:      Effort: Pulmonary effort is normal.      Breath sounds: Normal breath sounds. Abdominal:      General: Bowel sounds are normal.   Musculoskeletal:         General: Normal range of motion. Cervical back: Normal range of motion and neck supple. Skin:     General: Skin is warm and dry. Findings: Bruising present. Comments: Incisions well approximated. Skin glue intact   Neurological:      General: No focal deficit present. Mental Status: She is alert and oriented to person, place, and time. Mental status is at baseline. Psychiatric:         Mood and Affect: Mood normal.         Behavior: Behavior normal.         ASSESSMENT & PLAN:    1. S/P laparoscopic sleeve gastrectomy  - Pathology reviewed. H. Pylori present. Will send in medications.  -Continue to track calories. 600 / day. 60G protein / day  - Increase activity  - Continue full liquids for another week and then advance to bariatric puree diet.  - RTC 1 week     As of current visit, regarding obesity-related co-morbid conditions:  DEB [] compliant [] no longer using [] resolved per sleep study; hypertension [] medications; hyperlipidemia [] medications; GERD [] medications; DM [] insulin [] non-insulin [] no meds    The patient expressed understanding and willingness to comply nicely; all questions and concerns addressed.     Orders Placed This Encounter   Medications    amoxicillin (AMOXIL) 500 MG capsule     Sig: Take 2 capsules by mouth 2 times daily for 14 days     Dispense:  56 capsule     Refill:  0    clarithromycin (BIAXIN) 500 MG tablet     Sig: Take 1 tablet by mouth 2 times daily for 14 days     Dispense:  28 tablet     Refill:  0    pantoprazole (PROTONIX) 40 MG tablet     Sig: Take 1 tablet by mouth 2 times daily for 14 days     Dispense:  28 tablet     Refill:  0     No orders of the defined types were placed in this encounter. Follow Up:  Return in about 1 week (around 3/3/2022).     MIREYA Pinto - CNP

## 2022-03-02 ENCOUNTER — OFFICE VISIT (OUTPATIENT)
Dept: BARIATRICS/WEIGHT MGMT | Age: 27
End: 2022-03-02
Payer: MEDICAID

## 2022-03-02 VITALS
OXYGEN SATURATION: 97 % | WEIGHT: 197.4 LBS | HEART RATE: 88 BPM | BODY MASS INDEX: 34.98 KG/M2 | SYSTOLIC BLOOD PRESSURE: 116 MMHG | DIASTOLIC BLOOD PRESSURE: 78 MMHG | HEIGHT: 63 IN

## 2022-03-02 DIAGNOSIS — Z98.84 S/P LAPAROSCOPIC SLEEVE GASTRECTOMY: Primary | ICD-10-CM

## 2022-03-02 PROCEDURE — G8484 FLU IMMUNIZE NO ADMIN: HCPCS | Performed by: NURSE PRACTITIONER

## 2022-03-02 PROCEDURE — 1111F DSCHRG MED/CURRENT MED MERGE: CPT | Performed by: NURSE PRACTITIONER

## 2022-03-02 PROCEDURE — G8417 CALC BMI ABV UP PARAM F/U: HCPCS | Performed by: NURSE PRACTITIONER

## 2022-03-02 PROCEDURE — G8427 DOCREV CUR MEDS BY ELIG CLIN: HCPCS | Performed by: NURSE PRACTITIONER

## 2022-03-02 PROCEDURE — 1036F TOBACCO NON-USER: CPT | Performed by: NURSE PRACTITIONER

## 2022-03-02 PROCEDURE — 99213 OFFICE O/P EST LOW 20 MIN: CPT | Performed by: NURSE PRACTITIONER

## 2022-03-02 ASSESSMENT — PATIENT HEALTH QUESTIONNAIRE - PHQ9
SUM OF ALL RESPONSES TO PHQ9 QUESTIONS 1 & 2: 0
SUM OF ALL RESPONSES TO PHQ QUESTIONS 1-9: 0
SUM OF ALL RESPONSES TO PHQ QUESTIONS 1-9: 0
1. LITTLE INTEREST OR PLEASURE IN DOING THINGS: 0
2. FEELING DOWN, DEPRESSED OR HOPELESS: 0
SUM OF ALL RESPONSES TO PHQ QUESTIONS 1-9: 0
SUM OF ALL RESPONSES TO PHQ QUESTIONS 1-9: 0

## 2022-03-02 ASSESSMENT — ENCOUNTER SYMPTOMS
EYES NEGATIVE: 1
ALLERGIC/IMMUNOLOGIC NEGATIVE: 1
RESPIRATORY NEGATIVE: 1

## 2022-03-02 NOTE — PROGRESS NOTES
BARIATRIC SURGERY OFFICE PROGRESS NOTE    SUBJECTIVE:    Patient presenting today referred from MIREYA Gupta CNP, for   Chief Complaint   Patient presents with    Post-Op Check     P/O [address] @ [facility] 2/16/22   . Vitals:    03/02/22 1320   BP: 116/78   Pulse: 88   SpO2: 97%        BMI: Body mass index is 34.69 kg/m². Weight History: Wt Readings from Last 3 Encounters:   03/02/22 197 lb 6.4 oz (89.5 kg)   02/24/22 199 lb 9.6 oz (90.5 kg)   02/16/22 212 lb (96.2 kg)        If within 30 days of bariatric surgery date, have you been to the ED: Uma Pinto is a 27 y.o. female presenting in second bariatric / two weeks POST-OP visit. Total weight loss/gain:   -2.2 lbs since last visit  -11.7 lbs since surgery  -54.1 lbs since starting program    Changes in health since last visit: denies    Pt tracking calories: not tracking calories; not hungry to eat, unsure how much protein she is taking in    Pt exercising: staying active    Pt taking vitamins: will start vitamins this week     Fluid intake: 40-50 oz a day    Bm: soft to liquid Bm     Incision: healing well. Mid ABd incision sore; well-= approximated and not inflamed. Did move some furniture this past week.     Nausea or vomiting: denies; not taking zofran either    Past Medical History:   Diagnosis Date    37 weeks gestation of pregnancy 06/28/2016    Anemia     COVID-19 01/01/2022    Positive test (see under media)    H/O echocardiogram 05/21/2021    EF 55-60% no evidence of pericardial effusion no significant valvular regurgitation    History of blood transfusion 2014    Received 5 units after childbirth    History of postpartum hemorrhage, currently pregnant in third trimester 06/28/2016    Hx of cardiovascular stress test 05/07/2021    Normal study    Migraines     Preeclampsia 2014    Sciatica         Patient Active Problem List   Diagnosis    Diarrhea    Non morbid obesity due to excess calories    Migraine without aura and without status migrainosus, not intractable    Depression    Muscle spasm of back    Anxiety    Latex allergy    S/P laparoscopic cholecystectomy    Morbid obesity with BMI of 40.0-44.9, adult (HCC)    Esophageal reflux    Morbid obesity due to excess calories ([address]    S/P laparoscopic sleeve gastrectomy       Past Surgical History:   Procedure Laterality Date    CHOLECYSTECTOMY  12/13/2018    DENTAL SURGERY      wisdom teeth    DILATION AND CURETTAGE OF UTERUS  2018    HYSTERECTOMY  10/2018    SALPINGECTOMY Bilateral 2016    SLEEVE GASTRECTOMY N/A 2/16/2022    GASTRECTOMY SLEEVE LAPAROSCOPIC ROBOTIC performed by Ash Dent MD at [address] [place] [address] 4/12/2021    EGD BIOPSY performed by Stan Pedraza MD at [facility] ENDOSCOPY       Current Outpatient Medications   Medication Sig Dispense Refill    amoxicillin (AMOXIL) 500 MG capsule Take 2 capsules by mouth 2 times daily for 14 days 56 capsule 0    clarithromycin (BIAXIN) 500 MG tablet Take 1 tablet by mouth 2 times daily for 14 days 28 tablet 0    pantoprazole (PROTONIX) 40 MG tablet Take 1 tablet by mouth 2 times daily for 14 days 28 tablet 0    pantoprazole (PROTONIX) 20 MG tablet Take 1 tablet by mouth 2 times daily 60 tablet 3    senna-docusate (SENOKOT S) 8.6-50 MG per tablet Take 1 tablet by mouth daily for 14 days 14 tablet 0    ondansetron (ZOFRAN-ODT) 4 MG disintegrating tablet Take 1 tablet by mouth 3 times daily as needed for Nausea or Vomiting 21 tablet 2    topiramate (TOPAMAX) 100 MG tablet Take 50 mg by mouth 2 times daily       EPINEPHrine (EPIPEN 2-CLEOPATRA) 0.3 MG/0.3ML SOAJ injection Use as directed for allergic reaction 2 each 0    sertraline (ZOLOFT) 50 MG tablet Take 1 tablet by mouth daily (Patient taking differently: Take 100 mg by mouth daily ) 30 tablet 5    nystatin (MYCOSTATIN) 719918 UNIT/GM powder Apply 3 times daily.  1 Bottle 5     No current facility-administered medications for this visit. Allergies   Allergen Reactions    Latex Hives and Rash    Macrobid [Nitrofurantoin Macrocrystal] Hives     No SOB or closing of throat noted when pt had reaction      Ropivacaine      \"felt like bugs were crawling on my skin\"    Norco [Hydrocodone-Acetaminophen]      Makes her really sick to her stomach; hard to wake up with,it keeps her asleep    Other Rash     Prolene suture    Tape Matthew Jessica Tape] Rash         Review of Systems   Constitutional: Negative. HENT: Negative. Eyes: Negative. Respiratory: Negative. Cardiovascular: Negative. Gastrointestinal:        Mid ABD incisional pain that is achy and constant   Endocrine: Negative. Genitourinary: Negative. Musculoskeletal: Negative. Skin: Negative. Allergic/Immunologic: Negative. Neurological: Negative. Hematological: Negative. Psychiatric/Behavioral: Negative. OBJECTIVE:    /78 (Site: Left Upper Arm, Position: Sitting, Cuff Size: Medium Adult)   Pulse 88   Ht 5' 3.25\" (1.607 m)   Wt 197 lb 6.4 oz (89.5 kg)   LMP 10/18/2018 (Approximate)   SpO2 97%   BMI 34.69 kg/m²      Physical Exam  Constitutional:       Appearance: Normal appearance. HENT:      Head: Normocephalic. Nose: Nose normal.      Mouth/Throat:      Pharynx: Oropharynx is clear. Eyes:      Conjunctiva/sclera: Conjunctivae normal.      Pupils: Pupils are equal, round, and reactive to light. Cardiovascular:      Rate and Rhythm: Normal rate. Pulses: Normal pulses. Pulmonary:      Effort: Pulmonary effort is normal.      Breath sounds: Normal breath sounds. Abdominal:      General: Bowel sounds are normal.      Palpations: Abdomen is soft. Comments: All incisions healing well. Sites soft around incisions. Mid ABD incision tender with palpation   Musculoskeletal:         General: Normal range of motion. Cervical back: Normal range of motion.    Skin:     General: Skin is warm and dry.      Capillary Refill: Capillary refill takes less than 2 seconds. Neurological:      General: No focal deficit present. Mental Status: She is alert and oriented to person, place, and time. Psychiatric:         Mood and Affect: Mood normal.         Behavior: Behavior normal.         ASSESSMENT & PLAN:    1. S/P laparoscopic sleeve gastrectomy  - two week appt- surgery on 2/16/22  - continue to track calories; about 600 day and 60 gm protein  - minimum of 32 oz water daily- advance as tolerated  - increase activity  - may start pureed diet next week  - may start chewable vitamins this week and continue for a month  - continue PPI and ATB therapy as previously ordered for Hpylori  - RTC in 2 weeks       As of current visit, regarding obesity-related co-morbid conditions:  DEB [] compliant [] no longer using [] resolved per sleep study; hypertension [] medications; hyperlipidemia [] medications; GERD [] medications; DM [] insulin [] non-insulin [] no meds      The patient expressed understanding and willingness to comply nicely; all questions and concerns addressed. No orders of the defined types were placed in this encounter. No orders of the defined types were placed in this encounter. Follow Up:  Return in about 2 weeks (around 3/16/2022) for post op visit.     MIREYA Ariza - CNP

## 2022-03-24 ENCOUNTER — OFFICE VISIT (OUTPATIENT)
Dept: BARIATRICS/WEIGHT MGMT | Age: 27
End: 2022-03-24
Payer: MEDICAID

## 2022-03-24 VITALS
OXYGEN SATURATION: 98 % | DIASTOLIC BLOOD PRESSURE: 78 MMHG | SYSTOLIC BLOOD PRESSURE: 118 MMHG | HEIGHT: 64 IN | BODY MASS INDEX: 32.28 KG/M2 | HEART RATE: 99 BPM | WEIGHT: 189.1 LBS

## 2022-03-24 DIAGNOSIS — Z98.84 S/P LAPAROSCOPIC SLEEVE GASTRECTOMY: Primary | ICD-10-CM

## 2022-03-24 PROCEDURE — 99213 OFFICE O/P EST LOW 20 MIN: CPT | Performed by: NURSE PRACTITIONER

## 2022-03-24 PROCEDURE — 1036F TOBACCO NON-USER: CPT | Performed by: NURSE PRACTITIONER

## 2022-03-24 PROCEDURE — G8417 CALC BMI ABV UP PARAM F/U: HCPCS | Performed by: NURSE PRACTITIONER

## 2022-03-24 PROCEDURE — G8427 DOCREV CUR MEDS BY ELIG CLIN: HCPCS | Performed by: NURSE PRACTITIONER

## 2022-03-24 PROCEDURE — G8484 FLU IMMUNIZE NO ADMIN: HCPCS | Performed by: NURSE PRACTITIONER

## 2022-03-24 ASSESSMENT — ENCOUNTER SYMPTOMS
GASTROINTESTINAL NEGATIVE: 1
ALLERGIC/IMMUNOLOGIC NEGATIVE: 1
RESPIRATORY NEGATIVE: 1
EYES NEGATIVE: 1

## 2022-03-24 ASSESSMENT — PATIENT HEALTH QUESTIONNAIRE - PHQ9
SUM OF ALL RESPONSES TO PHQ QUESTIONS 1-9: 0
1. LITTLE INTEREST OR PLEASURE IN DOING THINGS: 0
SUM OF ALL RESPONSES TO PHQ9 QUESTIONS 1 & 2: 0
SUM OF ALL RESPONSES TO PHQ QUESTIONS 1-9: 0
2. FEELING DOWN, DEPRESSED OR HOPELESS: 0

## 2022-03-24 NOTE — PROGRESS NOTES
BARIATRIC SURGERY OFFICE PROGRESS NOTE    SUBJECTIVE:    Patient presenting today referred from MIREYA Coleman - CNP, for   Chief Complaint   Patient presents with    Other     3rd po bolivar sleeve    . Vitals:    03/24/22 1516   BP: 118/78   Pulse: 99   SpO2: 98%        BMI: Body mass index is 32.97 kg/m². Weight History: Wt Readings from Last 3 Encounters:   03/24/22 189 lb 1.6 oz (85.8 kg)   03/02/22 197 lb 6.4 oz (89.5 kg)   02/24/22 199 lb 9.6 oz (90.5 kg)        If within 30 days of bariatric surgery date, have you been to the ED: Kellie Haywood is a 32 y.o. female presenting in 1 month POST-OP visit.     Total weight loss/gain:   -8.3 lbs since last visit  -20 lbs since surgery  -62.4 lbs since starting program    Changes in health since last visit: denies/ back to work    Pt tracking calories: tracking calories/ 60 gm protein daily    Pt exercising: staying active    Pt taking vitamins: supplementing    Fluid intake: 4 bottles daily    Past Medical History:   Diagnosis Date    37 weeks gestation of pregnancy 06/28/2016    Anemia     COVID-19 01/01/2022    Positive test (see under media)    H/O echocardiogram 05/21/2021    EF 55-60% no evidence of pericardial effusion no significant valvular regurgitation    History of blood transfusion 2014    Received 5 units after childbirth    History of postpartum hemorrhage, currently pregnant in third trimester 06/28/2016    Hx of cardiovascular stress test 05/07/2021    Normal study    Migraines     Preeclampsia 2014    Sciatica         Patient Active Problem List   Diagnosis    Diarrhea    Non morbid obesity due to excess calories    Migraine without aura and without status migrainosus, not intractable    Depression    Muscle spasm of back    Anxiety    Latex allergy    S/P laparoscopic cholecystectomy    Morbid obesity with BMI of 40.0-44.9, adult ([address]    Esophageal reflux    Morbid obesity due to excess calories ([address]  S/P laparoscopic sleeve gastrectomy       Past Surgical History:   Procedure Laterality Date    CHOLECYSTECTOMY  12/13/2018    DENTAL SURGERY      wisdom teeth    DILATION AND CURETTAGE OF UTERUS  2018    HYSTERECTOMY  10/2018    SALPINGECTOMY Bilateral 2016    SLEEVE GASTRECTOMY N/A 2/16/2022    GASTRECTOMY SLEEVE LAPAROSCOPIC ROBOTIC performed by Deya Huerta MD at [facility]. N/A 4/12/2021    EGD BIOPSY performed by Ade Olivares MD at [facility] ENDOSCOPY       Current Outpatient Medications   Medication Sig Dispense Refill    pantoprazole (PROTONIX) 40 MG tablet Take 1 tablet by mouth 2 times daily for 14 days 28 tablet 0    pantoprazole (PROTONIX) 20 MG tablet Take 1 tablet by mouth 2 times daily 60 tablet 3    topiramate (TOPAMAX) 100 MG tablet Take 50 mg by mouth 2 times daily       EPINEPHrine (EPIPEN 2-CLEOPATRA) 0.3 MG/0.3ML SOAJ injection Use as directed for allergic reaction 2 each 0    sertraline (ZOLOFT) 50 MG tablet Take 1 tablet by mouth daily (Patient taking differently: Take 100 mg by mouth daily ) 30 tablet 5    ondansetron (ZOFRAN-ODT) 4 MG disintegrating tablet Take 1 tablet by mouth 3 times daily as needed for Nausea or Vomiting (Patient not taking: Reported on 3/24/2022) 21 tablet 2    nystatin (MYCOSTATIN) 196150 UNIT/GM powder Apply 3 times daily. 1 Bottle 5     No current facility-administered medications for this visit. Allergies   Allergen Reactions    Latex Hives and Rash    Macrobid [Nitrofurantoin Macrocrystal] Hives     No SOB or closing of throat noted when pt had reaction      Ropivacaine      \"felt like bugs were crawling on my skin\"    Norco [Hydrocodone-Acetaminophen]      Makes her really sick to her stomach; hard to wake up with,it keeps her asleep    Other Rash     Prolene suture    Tape Flori Burger Tape] Rash         Review of Systems   Constitutional: Negative. HENT: Negative. Eyes: Negative.     Respiratory: Negative. Gastrointestinal: Negative. Endocrine: Negative. Genitourinary: Negative. Musculoskeletal: Negative. Skin: Negative. Allergic/Immunologic: Negative. Neurological: Negative. Hematological: Negative. Psychiatric/Behavioral: Negative. OBJECTIVE:    /78 (Site: Left Upper Arm, Position: Sitting, Cuff Size: Large Adult)   Pulse 99   Ht 5' 3.5\" (1.613 m)   Wt 189 lb 1.6 oz (85.8 kg)   LMP 10/18/2018 (Approximate)   SpO2 98%   BMI 32.97 kg/m²      Physical Exam  Constitutional:       Appearance: Normal appearance. HENT:      Head: Normocephalic. Nose: Nose normal.      Mouth/Throat:      Pharynx: Oropharynx is clear. Eyes:      Conjunctiva/sclera: Conjunctivae normal.      Pupils: Pupils are equal, round, and reactive to light. Cardiovascular:      Rate and Rhythm: Normal rate. Pulses: Normal pulses. Pulmonary:      Effort: Pulmonary effort is normal.      Breath sounds: Normal breath sounds. Abdominal:      General: Bowel sounds are normal.      Palpations: Abdomen is soft. Comments: incisions healing well   Musculoskeletal:         General: Normal range of motion. Cervical back: Normal range of motion. Skin:     General: Skin is warm and dry. Capillary Refill: Capillary refill takes less than 2 seconds. Neurological:      General: No focal deficit present. Mental Status: She is alert and oriented to person, place, and time. Psychiatric:         Mood and Affect: Mood normal.         Behavior: Behavior normal.         ASSESSMENT & PLAN:    1. S/P laparoscopic sleeve gastrectomy  - Doing well  - Weight loss 20 pounds since surgery  - Incisions well healed  - BM normal  - Tolerating pureed diet; may progress to soft diet  - Occasionally has dizziness with standing; especially after taking her medications.   Pt will cut Topamax dose in half to see if that helps her dizziness  - Continue vitamins; may take bariatric capsule now, instead of chewable  - RTC in one month  - Call if dizziness worsens     As of current visit, regarding obesity-related co-morbid conditions:  DEB [] compliant [] no longer using [] resolved per sleep study; hypertension [] medications; hyperlipidemia [] medications; GERD [] medications; DM [] insulin [] non-insulin [] no meds      The patient expressed understanding and willingness to comply nicely; all questions and concerns addressed. No orders of the defined types were placed in this encounter. No orders of the defined types were placed in this encounter. Follow Up:  Return in about 1 month (around 4/24/2022).     Tamika Murrell, APRN - CNP

## 2022-04-08 RX ORDER — PANTOPRAZOLE SODIUM 40 MG/1
40 TABLET, DELAYED RELEASE ORAL 2 TIMES DAILY
Qty: 28 TABLET | Refills: 0 | Status: SHIPPED | OUTPATIENT
Start: 2022-04-08 | End: 2022-04-22

## 2022-04-27 ENCOUNTER — OFFICE VISIT (OUTPATIENT)
Dept: BARIATRICS/WEIGHT MGMT | Age: 27
End: 2022-04-27
Payer: MEDICAID

## 2022-04-27 VITALS
WEIGHT: 188.2 LBS | BODY MASS INDEX: 33.35 KG/M2 | OXYGEN SATURATION: 100 % | SYSTOLIC BLOOD PRESSURE: 110 MMHG | DIASTOLIC BLOOD PRESSURE: 70 MMHG | HEIGHT: 63 IN | HEART RATE: 88 BPM

## 2022-04-27 DIAGNOSIS — K59.04 CHRONIC IDIOPATHIC CONSTIPATION: ICD-10-CM

## 2022-04-27 DIAGNOSIS — Z98.84 S/P LAPAROSCOPIC SLEEVE GASTRECTOMY: Primary | ICD-10-CM

## 2022-04-27 PROCEDURE — 1036F TOBACCO NON-USER: CPT | Performed by: NURSE PRACTITIONER

## 2022-04-27 PROCEDURE — G8428 CUR MEDS NOT DOCUMENT: HCPCS | Performed by: NURSE PRACTITIONER

## 2022-04-27 PROCEDURE — G8417 CALC BMI ABV UP PARAM F/U: HCPCS | Performed by: NURSE PRACTITIONER

## 2022-04-27 PROCEDURE — 99213 OFFICE O/P EST LOW 20 MIN: CPT | Performed by: NURSE PRACTITIONER

## 2022-04-27 RX ORDER — PANTOPRAZOLE SODIUM 20 MG/1
20 TABLET, DELAYED RELEASE ORAL
Qty: 30 TABLET | Refills: 5 | Status: SHIPPED | OUTPATIENT
Start: 2022-04-27

## 2022-04-27 RX ORDER — BUSPIRONE HYDROCHLORIDE 10 MG/1
10 TABLET ORAL 3 TIMES DAILY
COMMUNITY

## 2022-04-27 ASSESSMENT — ENCOUNTER SYMPTOMS
EYES NEGATIVE: 1
ALLERGIC/IMMUNOLOGIC NEGATIVE: 1
RESPIRATORY NEGATIVE: 1
CONSTIPATION: 1

## 2022-04-27 NOTE — PROGRESS NOTES
BARIATRIC SURGERY OFFICE PROGRESS NOTE    SUBJECTIVE:    Patient presenting today referred from MIREYA Major CNP, for   Chief Complaint   Patient presents with   [facility] Weight Management     4th p/o s/g 2/16/22    . Vitals:    04/27/22 1501   BP: 110/70   Pulse: 88   SpO2: 100%        BMI: Body mass index is 33.08 kg/m². Weight History: Wt Readings from Last 3 Encounters:   04/27/22 188 lb 3.2 oz (85.4 kg)   03/24/22 189 lb 1.6 oz (85.8 kg)   03/02/22 197 lb 6.4 oz (89.5 kg)        If within 30 days of bariatric surgery date, have you been to the ED: Дмитрий Baldwin is a 32 y.o. female presenting in  bariatric two month POST-OP visit.     Total weight loss/gain:   -0.9 lbs since last visit  -20.9 lbs since surgery  -63.3 lbs since starting program    Changes in health since last visit: increased constipation; takes senna couple times a week    Pt tracking calories: about 600-800 daily    Pt exercising: staying active and walking frequently    Pt taking vitamins: supplementing    Fluid intake: 64 oz    Past Medical History:   Diagnosis Date    37 weeks gestation of pregnancy 06/28/2016    Anemia     COVID-19 01/01/2022    Positive test (see under media)    H/O echocardiogram 05/21/2021    EF 55-60% no evidence of pericardial effusion no significant valvular regurgitation    History of blood transfusion 2014    Received 5 units after childbirth    History of postpartum hemorrhage, currently pregnant in third trimester 06/28/2016    Hx of cardiovascular stress test 05/07/2021    Normal study    Migraines     Preeclampsia 2014    Sciatica         Patient Active Problem List   Diagnosis    Diarrhea    Non morbid obesity due to excess calories    Migraine without aura and without status migrainosus, not intractable    Depression    Muscle spasm of back    Anxiety    Latex allergy    S/P laparoscopic cholecystectomy    Morbid obesity with BMI of 40.0-44.9, adult ([address]    Esophageal reflux    Morbid obesity due to excess calories [address] [address]    S/P laparoscopic sleeve gastrectomy       Past Surgical History:   Procedure Laterality Date    CHOLECYSTECTOMY  12/13/2018    DENTAL SURGERY      wisdom teeth    DILATION AND CURETTAGE OF UTERUS  2018    HYSTERECTOMY  10/2018    SALPINGECTOMY Bilateral 2016    SLEEVE GASTRECTOMY N/A 2/16/2022    GASTRECTOMY SLEEVE LAPAROSCOPIC ROBOTIC performed [facility] Maynor Amador MD at [facility] N/A 4/12/2021    EGD BIOPSY performed by Josie Knowles MD at [address] [facility] ENDOSCOPY       Current Outpatient Medications   Medication Sig Dispense Refill    busPIRone (BUSPAR) 10 MG tablet Take 10 mg by mouth 3 times daily      pantoprazole (PROTONIX) 20 MG tablet Take 1 tablet by mouth every morning (before breakfast) 30 tablet 5    pantoprazole (PROTONIX) 20 MG tablet Take 1 tablet by mouth 2 times daily 60 tablet 3    ondansetron (ZOFRAN-ODT) 4 MG disintegrating tablet Take 1 tablet by mouth 3 times daily as needed for Nausea or Vomiting 21 tablet 2    topiramate (TOPAMAX) 100 MG tablet Take 50 mg by mouth 2 times daily       EPINEPHrine (EPIPEN 2-CLEOPATRA) 0.3 MG/0.3ML SOAJ injection Use as directed for allergic reaction 2 each 0    sertraline (ZOLOFT) 50 MG tablet Take 1 tablet by mouth daily (Patient taking differently: Take 100 mg by mouth daily ) 30 tablet 5    pantoprazole (PROTONIX) 40 MG tablet Take 1 tablet by mouth 2 times daily for 14 days 28 tablet 0    nystatin (MYCOSTATIN) 690479 UNIT/GM powder Apply 3 times daily. 1 Bottle 5     No current facility-administered medications for this visit.         Allergies   Allergen Reactions    Latex Hives and Rash    Macrobid [Nitrofurantoin Macrocrystal] Hives     No SOB or closing of throat noted when pt had reaction      Ropivacaine      \"felt like bugs were crawling on my skin\"    Norco [Hydrocodone-Acetaminophen]      Makes her really sick to her stomach; hard to wake up with,it keeps her asleep    Other Rash     Prolene suture    Tape [place] Just Tape] Rash         Review of Systems   Constitutional: Negative. HENT: Negative. Eyes: Negative. Respiratory: Negative. Cardiovascular: Negative. Gastrointestinal: Positive for constipation. Endocrine: Negative. Genitourinary: Negative. Musculoskeletal: Negative. Skin: Negative. Allergic/Immunologic: Negative. Neurological: Negative. Hematological: Negative. Psychiatric/Behavioral: Negative. OBJECTIVE:    /70   Pulse 88   Ht 5' 3.25\" (1.607 m)   Wt 188 lb 3.2 oz (85.4 kg)   LMP 10/18/2018 (Approximate)   SpO2 100%   BMI 33.08 kg/m²      Physical Exam  Constitutional:       Appearance: Normal appearance. HENT:      Head: Normocephalic. Nose: Nose normal.      Mouth/Throat:      Pharynx: Oropharynx is clear. Eyes:      Conjunctiva/sclera: Conjunctivae normal.      Pupils: Pupils are equal, round, and reactive to light. Cardiovascular:      Rate and Rhythm: Normal rate. Pulses: Normal pulses. Pulmonary:      Effort: Pulmonary effort is normal.      Breath sounds: Normal breath sounds. Abdominal:      General: Bowel sounds are normal.      Palpations: Abdomen is soft. Comments: Incisions well healed  ABD slightly distended and soft   Musculoskeletal:         General: Normal range of motion. Cervical back: Normal range of motion. Skin:     General: Skin is warm and dry. Capillary Refill: Capillary refill takes less than 2 seconds. Neurological:      General: No focal deficit present. Mental Status: She is alert and oriented to person, place, and time. Psychiatric:         Mood and Affect: Mood normal.         Behavior: Behavior normal.         ASSESSMENT & PLAN:    1.  S/P laparoscopic sleeve gastrectomy  - Doing well; Down 20.9 pounds since surgery  - Incisions well healed  - c/o constipation- will increase senna dose   - Increase activity as tolerated  - Tolerating regular diet  - Patient was encouraged to journal all food intake. - Keep calorie level at approximately 600-800, per discussion / plan with registered dietician. - Protein intake is to be a minimum of 50-60 grams per day. - Water drinking was encouraged with a goal of 64oz-128oz daily. Beverages are to be calorie free except for milk. Avoid soda. - 3 month Labs  Ordered for next visit  - RTC in one month  - Protonix refilled    - CBC with Auto Differential; Future  - Comprehensive Metabolic Panel; Future  - Hemoglobin A1C; Future  - Vitamin B12 & Folate; Future  - Iron and TIBC; Future    2. Chronic idiopathic constipation  - Constipation prior to surgery  - BM every 3 days  - Increase Senna to 2 tabs every night   - May use Miralax as needed in conjunction with senna  - 64 oz water daily  - increase activity as able       As of current visit, regarding obesity-related co-morbid conditions:  DEB [] compliant [] no longer using [] resolved per sleep study; hypertension [] medications; hyperlipidemia [] medications; GERD [] medications; DM [] insulin [] non-insulin [] no meds  Stopped taking wellbutrin; buspar was decreased, topamax decreased    The patient expressed understanding and willingness to comply nicely; all questions and concerns addressed.     Orders Placed This Encounter   Medications    pantoprazole (PROTONIX) 20 MG tablet     Sig: Take 1 tablet by mouth every morning (before breakfast)     Dispense:  30 tablet     Refill:  5     Orders Placed This Encounter   Procedures    CBC with Auto Differential     Standing Status:   Future     Standing Expiration Date:   4/27/2023    Comprehensive Metabolic Panel     Standing Status:   Future     Standing Expiration Date:   4/27/2023    Hemoglobin A1C     Standing Status:   Future     Standing Expiration Date:   4/27/2023    Vitamin B12 & Folate     Standing Status:   Future     Standing Expiration Date:   4/27/2023  Iron and TIBC     Standing Status:   Future     Standing Expiration Date:   4/27/2023     Order Specific Question:   Is Patient Fasting? Answer:   yes     Order Specific Question:   No of Hours? Answer:   8       Follow Up:  Return in about 1 month (around 5/27/2022).     MIREYA Tate - CNP

## 2022-05-27 ENCOUNTER — HOSPITAL ENCOUNTER (OUTPATIENT)
Age: 27
Discharge: HOME | End: 2022-05-27
Payer: MEDICAID

## 2022-05-27 DIAGNOSIS — Z98.84 S/P LAPAROSCOPIC SLEEVE GASTRECTOMY: ICD-10-CM

## 2022-05-27 LAB
ALBUMIN SERPL-MCNC: 4.6 GM/DL (ref 3.4–5)
ALP BLD-CCNC: 58 IU/L (ref 40–129)
ALT SERPL-CCNC: 9 U/L (ref 10–40)
ANION GAP SERPL CALCULATED.3IONS-SCNC: 11 MMOL/L (ref 4–16)
AST SERPL-CCNC: 13 IU/L (ref 15–37)
BASOPHILS ABSOLUTE: 0.1 K/CU MM
BASOPHILS RELATIVE PERCENT: 0.7 % (ref 0–1)
BILIRUB SERPL-MCNC: 0.3 MG/DL (ref 0–1)
BUN BLDV-MCNC: 11 MG/DL (ref 6–23)
CALCIUM SERPL-MCNC: 9.7 MG/DL (ref 8.3–10.6)
CHLORIDE BLD-SCNC: 103 MMOL/L (ref 99–110)
CO2: 25 MMOL/L (ref 21–32)
CREAT SERPL-MCNC: 0.6 MG/DL (ref 0.6–1.1)
DIFFERENTIAL TYPE: ABNORMAL
EOSINOPHILS ABSOLUTE: 0.1 K/CU MM
EOSINOPHILS RELATIVE PERCENT: 1.4 % (ref 0–3)
ESTIMATED AVERAGE GLUCOSE: 97 MG/DL
FOLATE: 19.3 NG/ML (ref 3.1–17.5)
GFR AFRICAN AMERICAN: >60 ML/MIN/1.73M2
GFR NON-AFRICAN AMERICAN: >60 ML/MIN/1.73M2
GLUCOSE BLD-MCNC: 89 MG/DL (ref 70–99)
HBA1C MFR BLD: 5 % (ref 4.2–6.3)
HCT VFR BLD CALC: 42.2 % (ref 37–47)
HEMOGLOBIN: 13.5 GM/DL (ref 12.5–16)
IMMATURE NEUTROPHIL %: 0.1 % (ref 0–0.43)
IRON: 70 UG/DL (ref 37–145)
LYMPHOCYTES ABSOLUTE: 2.5 K/CU MM
LYMPHOCYTES RELATIVE PERCENT: 33.5 % (ref 24–44)
MCH RBC QN AUTO: 28.5 PG (ref 27–31)
MCHC RBC AUTO-ENTMCNC: 32 % (ref 32–36)
MCV RBC AUTO: 89.2 FL (ref 78–100)
MONOCYTES ABSOLUTE: 0.6 K/CU MM
MONOCYTES RELATIVE PERCENT: 8.1 % (ref 0–4)
NUCLEATED RBC %: 0 %
PCT TRANSFERRIN: 31 % (ref 10–44)
PDW BLD-RTO: 12.3 % (ref 11.7–14.9)
PLATELET # BLD: 292 K/CU MM (ref 140–440)
PMV BLD AUTO: 12.3 FL (ref 7.5–11.1)
POTASSIUM SERPL-SCNC: 4.5 MMOL/L (ref 3.5–5.1)
RBC # BLD: 4.73 M/CU MM (ref 4.2–5.4)
SEGMENTED NEUTROPHILS ABSOLUTE COUNT: 4.2 K/CU MM
SEGMENTED NEUTROPHILS RELATIVE PERCENT: 56.2 % (ref 36–66)
SODIUM BLD-SCNC: 139 MMOL/L (ref 135–145)
TOTAL IMMATURE NEUTOROPHIL: 0.01 K/CU MM
TOTAL IRON BINDING CAPACITY: 228 UG/DL (ref 250–450)
TOTAL NUCLEATED RBC: 0 K/CU MM
TOTAL PROTEIN: 6.8 GM/DL (ref 6.4–8.2)
UNSATURATED IRON BINDING CAPACITY: 158 UG/DL (ref 110–370)
VITAMIN B-12: 368.7 PG/ML (ref 211–911)
WBC # BLD: 7.4 K/CU MM (ref 4–10.5)

## 2022-05-27 PROCEDURE — 85025 COMPLETE CBC W/AUTO DIFF WBC: CPT

## 2022-05-27 PROCEDURE — 80053 COMPREHEN METABOLIC PANEL: CPT

## 2022-05-27 PROCEDURE — 83550 IRON BINDING TEST: CPT

## 2022-05-27 PROCEDURE — 36415 COLL VENOUS BLD VENIPUNCTURE: CPT

## 2022-05-27 PROCEDURE — 83036 HEMOGLOBIN GLYCOSYLATED A1C: CPT

## 2022-05-27 PROCEDURE — 82607 VITAMIN B-12: CPT

## 2022-05-27 PROCEDURE — 82746 ASSAY OF FOLIC ACID SERUM: CPT

## 2022-05-27 PROCEDURE — 83540 ASSAY OF IRON: CPT

## 2022-05-31 ENCOUNTER — OFFICE VISIT (OUTPATIENT)
Dept: BARIATRICS/WEIGHT MGMT | Age: 27
End: 2022-05-31
Payer: MEDICAID

## 2022-05-31 VITALS
OXYGEN SATURATION: 100 % | DIASTOLIC BLOOD PRESSURE: 80 MMHG | SYSTOLIC BLOOD PRESSURE: 120 MMHG | HEART RATE: 76 BPM | HEIGHT: 63 IN | WEIGHT: 184 LBS | BODY MASS INDEX: 32.6 KG/M2

## 2022-05-31 DIAGNOSIS — K59.04 CHRONIC IDIOPATHIC CONSTIPATION: ICD-10-CM

## 2022-05-31 DIAGNOSIS — Z98.84 S/P LAPAROSCOPIC SLEEVE GASTRECTOMY: Primary | ICD-10-CM

## 2022-05-31 PROCEDURE — 99213 OFFICE O/P EST LOW 20 MIN: CPT | Performed by: NURSE PRACTITIONER

## 2022-05-31 PROCEDURE — G8427 DOCREV CUR MEDS BY ELIG CLIN: HCPCS | Performed by: NURSE PRACTITIONER

## 2022-05-31 PROCEDURE — G8417 CALC BMI ABV UP PARAM F/U: HCPCS | Performed by: NURSE PRACTITIONER

## 2022-05-31 PROCEDURE — 1036F TOBACCO NON-USER: CPT | Performed by: NURSE PRACTITIONER

## 2022-05-31 ASSESSMENT — ENCOUNTER SYMPTOMS
RESPIRATORY NEGATIVE: 1
ALLERGIC/IMMUNOLOGIC NEGATIVE: 1
EYES NEGATIVE: 1
CONSTIPATION: 1

## 2022-05-31 NOTE — PROGRESS NOTES
BARIATRIC SURGERY OFFICE PROGRESS NOTE    SUBJECTIVE:    Patient presenting today referred from MIREYA Sykes CNP, for   Chief Complaint   Patient presents with   [facility]     s/p s/g 02/16/22    . Vitals:    05/31/22 1116   BP: 120/80   Pulse: 76   SpO2: 100%        BMI: Body mass index is 32.34 kg/m². Weight History: Wt Readings from Last 3 Encounters:   05/31/22 184 lb (83.5 kg)   04/27/22 188 lb 3.2 oz (85.4 kg)   03/24/22 189 lb 1.6 oz (85.8 kg)        If within 30 days of bariatric surgery date, have you been to the ED: Faisal Holden is a 32 y.o. female presenting in three  month bariatric POST-OP visit.     Total weight loss/gain:   -4.2 lbs since last visit  -25.1 lbs since surgery  -59.1 lbs since starting program    Changes in health since last visit: worsening constipation    Pt tracking calories: 600-800 daily    Pt exercising: staying busy    Pt taking vitamins: supplementing/ on iron as well    Fluid intake: 60 oz daily    Past Medical History:   Diagnosis Date    37 weeks gestation of pregnancy 06/28/2016    Anemia     COVID-19 01/01/2022    Positive test (see under media)    H/O echocardiogram 05/21/2021    EF 55-60% no evidence of pericardial effusion no significant valvular regurgitation    History of blood transfusion 2014    Received 5 units after childbirth    History of postpartum hemorrhage, currently pregnant in third trimester 06/28/2016    Hx of cardiovascular stress test 05/07/2021    Normal study    Migraines     Preeclampsia 2014    Sciatica         Patient Active Problem List   Diagnosis    Diarrhea    Non morbid obesity due to excess calories    Migraine without aura and without status migrainosus, not intractable    Depression    Muscle spasm of back    Anxiety    Latex allergy    S/P laparoscopic cholecystectomy    Morbid obesity with BMI of 40.0-44.9, adult ([address]    Esophageal reflux    Morbid obesity due to excess calories ([address]    S/P laparoscopic sleeve gastrectomy       Past Surgical History:   Procedure Laterality Date    CHOLECYSTECTOMY  12/13/2018    DENTAL SURGERY      wisdom teeth    DILATION AND CURETTAGE OF UTERUS  2018    HYSTERECTOMY  10/2018    SALPINGECTOMY Bilateral 2016    SLEEVE GASTRECTOMY N/A 2/16/2022    GASTRECTOMY SLEEVE LAPAROSCOPIC ROBOTIC performed by Yanick Mcfarland MD at [address] [facility] N/A 4/12/2021    EGD BIOPSY performed by Margarita Shepherd MD at [address] [facility] ENDOSCOPY       Current Outpatient Medications   Medication Sig Dispense Refill    busPIRone (BUSPAR) 10 MG tablet Take 10 mg by mouth 3 times daily (Patient not taking: Reported on 5/31/2022)      pantoprazole (PROTONIX) 20 MG tablet Take 1 tablet by mouth every morning (before breakfast) 30 tablet 5    pantoprazole (PROTONIX) 40 MG tablet Take 1 tablet by mouth 2 times daily for 14 days 28 tablet 0    pantoprazole (PROTONIX) 20 MG tablet Take 1 tablet by mouth 2 times daily (Patient not taking: Reported on 5/31/2022) 60 tablet 3    ondansetron (ZOFRAN-ODT) 4 MG disintegrating tablet Take 1 tablet by mouth 3 times daily as needed for Nausea or Vomiting (Patient not taking: Reported on 5/31/2022) 21 tablet 2    topiramate (TOPAMAX) 100 MG tablet Take 50 mg by mouth 2 times daily  (Patient not taking: Reported on 5/31/2022)      EPINEPHrine (EPIPEN 2-CLEOPATRA) 0.3 MG/0.3ML SOAJ injection Use as directed for allergic reaction (Patient not taking: Reported on 5/31/2022) 2 each 0    nystatin (MYCOSTATIN) 397005 UNIT/GM powder Apply 3 times daily. 1 Bottle 5    sertraline (ZOLOFT) 50 MG tablet Take 1 tablet by mouth daily (Patient not taking: Reported on 5/31/2022) 30 tablet 5     No current facility-administered medications for this visit.         Allergies   Allergen Reactions    Latex Hives and Rash    Macrobid [Nitrofurantoin Macrocrystal] Hives     No SOB or closing of throat noted when pt had reaction  Ropivacaine      \"felt like bugs were crawling on my skin\"    Norco [Hydrocodone-Acetaminophen]      Makes her really sick to her stomach; hard to wake up with,it keeps her asleep    Other Rash     Prolene suture    Tape Gabriele Ileana Tape] Rash         Review of Systems   Constitutional: Negative. HENT: Negative. Eyes: Negative. Respiratory: Negative. Cardiovascular: Negative. Gastrointestinal: Positive for constipation. Endocrine: Negative. Genitourinary: Negative. Musculoskeletal: Negative. Skin: Negative. Allergic/Immunologic: Negative. Neurological: Negative. Hematological: Negative. Psychiatric/Behavioral: Negative. OBJECTIVE:    /80   Pulse 76   Ht 5' 3.25\" (1.607 m)   Wt 184 lb (83.5 kg)   LMP 10/18/2018 (Approximate)   SpO2 100%   BMI 32.34 kg/m²      Physical Exam  Constitutional:       Appearance: Normal appearance. HENT:      Head: Normocephalic. Nose: Nose normal.      Mouth/Throat:      Pharynx: Oropharynx is clear. Eyes:      Conjunctiva/sclera: Conjunctivae normal.      Pupils: Pupils are equal, round, and reactive to light. Cardiovascular:      Rate and Rhythm: Normal rate. Pulses: Normal pulses. Pulmonary:      Effort: Pulmonary effort is normal.      Breath sounds: Normal breath sounds. Abdominal:      General: Bowel sounds are normal.      Tenderness: There is abdominal tenderness. Comments: Tenderness mid ABD at times  Incisions well healed   Musculoskeletal:         General: Normal range of motion. Cervical back: Normal range of motion. Skin:     General: Skin is warm and dry. Capillary Refill: Capillary refill takes less than 2 seconds. Neurological:      General: No focal deficit present. Mental Status: She is alert and oriented to person, place, and time. Psychiatric:         Mood and Affect: Mood normal.         Behavior: Behavior normal.         ASSESSMENT & PLAN:    1.  S/P laparoscopic sleeve gastrectomy  - Doing well; Down 25.1 pounds since surgery/ 59.1 overall  - Incisions well healed  - Struggling with constipation  - Tolerating regular diet  - Patient was encouraged to journal all food intake. - Keep calorie level at approximately 600-800, per discussion / plan with registered dietician. - Protein intake is to be a minimum of 50-60 grams per day. - Water drinking was encouraged with a goal of 64oz-128oz daily. Beverages are to be calorie free except for milk. Avoid soda. - Recent Labs reviewed and WNL  - RTC in one month    2. Chronic idiopathic constipation  - Struggling with bowel movements  - Bm once a week/ 6 times this past month  - Using Dulcolax and Senna daily with minimal results  - May use Miralax daily as needed in conjunction with Senna  - Will refer to GI specialist       As of current visit, regarding obesity-related co-morbid conditions:  DEB [] compliant [] no longer using [] resolved per sleep study; hypertension [] medications; hyperlipidemia [] medications; GERD [] medications; DM [] insulin [] non-insulin [] no meds      The patient expressed understanding and willingness to comply nicely; all questions and concerns addressed. No orders of the defined types were placed in this encounter. No orders of the defined types were placed in this encounter. Follow Up:  Return in about 1 month (around 6/30/2022).     MIREYA Frias - CNP

## 2022-07-14 ENCOUNTER — OFFICE VISIT (OUTPATIENT)
Dept: BARIATRICS/WEIGHT MGMT | Age: 27
End: 2022-07-14
Payer: MEDICAID

## 2022-07-14 VITALS
OXYGEN SATURATION: 92 % | HEART RATE: 90 BPM | SYSTOLIC BLOOD PRESSURE: 120 MMHG | BODY MASS INDEX: 31.84 KG/M2 | DIASTOLIC BLOOD PRESSURE: 68 MMHG | HEIGHT: 63 IN | WEIGHT: 179.7 LBS

## 2022-07-14 DIAGNOSIS — Z98.84 S/P LAPAROSCOPIC SLEEVE GASTRECTOMY: Primary | ICD-10-CM

## 2022-07-14 DIAGNOSIS — G43.009 MIGRAINE WITHOUT AURA AND WITHOUT STATUS MIGRAINOSUS, NOT INTRACTABLE: ICD-10-CM

## 2022-07-14 PROCEDURE — G8417 CALC BMI ABV UP PARAM F/U: HCPCS | Performed by: NURSE PRACTITIONER

## 2022-07-14 PROCEDURE — 99213 OFFICE O/P EST LOW 20 MIN: CPT | Performed by: NURSE PRACTITIONER

## 2022-07-14 PROCEDURE — G8427 DOCREV CUR MEDS BY ELIG CLIN: HCPCS | Performed by: NURSE PRACTITIONER

## 2022-07-14 PROCEDURE — 1036F TOBACCO NON-USER: CPT | Performed by: NURSE PRACTITIONER

## 2022-07-14 RX ORDER — TOPIRAMATE 50 MG/1
25 TABLET, FILM COATED ORAL DAILY
Qty: 30 TABLET | Refills: 1 | Status: SHIPPED | OUTPATIENT
Start: 2022-07-14

## 2022-07-14 ASSESSMENT — ENCOUNTER SYMPTOMS
BACK PAIN: 0
COLOR CHANGE: 0
CHEST TIGHTNESS: 0
SHORTNESS OF BREATH: 0
APNEA: 0
DIARRHEA: 0
SORE THROAT: 0
WHEEZING: 0
PHOTOPHOBIA: 0
NAUSEA: 0

## 2022-07-14 ASSESSMENT — PATIENT HEALTH QUESTIONNAIRE - PHQ9
1. LITTLE INTEREST OR PLEASURE IN DOING THINGS: 0
SUM OF ALL RESPONSES TO PHQ9 QUESTIONS 1 & 2: 0
2. FEELING DOWN, DEPRESSED OR HOPELESS: 0
SUM OF ALL RESPONSES TO PHQ QUESTIONS 1-9: 0

## 2022-07-14 NOTE — PROGRESS NOTES
BARIATRIC SURGERY OFFICE PROGRESS NOTE    SUBJECTIVE:    Patient presenting today referred from MIREYA Grier CNP, for   Chief Complaint   Patient presents with   [facility]     5 mo f/u s/p [facility] surg 02/16/22   . Vitals:    07/14/22 1112   BP: 120/68   Pulse: 90   SpO2: 92%        BMI: Body mass index is 31.83 kg/m². Weight History: Wt Readings from Last 3 Encounters:   07/14/22 179 lb 11.2 oz (81.5 kg)   05/31/22 184 lb (83.5 kg)   04/27/22 188 lb 3.2 oz (85.4 kg)        If within 30 days of bariatric surgery date, have you been to the ED: N/A, No, Yes - NO      HPI:Estefani Rene is a 32 y.o. female presenting in sixth bariatric POST-OP visit. Total weight loss/gain:   -4.3 lbs since last visit  -29.4 lbs since surgery  -63.94 lbs since starting program    Changes in health since last visit: having migraines. Pt tracking calories: tracking calories.  800 a day    Pt exercising: staying active    Pt taking vitamins: yes    Fluid intake: 60 ounces a day      Past Medical History:   Diagnosis Date    37 weeks gestation of pregnancy 06/28/2016    Anemia     COVID-19 01/01/2022    Positive test (see under media)    H/O echocardiogram 05/21/2021    EF 55-60% no evidence of pericardial effusion no significant valvular regurgitation    History of blood transfusion 2014    Received 5 units after childbirth    History of postpartum hemorrhage, currently pregnant in third trimester 06/28/2016    Hx of cardiovascular stress test 05/07/2021    Normal study    Migraines     Preeclampsia 2014    Sciatica         Patient Active Problem List   Diagnosis    Diarrhea    Non morbid obesity due to excess calories    Migraine without aura and without status migrainosus, not intractable    Depression    Muscle spasm of back    Anxiety    Latex allergy    S/P laparoscopic cholecystectomy    Morbid obesity with BMI of 40.0-44.9, adult ([address]    Esophageal reflux    Morbid obesity due to excess calories ([address]    S/P laparoscopic sleeve gastrectomy       Past Surgical History:   Procedure Laterality Date    CHOLECYSTECTOMY  12/13/2018    DENTAL SURGERY      wisdom teeth    DILATION AND CURETTAGE OF UTERUS  2018    HYSTERECTOMY  10/2018    LEG AMPUTATION N/A 2/16/2022    GASTRECTOMY SLEEVE LAPAROSCOPIC ROBOTIC performed by Benji Perez MD at [facility] SALPINGECTOMY Bilateral 2016    UPPER GASTROINTESTINAL ENDOSCOPY N/A 4/12/2021    EGD BIOPSY performed by Leda Eaton MD at [facility] ENDOSCOPY       Current Outpatient Medications   Medication Sig Dispense Refill    topiramate (TOPAMAX) 50 MG tablet Take 0.5 tablets by mouth daily 30 tablet 1    EPINEPHrine (EPIPEN 2-CLEOPATRA) 0.3 MG/0.3ML SOAJ injection Use as directed for allergic reaction 2 each 0    busPIRone (BUSPAR) 10 MG tablet Take 10 mg by mouth 3 times daily (Patient not taking: Reported on 5/31/2022)      pantoprazole (PROTONIX) 20 MG tablet Take 1 tablet by mouth every morning (before breakfast) (Patient not taking: Reported on 7/14/2022) 30 tablet 5    pantoprazole (PROTONIX) 40 MG tablet Take 1 tablet by mouth 2 times daily for 14 days 28 tablet 0    pantoprazole (PROTONIX) 20 MG tablet Take 1 tablet by mouth 2 times daily (Patient not taking: Reported on 5/31/2022) 60 tablet 3    ondansetron (ZOFRAN-ODT) 4 MG disintegrating tablet Take 1 tablet by mouth 3 times daily as needed for Nausea or Vomiting (Patient not taking: Reported on 5/31/2022) 21 tablet 2    topiramate (TOPAMAX) 100 MG tablet Take 50 mg by mouth 2 times daily  (Patient not taking: Reported on 5/31/2022)      nystatin (MYCOSTATIN) [id number] UNIT/GM powder Apply 3 times daily. 1 Bottle 5    sertraline (ZOLOFT) 50 MG tablet Take 1 tablet by mouth daily (Patient not taking: Reported on 5/31/2022) 30 tablet 5     No current facility-administered medications for this visit.         Allergies   Allergen Reactions    Latex Hives and Rash    Macrobid [Nitrofurantoin Macrocrystal] Hives     No SOB or closing of throat noted when pt had reaction      Ropivacaine      \"felt like bugs were crawling on my skin\"    Norco [Hydrocodone-Acetaminophen]      Makes her really sick to her stomach; hard to wake up with,it keeps her asleep    Other Rash     Prolene suture    Tape Gladys Fennimore Tape] Rash         Review of Systems   Constitutional: Negative for fatigue and fever. HENT: Negative for congestion, dental problem and sore throat. Eyes: Negative for photophobia and visual disturbance. Respiratory: Negative for apnea, chest tightness, shortness of breath and wheezing. Cardiovascular: Negative for chest pain and leg swelling. Gastrointestinal: Negative for diarrhea and nausea. Endocrine: Negative for cold intolerance and heat intolerance. Genitourinary: Negative for difficulty urinating, dysuria, flank pain, frequency and hematuria. Musculoskeletal: Negative for arthralgias and back pain. Skin: Negative for color change, rash and wound. Allergic/Immunologic: Negative for environmental allergies, food allergies and immunocompromised state. Neurological: Negative for dizziness, weakness, light-headedness and numbness. Hematological: Negative for adenopathy. Does not bruise/bleed easily. Psychiatric/Behavioral: Negative for behavioral problems, confusion, sleep disturbance and suicidal ideas. OBJECTIVE:    /68   Pulse 90   Ht 5' 3\" (1.6 m)   Wt 179 lb 11.2 oz (81.5 kg)   LMP 10/18/2018 (Approximate)   SpO2 92%   BMI 31.83 kg/m²      Physical Exam  Vitals reviewed. Constitutional:       Appearance: She is obese. HENT:      Head: Normocephalic and atraumatic. Right Ear: External ear normal.      Left Ear: External ear normal.      Nose: Nose normal.      Mouth/Throat:      Mouth: Mucous membranes are moist.   Eyes:      Extraocular Movements: Extraocular movements intact.       Pupils: Pupils are equal, round, and reactive to light. Cardiovascular:      Rate and Rhythm: Normal rate and regular rhythm. Pulses: Normal pulses. Heart sounds: Normal heart sounds. Pulmonary:      Effort: Pulmonary effort is normal.      Breath sounds: Normal breath sounds. Abdominal:      General: Bowel sounds are normal.   Musculoskeletal:         General: Normal range of motion. Cervical back: Normal range of motion and neck supple. Skin:     General: Skin is warm and dry. Neurological:      General: No focal deficit present. Mental Status: She is alert and oriented to person, place, and time. Mental status is at baseline. Psychiatric:         Mood and Affect: Mood normal.         Behavior: Behavior normal.       ASSESSMENT & PLAN:    1. S/P laparoscopic sleeve gastrectomy  - Doing well  - Tracking calories appropriately  - Increase activity  - Reviewed labs  - RTC 3 months    2. Migraine without aura and without status migrainosus, not intractable  - Okay to resume topamax     As of current visit, regarding obesity-related co-morbid conditions:  DEB [] compliant [] no longer using [] resolved per sleep study; hypertension [] medications; hyperlipidemia [] medications; GERD [] medications; DM [] insulin [] non-insulin [] no meds    The patient expressed understanding and willingness to comply nicely; all questions and concerns addressed.     Orders Placed This Encounter   Medications    topiramate (TOPAMAX) 50 MG tablet     Sig: Take 0.5 tablets by mouth daily     Dispense:  30 tablet     Refill:  1     Orders Placed This Encounter   Procedures    CBC with Auto Differential     Standing Status:   Future     Standing Expiration Date:   7/14/2023    Comprehensive Metabolic Panel     Standing Status:   Future     Standing Expiration Date:   7/14/2023    Hemoglobin A1C     Standing Status:   Future     Standing Expiration Date:   7/14/2023    Iron and TIBC     Standing Status:   Future     Standing Expiration Date: 7/14/2023    Lipid Panel     Standing Status:   Future     Standing Expiration Date:   7/14/2023    Magnesium     Standing Status:   Future     Standing Expiration Date:   7/14/2023    TSH with Reflex     Standing Status:   Future     Standing Expiration Date:   7/14/2023    Vitamin B1     Standing Status:   Future     Standing Expiration Date:   7/14/2023    Vitamin B12 & Folate     Standing Status:   Future     Standing Expiration Date:   7/14/2023    Vitamin D 25 Hydroxy     Standing Status:   Future     Standing Expiration Date:   7/14/2023    Zinc     Standing Status:   Future     Standing Expiration Date:   7/14/2023       Follow Up:  Return in about 3 months (around 10/14/2022) for weight check.     Luis Oneal, APRN - CNP

## 2022-10-11 ENCOUNTER — OFFICE VISIT (OUTPATIENT)
Dept: OBGYN | Age: 27
End: 2022-10-11
Payer: MEDICAID

## 2022-10-11 ENCOUNTER — HOSPITAL ENCOUNTER (OUTPATIENT)
Age: 27
Discharge: HOME | End: 2022-10-11
Payer: MEDICAID

## 2022-10-11 VITALS
HEIGHT: 63 IN | SYSTOLIC BLOOD PRESSURE: 98 MMHG | DIASTOLIC BLOOD PRESSURE: 66 MMHG | BODY MASS INDEX: 30.48 KG/M2 | WEIGHT: 172 LBS

## 2022-10-11 DIAGNOSIS — Z98.84 S/P LAPAROSCOPIC SLEEVE GASTRECTOMY: ICD-10-CM

## 2022-10-11 DIAGNOSIS — N94.10 DYSPAREUNIA, FEMALE: ICD-10-CM

## 2022-10-11 DIAGNOSIS — R68.82 DECREASED LIBIDO: ICD-10-CM

## 2022-10-11 DIAGNOSIS — Z01.419 WOMEN'S ANNUAL ROUTINE GYNECOLOGICAL EXAMINATION: Primary | ICD-10-CM

## 2022-10-11 DIAGNOSIS — R23.2 HOT FLASHES: ICD-10-CM

## 2022-10-11 DIAGNOSIS — R45.86 MOOD SWINGS: ICD-10-CM

## 2022-10-11 LAB
ALBUMIN SERPL-MCNC: 4.6 GM/DL (ref 3.4–5)
ALP BLD-CCNC: 53 IU/L (ref 40–129)
ALT SERPL-CCNC: 11 U/L (ref 10–40)
ANION GAP SERPL CALCULATED.3IONS-SCNC: 9 MMOL/L (ref 4–16)
AST SERPL-CCNC: 13 IU/L (ref 15–37)
BASOPHILS ABSOLUTE: 0 K/CU MM
BASOPHILS RELATIVE PERCENT: 0.4 % (ref 0–1)
BILIRUB SERPL-MCNC: 0.4 MG/DL (ref 0–1)
BUN BLDV-MCNC: 13 MG/DL (ref 6–23)
CALCIUM SERPL-MCNC: 10.1 MG/DL (ref 8.3–10.6)
CHLORIDE BLD-SCNC: 106 MMOL/L (ref 99–110)
CHOLESTEROL: 157 MG/DL
CO2: 24 MMOL/L (ref 21–32)
CREAT SERPL-MCNC: 0.7 MG/DL (ref 0.6–1.1)
DIFFERENTIAL TYPE: ABNORMAL
EOSINOPHILS ABSOLUTE: 0.1 K/CU MM
EOSINOPHILS RELATIVE PERCENT: 1.1 % (ref 0–3)
ESTIMATED AVERAGE GLUCOSE: 91 MG/DL
FOLATE: >20 NG/ML (ref 3.1–17.5)
GFR AFRICAN AMERICAN: >60 ML/MIN/1.73M2
GFR NON-AFRICAN AMERICAN: >60 ML/MIN/1.73M2
GLUCOSE BLD-MCNC: 81 MG/DL (ref 70–99)
HBA1C MFR BLD: 4.8 % (ref 4.2–6.3)
HCT VFR BLD CALC: 39.1 % (ref 37–47)
HDLC SERPL-MCNC: 55 MG/DL
HEMOGLOBIN: 13.2 GM/DL (ref 12.5–16)
IMMATURE NEUTROPHIL %: 0.3 % (ref 0–0.43)
LDL CHOLESTEROL CALCULATED: 93 MG/DL
LYMPHOCYTES ABSOLUTE: 2.3 K/CU MM
LYMPHOCYTES RELATIVE PERCENT: 30.4 % (ref 24–44)
MAGNESIUM: 2.2 MG/DL (ref 1.8–2.4)
MCH RBC QN AUTO: 28.9 PG (ref 27–31)
MCHC RBC AUTO-ENTMCNC: 33.8 % (ref 32–36)
MCV RBC AUTO: 85.7 FL (ref 78–100)
MONOCYTES ABSOLUTE: 0.4 K/CU MM
MONOCYTES RELATIVE PERCENT: 5.2 % (ref 0–4)
NUCLEATED RBC %: 0 %
PDW BLD-RTO: 12.4 % (ref 11.7–14.9)
PLATELET # BLD: 300 K/CU MM (ref 140–440)
PMV BLD AUTO: 12.2 FL (ref 7.5–11.1)
POTASSIUM SERPL-SCNC: 4.2 MMOL/L (ref 3.5–5.1)
RBC # BLD: 4.56 M/CU MM (ref 4.2–5.4)
SEGMENTED NEUTROPHILS ABSOLUTE COUNT: 4.7 K/CU MM
SEGMENTED NEUTROPHILS RELATIVE PERCENT: 62.6 % (ref 36–66)
SODIUM BLD-SCNC: 139 MMOL/L (ref 135–145)
TOTAL IMMATURE NEUTOROPHIL: 0.02 K/CU MM
TOTAL NUCLEATED RBC: 0 K/CU MM
TOTAL PROTEIN: 6.8 GM/DL (ref 6.4–8.2)
TRIGL SERPL-MCNC: 45 MG/DL
TSH HIGH SENSITIVITY: 0.92 UIU/ML (ref 0.27–4.2)
VITAMIN B-12: 408.6 PG/ML (ref 211–911)
VITAMIN D 25-HYDROXY: 32.59 NG/ML
WBC # BLD: 7.5 K/CU MM (ref 4–10.5)

## 2022-10-11 PROCEDURE — 84443 ASSAY THYROID STIM HORMONE: CPT

## 2022-10-11 PROCEDURE — 82306 VITAMIN D 25 HYDROXY: CPT

## 2022-10-11 PROCEDURE — 84630 ASSAY OF ZINC: CPT

## 2022-10-11 PROCEDURE — 36415 COLL VENOUS BLD VENIPUNCTURE: CPT

## 2022-10-11 PROCEDURE — 82607 VITAMIN B-12: CPT

## 2022-10-11 PROCEDURE — 82746 ASSAY OF FOLIC ACID SERUM: CPT

## 2022-10-11 PROCEDURE — 80061 LIPID PANEL: CPT

## 2022-10-11 PROCEDURE — 85025 COMPLETE CBC W/AUTO DIFF WBC: CPT

## 2022-10-11 PROCEDURE — 80053 COMPREHEN METABOLIC PANEL: CPT

## 2022-10-11 PROCEDURE — 83735 ASSAY OF MAGNESIUM: CPT

## 2022-10-11 PROCEDURE — 99395 PREV VISIT EST AGE 18-39: CPT | Performed by: NURSE PRACTITIONER

## 2022-10-11 PROCEDURE — 84425 ASSAY OF VITAMIN B-1: CPT

## 2022-10-11 PROCEDURE — 83550 IRON BINDING TEST: CPT

## 2022-10-11 PROCEDURE — 83540 ASSAY OF IRON: CPT

## 2022-10-11 PROCEDURE — 83036 HEMOGLOBIN GLYCOSYLATED A1C: CPT

## 2022-10-11 PROCEDURE — G8484 FLU IMMUNIZE NO ADMIN: HCPCS | Performed by: NURSE PRACTITIONER

## 2022-10-11 RX ORDER — CALCIUM CARBONATE 500(1250)
500 TABLET ORAL DAILY
COMMUNITY

## 2022-10-11 SDOH — ECONOMIC STABILITY: FOOD INSECURITY: WITHIN THE PAST 12 MONTHS, THE FOOD YOU BOUGHT JUST DIDN'T LAST AND YOU DIDN'T HAVE MONEY TO GET MORE.: NEVER TRUE

## 2022-10-11 SDOH — ECONOMIC STABILITY: TRANSPORTATION INSECURITY
IN THE PAST 12 MONTHS, HAS THE LACK OF TRANSPORTATION KEPT YOU FROM MEDICAL APPOINTMENTS OR FROM GETTING MEDICATIONS?: NO

## 2022-10-11 SDOH — ECONOMIC STABILITY: FOOD INSECURITY: WITHIN THE PAST 12 MONTHS, YOU WORRIED THAT YOUR FOOD WOULD RUN OUT BEFORE YOU GOT MONEY TO BUY MORE.: NEVER TRUE

## 2022-10-11 SDOH — ECONOMIC STABILITY: TRANSPORTATION INSECURITY
IN THE PAST 12 MONTHS, HAS LACK OF TRANSPORTATION KEPT YOU FROM MEETINGS, WORK, OR FROM GETTING THINGS NEEDED FOR DAILY LIVING?: NO

## 2022-10-11 ASSESSMENT — ENCOUNTER SYMPTOMS
RESPIRATORY NEGATIVE: 1
SHORTNESS OF BREATH: 0
ABDOMINAL PAIN: 0
NAUSEA: 0
GASTROINTESTINAL NEGATIVE: 1
DIARRHEA: 0
VOMITING: 0
CONSTIPATION: 0

## 2022-10-11 ASSESSMENT — SOCIAL DETERMINANTS OF HEALTH (SDOH): HOW HARD IS IT FOR YOU TO PAY FOR THE VERY BASICS LIKE FOOD, HOUSING, MEDICAL CARE, AND HEATING?: NOT HARD AT ALL

## 2022-10-11 NOTE — PROGRESS NOTES
10/11/22    [facility]  1995    Chief Complaint   Patient presents with    Gynecologic Exam     27 yr old has had hysterectomy, no ovaries removed, is sexual active, last pap 2016 normal.    Dyspareunia     Pt c/o worsening dyspareunia during and after that's a burning pain over the last year. Pt c/o dryness and decreased libido. Pt also c/o fatigue and mood swings and hot flashes. The patient is a 32 y.o. female, Radha Quintero who presents for her annual exam. She is not menopausal . She reports additional symptoms of dyspareunia. She has had a hysterectomy without removal of ovaries. She is  sexually active. Pap smear history: Her last PAP smear was in 2016.   Her results were normal.    Past Medical History:   Diagnosis Date    37 weeks gestation of pregnancy 06/28/2016    Anemia     COVID-19 01/01/2022    Positive test (see under media)    Diverticulosis     Dyspareunia in female     Fatigue     H/O echocardiogram 05/21/2021    EF 55-60% no evidence of pericardial effusion no significant valvular regurgitation    History of blood transfusion 2014    Received 5 units after childbirth    History of postpartum hemorrhage, currently pregnant in third trimester 06/28/2016    Hx of cardiovascular stress test 05/07/2021    Normal study    Irritable bowel syndrome with constipation     Menopausal symptoms     Migraines     Preeclampsia 2014    Sciatica        Past Surgical History:   Procedure Laterality Date    CHOLECYSTECTOMY  12/13/2018    COLONOSCOPY      DENTAL SURGERY      wisdom teeth    DILATION AND CURETTAGE OF UTERUS  2018    HYSTERECTOMY (CERVIX STATUS UNKNOWN)  10/2018    SALPINGECTOMY Bilateral 2016    SLEEVE GASTRECTOMY N/A 02/16/2022    GASTRECTOMY SLEEVE LAPAROSCOPIC ROBOTIC performed by Joana Stinson MD at [address] N/A 04/12/2021    EGD BIOPSY performed by Jorge Deng MD at [facility] ENDOSCOPY       Family History   Problem Relation Age of Onset    Heart Disease Mother     Cancer Mother     Uterine Cancer Mother     Diabetes Father     High Blood Pressure Father     Coronary Art Dis Father     Heart Disease Brother     High Blood Pressure Brother     Learning Disabilities Brother     Diabetes Brother     Cancer Maternal Grandfather     Breast Cancer Maternal Great Grandmother        Social History     Tobacco Use    Smoking status: Never    Smokeless tobacco: Never   Vaping Use    Vaping Use: Never used   Substance Use Topics    Alcohol use: Yes     Comment: rarely    Drug use: Not Currently     Frequency: 7.0 times per week     Comment: CBD GUMMIES       Current Outpatient Medications   Medication Sig Dispense Refill    calcium carbonate (OSCAL) 500 MG TABS tablet Take 500 mg by mouth daily      Multiple Vitamin (MULTIVITAMIN ADULT PO) Take by mouth      Probiotic Product (PROBIOTIC-10 PO) Take by mouth      FIBER ADULT GUMMIES PO Take by mouth      Docusate Sodium (STOOL SOFTENER LAXATIVE PO) Take by mouth      topiramate (TOPAMAX) 50 MG tablet Take 0.5 tablets by mouth daily 30 tablet 1    busPIRone (BUSPAR) 10 MG tablet Take 10 mg by mouth 3 times daily (Patient not taking: Reported on 5/31/2022)      pantoprazole (PROTONIX) 20 MG tablet Take 1 tablet by mouth every morning (before breakfast) (Patient not taking: Reported on 7/14/2022) 30 tablet 5    pantoprazole (PROTONIX) 40 MG tablet Take 1 tablet by mouth 2 times daily for 14 days 28 tablet 0    pantoprazole (PROTONIX) 20 MG tablet Take 1 tablet by mouth 2 times daily (Patient not taking: Reported on 5/31/2022) 60 tablet 3    ondansetron (ZOFRAN-ODT) 4 MG disintegrating tablet Take 1 tablet by mouth 3 times daily as needed for Nausea or Vomiting (Patient not taking: Reported on 5/31/2022) 21 tablet 2    topiramate (TOPAMAX) 100 MG tablet Take 50 mg by mouth 2 times daily  (Patient not taking: Reported on 5/31/2022)      EPINEPHrine (EPIPEN 2-CLEOPATRA) 0.3 MG/0.3ML SOAJ injection Use as directed for allergic reaction 2 each 0    nystatin (MYCOSTATIN) 863518 UNIT/GM powder Apply 3 times daily. 1 Bottle 5    sertraline (ZOLOFT) 50 MG tablet Take 1 tablet by mouth daily (Patient not taking: Reported on 2022) 30 tablet 5     No current facility-administered medications for this visit. Allergies   Allergen Reactions    Latex Hives and Rash    Macrobid [Nitrofurantoin Macrocrystal] Hives     No SOB or closing of throat noted when pt had reaction      Ropivacaine      \"felt like bugs were crawling on my skin\"    Norco [Hydrocodone-Acetaminophen]      Makes her really sick to her stomach; hard to wake up with,it keeps her asleep    Other Rash     Prolene suture    Tape [Adhesive Tape] Rash           Immunization History   Administered Date(s) Administered    DTaP 1993, 1993, 01/10/1994, 1995, 1999    HPV Bivalent (Cervarix) 2010, 2011, 2012    Hepatitis A 2010, 2011    Hepatitis B 1993, 1993, 1993    Hepatitis B (Recombivax HB) 1993, 1993, 1993    Hib PRP-OMP (PedvaxHIB) 1993, 1993, 01/10/1994, 1995    Hib, unspecified 1993, 1993, 01/10/1994, 1995    Influenza Virus Vaccine 10/27/2015, 10/25/2018    Influenza, AFLURIA (age 1 yrs+), FLUZONE, (age 10 mo+), MDV, 0.5mL 10/12/2016    MMR 1995, 1999    Meningococcal ACWY Vaccine 2010    PPD Test 05/15/2013, 2013    Polio IPV (IPOL) 1993, 1993, 1995, 1999    Tdap (Boostrix, Adacel) 2010, 2013, 2014, 2016    Varicella (Varivax) 1997, 2010       Review of Systems   Constitutional: Negative. Negative for fatigue. Respiratory: Negative. Negative for shortness of breath. Gastrointestinal: Negative. Negative for abdominal pain, constipation, diarrhea, nausea and vomiting. Genitourinary:  Positive for dyspareunia.    Neurological:  Negative for dizziness. Psychiatric/Behavioral: Negative. BP 98/66   Ht 5' 3\" (1.6 m)   Wt 172 lb (78 kg)   LMP 10/18/2018 (Approximate)   BMI 30.47 kg/m²     Physical Exam  Vitals and nursing note reviewed. Constitutional:       Appearance: Normal appearance. She is obese. HENT:      Head: Normocephalic. Pulmonary:      Effort: Pulmonary effort is normal. No respiratory distress. Chest:   Breasts:     Right: Normal.      Left: Normal.   Abdominal:      Palpations: Abdomen is soft. Tenderness: There is no abdominal tenderness. Genitourinary:     General: Normal vulva. Exam position: Lithotomy position. Vagina: Normal.      Cervix: Normal.      Uterus: Normal.       Adnexa: Left adnexa normal.        Right: Tenderness present. Rectum: Normal.   Musculoskeletal:         General: Normal range of motion. Cervical back: Normal and normal range of motion. Lumbar back: Normal.   Lymphadenopathy:      Cervical: No cervical adenopathy. Upper Body:      Right upper body: No supraclavicular or axillary adenopathy. Left upper body: No supraclavicular or axillary adenopathy. Lower Body: No right inguinal adenopathy. No left inguinal adenopathy. Skin:     General: Skin is warm and dry. Neurological:      General: No focal deficit present. Mental Status: She is alert and oriented to person, place, and time. Psychiatric:         Attention and Perception: Attention normal.         Mood and Affect: Mood normal.         Speech: Speech normal.         Behavior: Behavior is cooperative. Cognition and Memory: Cognition normal.         Judgment: Judgment normal.       No results found for this visit on 10/11/22. Assessment and Plan   Diagnosis Orders   1. Women's annual routine gynecological examination        2. Dyspareunia, female  US NON OB TRANSVAGINAL      3. Decreased libido        4. Mood swings        5.  Hot flashes          U/s with physician f/u  Discussed vaginally estrogen      Return in about 1 week (around 10/18/2022).     Tao Coffey, APRN - CNP

## 2022-10-13 ENCOUNTER — OFFICE VISIT (OUTPATIENT)
Dept: BARIATRICS/WEIGHT MGMT | Age: 27
End: 2022-10-13
Payer: MEDICAID

## 2022-10-13 VITALS
WEIGHT: 170.3 LBS | HEIGHT: 63 IN | HEART RATE: 82 BPM | BODY MASS INDEX: 30.18 KG/M2 | OXYGEN SATURATION: 99 % | DIASTOLIC BLOOD PRESSURE: 78 MMHG | SYSTOLIC BLOOD PRESSURE: 118 MMHG

## 2022-10-13 DIAGNOSIS — Z98.84 S/P LAPAROSCOPIC SLEEVE GASTRECTOMY: Primary | ICD-10-CM

## 2022-10-13 LAB
IRON: 121 UG/DL (ref 37–145)
PCT TRANSFERRIN: 53 % (ref 10–44)
TOTAL IRON BINDING CAPACITY: 230 UG/DL (ref 250–450)
UNSATURATED IRON BINDING CAPACITY: 109 UG/DL (ref 110–370)

## 2022-10-13 PROCEDURE — 99213 OFFICE O/P EST LOW 20 MIN: CPT | Performed by: SURGERY

## 2022-10-13 PROCEDURE — G8484 FLU IMMUNIZE NO ADMIN: HCPCS | Performed by: SURGERY

## 2022-10-13 PROCEDURE — G8427 DOCREV CUR MEDS BY ELIG CLIN: HCPCS | Performed by: SURGERY

## 2022-10-13 PROCEDURE — G8417 CALC BMI ABV UP PARAM F/U: HCPCS | Performed by: SURGERY

## 2022-10-13 PROCEDURE — 1036F TOBACCO NON-USER: CPT | Performed by: SURGERY

## 2022-10-13 NOTE — PROGRESS NOTES
BARIATRIC SURGERY OFFICE PROGRESS NOTE    SUBJECTIVE:    Patient presenting today referred from MIREYA Salinas CNP, for   Chief Complaint   Patient presents with    Follow-up     8 mo FU s/p  [facility]16[facility]22   . Vitals:    10/13/22 0902   BP: 118/78   Pulse: 82   SpO2: 99%        BMI: Body mass index is 30.17 kg/m². Weight History: Wt Readings from Last 3 Encounters:   10/13/22 170 lb 4.8 oz (77.2 kg)   10/11/22 172 lb (78 kg)   07/14/22 179 lb 11.2 oz (81.5 kg)       If within 30 days of bariatric surgery date, have you been to the ED: [id number] Mcgill is a 32 y.o. female presenting in  9 mo  bariatric POST-OP visit. Weight change:     -9.4 lbs since last visit.    -38.8 lbs since surgery.    -73.34 lbs since starting program.    Changes in health since last visit: None, doing well. Pre-op clearances completed: N/A. Pt tracking calories/protein: Yes. Pt exercising: Yes. Pt taking vitamins: Yes. Fluid intake: Appropriate.      Past Medical History:   Diagnosis Date    37 weeks gestation of pregnancy 06/28/2016    Anemia     COVID-19 01/01/2022    Positive test (see under media)    Diverticulosis     Dyspareunia in female     Fatigue     H/O echocardiogram 05/21/2021    EF 55-60% no evidence of pericardial effusion no significant valvular regurgitation    History of blood transfusion 2014    Received 5 units after childbirth    History of postpartum hemorrhage, currently pregnant in third trimester 06/28/2016    Hx of cardiovascular stress test 05/07/2021    Normal study    Irritable bowel syndrome with constipation     Menopausal symptoms     Migraines     Preeclampsia 2014    Sciatica         Patient Active Problem List   Diagnosis    Diarrhea    Non morbid obesity due to excess calories    Migraine without aura and without status migrainosus, not intractable    Depression    Muscle spasm of back    Anxiety    Latex allergy    S/P laparoscopic cholecystectomy    Morbid obesity with BMI of 40.0-44.9, adult ([address]    Esophageal reflux    Morbid obesity due to excess calories ([facility] [address]    S/P laparoscopic sleeve gastrectomy       Past Surgical History:   Procedure Laterality Date    CHOLECYSTECTOMY  12/13/2018    COLONOSCOPY      DENTAL SURGERY      wisdom teeth    DILATION AND CURETTAGE OF UTERUS  2018    HYSTERECTOMY (CERVIX STATUS UNKNOWN)  10/2018    SALPINGECTOMY Bilateral 2016    SLEEVE GASTRECTOMY N/A 02/16/2022    GASTRECTOMY SLEEVE LAPAROSCOPIC ROBOTIC performed by Perlita Crawford MD at [address] [place] [address] 04/12/2021    EGD BIOPSY performed by Frederic June MD at [facility] ENDOSCOPY       Current Outpatient Medications   Medication Sig Dispense Refill    calcium carbonate (OSCAL) 500 MG TABS tablet Take 500 mg by mouth daily      Multiple Vitamin (MULTIVITAMIN ADULT PO) Take by mouth      Probiotic Product (PROBIOTIC-10 PO) Take by mouth      FIBER ADULT GUMMIES PO Take by mouth      Docusate Sodium (STOOL SOFTENER LAXATIVE PO) Take by mouth      topiramate (TOPAMAX) 50 MG tablet Take 0.5 tablets by mouth daily 30 tablet 1    ondansetron (ZOFRAN-ODT) 4 MG disintegrating tablet Take 1 tablet by mouth 3 times daily as needed for Nausea or Vomiting 21 tablet 2    EPINEPHrine (EPIPEN 2-CLEOPATRA) 0.3 MG/0.3ML SOAJ injection Use as directed for allergic reaction 2 each 0    nystatin (MYCOSTATIN) 417030 UNIT/GM powder Apply 3 times daily.  1 Bottle 5    busPIRone (BUSPAR) 10 MG tablet Take 10 mg by mouth 3 times daily (Patient not taking: Reported on 10/13/2022)      pantoprazole (PROTONIX) 20 MG tablet Take 1 tablet by mouth every morning (before breakfast) (Patient not taking: No sig reported) 30 tablet 5    pantoprazole (PROTONIX) 40 MG tablet Take 1 tablet by mouth 2 times daily for 14 days 28 tablet 0    pantoprazole (PROTONIX) 20 MG tablet Take 1 tablet by mouth 2 times daily (Patient not taking: No sig reported) 60 tablet 3    topiramate (TOPAMAX) 100 MG tablet Take 50 mg by mouth 2 times daily  (Patient not taking: No sig reported)      sertraline (ZOLOFT) 50 MG tablet Take 1 tablet by mouth daily (Patient not taking: No sig reported) 30 tablet 5     No current facility-administered medications for this visit. Allergies   Allergen Reactions    Latex Hives and Rash    Macrobid [Nitrofurantoin Macrocrystal] Hives     No SOB or closing of throat noted when pt had reaction      Ropivacaine      \"felt like bugs were crawling on my skin\"    Norco [Hydrocodone-Acetaminophen]      Makes her really sick to her stomach; hard to wake up with,it keeps her asleep    Other Rash     Prolene suture    Tape [Adhesive Tape] Rash         Review of Systems   All other systems reviewed and are negative. OBJECTIVE:    /78 (Site: Right Upper Arm, Position: Sitting, Cuff Size: Medium Adult)   Pulse 82   Ht 5' 3\" (1.6 m)   Wt 170 lb 4.8 oz (77.2 kg)   LMP 10/18/2018 (Approximate)   SpO2 99%   BMI 30.17 kg/m²      Physical Exam  Vitals reviewed. Constitutional:       General: She is not in acute distress. HENT:      Head: Normocephalic and atraumatic. Right Ear: External ear normal.      Left Ear: External ear normal.      Nose: Nose normal.   Eyes:      General:         Right eye: No discharge. Left eye: No discharge. Extraocular Movements: Extraocular movements intact. Cardiovascular:      Rate and Rhythm: Normal rate. Pulmonary:      Effort: No respiratory distress. Abdominal:      Palpations: Abdomen is soft. Tenderness: There is no abdominal tenderness. Comments: Incisions healing appropriately     Musculoskeletal:         General: No swelling. Skin:     General: Skin is warm. Neurological:      General: No focal deficit present. Mental Status: She is alert. ASSESSMENT & PLAN:    1. S/P laparoscopic sleeve gastrectomy  -Down 38.8 lbs since surgery, ~73 lbs since starting program.  -Off PPI.  No reflux.   -Pt encouraged to continue exercising. Minimum goal of 150 minutes per week with ideal goal of 300 min per week. Exercise regimen should include at least 2-3 sessions per week of strength training. These recommendations are current with the most recent ASMBS guidelines. -Regular diet.   -800 kacey / day, 60-80 g protein /d.   -F/u at 1 year anniversary   -Reviewed labs  -Call with any questions, concerns, or issues whatsoever. As of current visit, regarding obesity-related co-morbid conditions:  DEB [] compliant [] no longer using [] resolved per sleep study; hypertension [] medications; hyperlipidemia [] medications; GERD [] medications; DM [] insulin [] non-insulin [] no medsGERD RESOLVED. OFF PPI. No orders of the defined types were placed in this encounter. No orders of the defined types were placed in this encounter. Follow Up:  No follow-ups on file.     Judy Chavira MD

## 2022-10-15 LAB
VITAMIN B1, PLASMA: 170 NMOL/L (ref 70–180)
ZINC: 107 UG/DL (ref 60–120)

## 2022-10-25 ENCOUNTER — OFFICE VISIT (OUTPATIENT)
Dept: OBGYN | Age: 27
End: 2022-10-25
Payer: MEDICAID

## 2022-10-25 VITALS — BODY MASS INDEX: 30.11 KG/M2 | DIASTOLIC BLOOD PRESSURE: 82 MMHG | SYSTOLIC BLOOD PRESSURE: 137 MMHG | WEIGHT: 170 LBS

## 2022-10-25 DIAGNOSIS — N95.1 MENOPAUSAL SYMPTOMS: Primary | ICD-10-CM

## 2022-10-25 DIAGNOSIS — N94.10 DYSPAREUNIA, FEMALE: ICD-10-CM

## 2022-10-25 PROCEDURE — G8484 FLU IMMUNIZE NO ADMIN: HCPCS | Performed by: OBSTETRICS & GYNECOLOGY

## 2022-10-25 PROCEDURE — 1036F TOBACCO NON-USER: CPT | Performed by: OBSTETRICS & GYNECOLOGY

## 2022-10-25 PROCEDURE — 99204 OFFICE O/P NEW MOD 45 MIN: CPT | Performed by: OBSTETRICS & GYNECOLOGY

## 2022-10-25 PROCEDURE — G8427 DOCREV CUR MEDS BY ELIG CLIN: HCPCS | Performed by: OBSTETRICS & GYNECOLOGY

## 2022-10-25 PROCEDURE — G8417 CALC BMI ABV UP PARAM F/U: HCPCS | Performed by: OBSTETRICS & GYNECOLOGY

## 2022-10-25 PROCEDURE — 36415 COLL VENOUS BLD VENIPUNCTURE: CPT | Performed by: OBSTETRICS & GYNECOLOGY

## 2022-10-25 RX ORDER — ESTRADIOL 0.1 MG/G
1 CREAM VAGINAL
Qty: 3 EACH | Refills: 3 | Status: SHIPPED | OUTPATIENT
Start: 2022-10-26

## 2022-10-25 RX ORDER — TOPIRAMATE 50 MG/1
TABLET, FILM COATED ORAL
Qty: 15 TABLET | Refills: 1 | OUTPATIENT
Start: 2022-10-25

## 2022-10-25 NOTE — PROGRESS NOTES
10/25/22    [facility]  1995    Chief Complaint   Patient presents with    Pelvic Pain     Pt here to discuss u/s results d/t dyspareunia. Pt c/o incontinence. Erna Calvillo is a 32 y.o. female who presents today for evaluation of dyspareunia.  + hot flahses. _+ night sweats. Dry vagina. Irritated from multiple lubricants.         Past Medical History:   Diagnosis Date    37 weeks gestation of pregnancy 06/28/2016    Anemia     COVID-19 01/01/2022    Positive test (see under media)    Diverticulosis     Dyspareunia in female     Fatigue     H/O echocardiogram 05/21/2021    EF 55-60% no evidence of pericardial effusion no significant valvular regurgitation    History of blood transfusion 2014    Received 5 units after childbirth    History of postpartum hemorrhage, currently pregnant in third trimester 06/28/2016    Hx of cardiovascular stress test 05/07/2021    Normal study    Irritable bowel syndrome with constipation     Menopausal symptoms     Migraines     Preeclampsia 2014    Sciatica        Past Surgical History:   Procedure Laterality Date    CHOLECYSTECTOMY  12/13/2018    COLONOSCOPY      DENTAL SURGERY      wisdom teeth    DILATION AND CURETTAGE OF UTERUS  2018    HYSTERECTOMY (CERVIX STATUS UNKNOWN)  10/2018    SALPINGECTOMY Bilateral 2016    SLEEVE GASTRECTOMY N/A 02/16/2022    GASTRECTOMY SLEEVE LAPAROSCOPIC ROBOTIC performed by Jason Parham MD at [address] [place] [address] N/A 04/12/2021    EGD BIOPSY performed by Callie Majano MD at [facility] ENDOSCOPY       Social History     Tobacco Use    Smoking status: Never    Smokeless tobacco: Never   Vaping Use    Vaping Use: Never used   Substance Use Topics    Alcohol use: Yes     Comment: rarely    Drug use: Not Currently     Frequency: 7.0 times per week     Comment: CBD GUMMIES       Family History   Problem Relation Age of Onset    Heart Disease Mother     Cancer Mother     Uterine Cancer Mother     Diabetes Father     High Blood Pressure Father     Coronary Art Dis Father     Heart Disease Brother     High Blood Pressure Brother     Learning Disabilities Brother     Diabetes Brother     Cancer Maternal Grandfather     Breast Cancer Maternal Great Grandmother        Current Outpatient Medications   Medication Sig Dispense Refill    [START ON 10/26/2022] estradiol (ESTRACE VAGINAL) 0.1 MG/GM vaginal cream Place 1 g vaginally three times a week Use 1 g vaginally nightly for 4 weeks, then 1 g nightly 2-3 times per week. 3 each 3    calcium carbonate (OSCAL) 500 MG TABS tablet Take 500 mg by mouth daily      Multiple Vitamin (MULTIVITAMIN ADULT PO) Take by mouth      Probiotic Product (PROBIOTIC-10 PO) Take by mouth      FIBER ADULT GUMMIES PO Take by mouth      Docusate Sodium (STOOL SOFTENER LAXATIVE PO) Take by mouth      topiramate (TOPAMAX) 50 MG tablet Take 0.5 tablets by mouth daily 30 tablet 1    busPIRone (BUSPAR) 10 MG tablet Take 10 mg by mouth 3 times daily (Patient not taking: Reported on 10/13/2022)      pantoprazole (PROTONIX) 20 MG tablet Take 1 tablet by mouth every morning (before breakfast) (Patient not taking: No sig reported) 30 tablet 5    pantoprazole (PROTONIX) 40 MG tablet Take 1 tablet by mouth 2 times daily for 14 days 28 tablet 0    pantoprazole (PROTONIX) 20 MG tablet Take 1 tablet by mouth 2 times daily (Patient not taking: No sig reported) 60 tablet 3    ondansetron (ZOFRAN-ODT) 4 MG disintegrating tablet Take 1 tablet by mouth 3 times daily as needed for Nausea or Vomiting 21 tablet 2    topiramate (TOPAMAX) 100 MG tablet Take 50 mg by mouth 2 times daily  (Patient not taking: No sig reported)      EPINEPHrine (EPIPEN 2-CLEOPATRA) 0.3 MG/0.3ML SOAJ injection Use as directed for allergic reaction 2 each 0    nystatin (MYCOSTATIN) [id number] UNIT/GM powder Apply 3 times daily.  1 Bottle 5    sertraline (ZOLOFT) 50 MG tablet Take 1 tablet by mouth daily (Patient not taking: No sig reported) 30 tablet 5 No current facility-administered medications for this visit. Allergies   Allergen Reactions    Latex Hives and Rash    Macrobid [Nitrofurantoin Macrocrystal] Hives     No SOB or closing of throat noted when pt had reaction      Ropivacaine      \"felt like bugs were crawling on my skin\"    Norco [Hydrocodone-Acetaminophen]      Makes her really sick to her stomach; hard to wake up with,it keeps her asleep    Other Rash     Prolene suture    Tape [Adhesive Tape] Rash           Immunization History   Administered Date(s) Administered    DTaP 1993, 1993, 01/10/1994, 1995, 1999    HPV Bivalent (Cervarix) 2010, 2011, 2012    Hepatitis A 2010, 2011    Hepatitis B 1993, 1993, 1993    Hepatitis B (Recombivax HB) 1993, 1993, 1993    Hib PRP-OMP (PedvaxHIB) 1993, 1993, 01/10/1994, 1995    Hib, unspecified 1993, 1993, 01/10/1994, 1995    Influenza Virus Vaccine 10/27/2015, 10/25/2018    Influenza, AFLURIA (age 1 yrs+), FLUZONE, (age 10 mo+), MDV, 0.5mL 10/12/2016    MMR 1995, 1999    Meningococcal ACWY Vaccine 2010    PPD Test 05/15/2013, 2013    Polio IPV (IPOL) 1993, 1993, 1995, 1999    Tdap (Boostrix, Adacel) 2010, 2013, 2014, 2016    Varicella (Varivax) 1997, 2010       Review of Systems    /82   Wt 170 lb (77.1 kg)   LMP 10/18/2018 (Approximate)   BMI 30.11 kg/m²     Physical Exam  TSH, High Sensitivity 0.270 - 4.20 uIu/ml 0.917  1.010 CM  0.985    See us report 10/2022  No results found for this visit on 10/25/22. ASSESSMENT AND PLAN   Diagnosis Orders   1. Menopausal symptoms  Estrogens, Fractionated    Follicle Stimulating Hormone      2. Dyspareunia, female  estradiol (ESTRACE VAGINAL) 0.1 MG/GM vaginal cream        Try silicoan, water based lubricants as well.     Return in about 6 weeks (around 12/6/2022).     Rama Roberts MD

## 2022-10-26 LAB — FOLLICLE STIMULATING HORMONE: 3.2 MIU/ML

## 2022-10-28 LAB
ESTRADIOL LEVEL: 339.1 PG/ML
ESTROGEN TOTAL: 461.6 PG/ML
ESTRONE: 122.5 PG/ML

## 2022-12-13 ENCOUNTER — TELEPHONE (OUTPATIENT)
Dept: OBGYN | Age: 27
End: 2022-12-13

## 2022-12-13 ENCOUNTER — OFFICE VISIT (OUTPATIENT)
Dept: OBGYN | Age: 27
End: 2022-12-13
Payer: MEDICAID

## 2022-12-13 DIAGNOSIS — N94.10 DYSPAREUNIA, FEMALE: Primary | ICD-10-CM

## 2022-12-13 PROCEDURE — 99213 OFFICE O/P EST LOW 20 MIN: CPT | Performed by: OBSTETRICS & GYNECOLOGY

## 2022-12-13 PROCEDURE — G8417 CALC BMI ABV UP PARAM F/U: HCPCS | Performed by: OBSTETRICS & GYNECOLOGY

## 2022-12-13 PROCEDURE — G8427 DOCREV CUR MEDS BY ELIG CLIN: HCPCS | Performed by: OBSTETRICS & GYNECOLOGY

## 2022-12-13 PROCEDURE — G8484 FLU IMMUNIZE NO ADMIN: HCPCS | Performed by: OBSTETRICS & GYNECOLOGY

## 2022-12-13 PROCEDURE — 1036F TOBACCO NON-USER: CPT | Performed by: OBSTETRICS & GYNECOLOGY

## 2022-12-13 RX ORDER — ESTRADIOL 10 UG/1
10 INSERT VAGINAL DAILY
Qty: 8 TABLET | Refills: 11 | Status: SHIPPED | OUTPATIENT
Start: 2022-12-13

## 2022-12-13 NOTE — TELEPHONE ENCOUNTER
Pharmacy called wanting to confirm the Vagifem order. They wanted to verify if it is supposed to be twice weekly or daily. Please advise.

## 2022-12-13 NOTE — PROGRESS NOTES
12/13/22    Sydnie Gallegos  1995    Chief Complaint   Patient presents with    Follow-up     Pt here to go over blood work? Pt states the Estradiol worked while she was on it, but is now back to normal now that her prescription is out. Pt states she did not like the way it made her smell.          Sydnie Gallegos is a 32 y.o. female who presents today for evaluation of dyspareuia, resolved with estrace cream but to patient it gave her odor      Past Medical History:   Diagnosis Date    37 weeks gestation of pregnancy 06/28/2016    Anemia     COVID-19 01/01/2022    Positive test (see under media)    Diverticulosis     Dyspareunia in female     Fatigue     H/O echocardiogram 05/21/2021    EF 55-60% no evidence of pericardial effusion no significant valvular regurgitation    History of blood transfusion 2014    Received 5 units after childbirth    History of postpartum hemorrhage, currently pregnant in third trimester 06/28/2016    Hx of cardiovascular stress test 05/07/2021    Normal study    Irritable bowel syndrome with constipation     Menopausal symptoms     Migraines     Preeclampsia 2014    Sciatica        Past Surgical History:   Procedure Laterality Date    CHOLECYSTECTOMY  12/13/2018    COLONOSCOPY      DENTAL SURGERY      wisdom teeth    DILATION AND CURETTAGE OF UTERUS  2018    HYSTERECTOMY (CERVIX STATUS UNKNOWN)  10/2018    SALPINGECTOMY Bilateral 2016    SLEEVE GASTRECTOMY N/A 02/16/2022    GASTRECTOMY SLEEVE LAPAROSCOPIC ROBOTIC performed by Emma Solorzano MD at [facility] N/A 04/12/2021    EGD BIOPSY performed by Jocelyne Garcia MD at [address] [place] [address] History     Tobacco Use    Smoking status: Never    Smokeless tobacco: Never   Vaping Use    Vaping Use: Never used   Substance Use Topics    Alcohol use: Yes     Comment: rarely    Drug use: Not Currently     Frequency: 7.0 times per week     Comment: CBD GUMMIES       Family History   Problem Relation Age of Onset    Heart Disease Mother     Cancer Mother     Uterine Cancer Mother     Diabetes Father     High Blood Pressure Father     Coronary Art Dis Father     Heart Disease Brother     High Blood Pressure Brother     Learning Disabilities Brother     Diabetes Brother     Cancer Maternal Grandfather     Breast Cancer Maternal Great Grandmother        Current Outpatient Medications   Medication Sig Dispense Refill    Estradiol (VAGIFEM) 10 MCG TABS vaginal tablet Place 1 tablet vaginally daily 8 tablet 11    calcium carbonate (OSCAL) 500 MG TABS tablet Take 500 mg by mouth daily      Multiple Vitamin (MULTIVITAMIN ADULT PO) Take by mouth      Probiotic Product (PROBIOTIC-10 PO) Take by mouth      FIBER ADULT GUMMIES PO Take by mouth      Docusate Sodium (STOOL SOFTENER LAXATIVE PO) Take by mouth      topiramate (TOPAMAX) 50 MG tablet Take 0.5 tablets by mouth daily 30 tablet 1    busPIRone (BUSPAR) 10 MG tablet Take 10 mg by mouth 3 times daily (Patient not taking: Reported on 10/13/2022)      pantoprazole (PROTONIX) 20 MG tablet Take 1 tablet by mouth every morning (before breakfast) (Patient not taking: No sig reported) 30 tablet 5    pantoprazole (PROTONIX) 40 MG tablet Take 1 tablet by mouth 2 times daily for 14 days 28 tablet 0    pantoprazole (PROTONIX) 20 MG tablet Take 1 tablet by mouth 2 times daily (Patient not taking: No sig reported) 60 tablet 3    ondansetron (ZOFRAN-ODT) 4 MG disintegrating tablet Take 1 tablet by mouth 3 times daily as needed for Nausea or Vomiting 21 tablet 2    topiramate (TOPAMAX) 100 MG tablet Take 50 mg by mouth 2 times daily  (Patient not taking: No sig reported)      EPINEPHrine (EPIPEN 2-CLEOPATRA) 0.3 MG/0.3ML SOAJ injection Use as directed for allergic reaction 2 each 0    nystatin (MYCOSTATIN) 901924 UNIT/GM powder Apply 3 times daily.  1 Bottle 5    sertraline (ZOLOFT) 50 MG tablet Take 1 tablet by mouth daily (Patient not taking: No sig reported) 30 tablet 5     No current facility-administered medications for this visit.        Allergies   Allergen Reactions    Latex Hives and Rash    Macrobid [Nitrofurantoin Macrocrystal] Hives     No SOB or closing of throat noted when pt had reaction      Ropivacaine      \"felt like bugs were crawling on my skin\"    Norco [Hydrocodone-Acetaminophen]      Makes her really sick to her stomach; hard to wake up with,it keeps her asleep    Other Rash     Prolene suture    Tape [Adhesive Tape] Rash           Immunization History   Administered Date(s) Administered    DTaP 1993, 1993, 01/10/1994, 1995, 1999    HPV Bivalent (Cervarix) 2010, 2011, 2012    Hepatitis A 2010, 2011    Hepatitis B 1993, 1993, 1993    Hepatitis B (Recombivax HB) 1993, 1993, 1993    Hib PRP-OMP (PedvaxHIB) 1993, 1993, 01/10/1994, 1995    Hib, unspecified 1993, 1993, 01/10/1994, 1995    Influenza Virus Vaccine 10/27/2015, 10/25/2018    Influenza, AFLURIA (age 1 yrs+), FLUZONE, (age 10 mo+), MDV, 0.5mL 10/12/2016    MMR 1995, 1999    Meningococcal ACWY Vaccine 2010    PPD Test 05/15/2013, 2013    Polio IPV (IPOL) 1993, 1993, 1995, 1999    Tdap (Boostrix, Adacel) 2010, 2013, 2014, 2016    Varicella (Varivax) 1997, 2010       Review of Systems    LMP 10/18/2018 (Approximate)     Physical Exam  10/25/2022 1029  0144 Follicle Stimulating Hormone [[id number]]   Blood    Component Value Units   FSH 3.2  mIU/mL          10/25/2022 1029 10/28/2022 1555 Estrogens, Fractionated [[id number]]   Blood    Component Value Units   Estradiol 339.1  pg/mL   Estrone 122.5  pg/mL   Estrogen Total 461.6  pg/mL          10/11/2022 1226 10/11/2022 1622 TSH [[id number]] Component Value Units   TSH, High Sensitivity 0.917  uIu/ml            No results found for this visit on 12/13/22. ASSESSMENT AND PLAN   Diagnosis Orders   1. Dyspareunia, female  Estradiol (VAGIFEM) 10 MCG TABS vaginal tablet        Dc estrae vaginal cream    Return if symptoms worsen or fail to improve.     Klaudia Remy MD

## 2023-01-04 NOTE — PROGRESS NOTES
may have no symptoms, mild symptoms, such as fever, cough, and shortness of breath or they may have more severe illness, developing severe and fatal pneumonia. As a result, Advance Care Planning with attention to naming a health care decision maker (someone you trust to make healthcare decisions for you if you could not speak for yourself) and sharing other health care preferences is important BEFORE a possible health crisis. Please contact your Primary Care Provider to discuss Advance Care Planning. Preventing the Spread of Coronavirus Disease 2019 in Homes and Residential Communities  For the most recent information go to [email or web address]    Prevention steps for People with confirmed or suspected COVID-19 (including persons under investigation) who do not need to be hospitalized  and   People with confirmed COVID-19 who were hospitalized and determined to be medically stable to go home    Your healthcare provider and public health staff will evaluate whether you can be cared for at home. If it is determined that you do not need to be hospitalized and can be isolated at home, you will be monitored by staff from your local or state health department. You should follow the prevention steps below until a healthcare provider or local or state health department says you can return to your normal activities. Stay home except to get medical care  People who are mildly ill with COVID-19 are able to isolate at home during their illness. You should restrict activities outside your home, except for getting medical care. Do not go to work, school, or public areas. Avoid using public transportation, ride-sharing, or taxis. Separate yourself from other people and animals in your home  People: As much as possible, you should stay in a specific room and away from other people in your home. Also, you should use a separate bathroom, if available. Animals:  You should
Your hemoglobin is 7.8 and your platelets (helps you form clots) is low. It is important that you take your iron every day. please follow up with your hematologist within 1 week.     Anemia    Anemia is a condition in which the concentration of red blood cells or hemoglobin in the blood is below normal. Hemoglobin is a substance in red blood cells that carries oxygen to the tissues of the body. Anemia results in not enough oxygen reaching these tissues which can cause symptoms such as weakness, dizziness/lightheadedness, shortness of breath, chest pain, paleness, or nausea. The cause of your anemia may or may not be determined immediately. If your hemoglobin was dangerously low, you may have received a blood transfusion. Usually reactions to transfusions occur immediately but monitor yourself for any fevers, rash, or shortness of breath.    SEEK IMMEDIATE MEDICAL CARE IF YOU HAVE ANY OF THE FOLLOWING SYMPTOMS: extreme weakness/chest pain/shortness of breath, black or bloody stools, vomiting blood, fainting, fever, or any signs of dehydration.    Headache    A headache is pain or discomfort felt around the head or neck area. The specific cause of a headache may not be found as there are many types including tension headaches, migraine headaches, and cluster headaches. Watch your condition for any changes. Things you can do to manage your pain include taking over the counter and prescription medications as instructed by your health care provider, lying down in a dark quiet room, limiting stress, getting regular sleep, and refraining from alcohol and tobacco products.    SEEK IMMEDIATE MEDICAL CARE IF YOU HAVE ANY OF THE FOLLOWING SYMPTOMS: fever, vomiting, stiff neck, loss of vision, problems with speech, muscle weakness, loss of balance, trouble walking, passing out, or confusion.    Chest Pain    Chest pain can be caused by many different conditions which may or may not be dangerous. Causes include heartburn, lung infections, heart attack, blood clot in lungs, skin infections, strain or damage to muscle, cartilage, or bones, etc. In addition to a history and physical examination, an electrocardiogram (ECG) or other lab tests may have been performed to determine the cause of your chest pain. Follow up with your primary care provider or with a cardiologist as instructed.     SEEK IMMEDIATE MEDICAL CARE IF YOU HAVE ANY OF THE FOLLOWING SYMPTOMS: worsening chest pain, coughing up blood, unexplained back/neck/jaw pain, severe abdominal pain, dizziness or lightheadedness, fainting, shortness of breath, sweaty or clammy skin, vomiting, or racing heart beat. These symptoms may represent a serious problem that is an emergency. Do not wait to see if the symptoms will go away. Get medical help right away. Call 911 and do not drive yourself to the hospital.

## 2023-01-08 DIAGNOSIS — N94.10 DYSPAREUNIA, FEMALE: ICD-10-CM

## 2023-01-09 RX ORDER — TOPIRAMATE 50 MG/1
25 TABLET, FILM COATED ORAL DAILY
Qty: 30 TABLET | Refills: 1 | OUTPATIENT
Start: 2023-01-09

## 2023-01-09 RX ORDER — ESTRADIOL 10 UG/1
10 INSERT VAGINAL DAILY
Qty: 8 TABLET | Refills: 11 | OUTPATIENT
Start: 2023-01-09

## 2023-01-11 DIAGNOSIS — N94.10 DYSPAREUNIA, FEMALE: ICD-10-CM

## 2023-01-12 RX ORDER — TOPIRAMATE 50 MG/1
25 TABLET, FILM COATED ORAL DAILY
Qty: 30 TABLET | Refills: 1 | OUTPATIENT
Start: 2023-01-12

## 2023-01-12 RX ORDER — ESTRADIOL 10 UG/1
10 INSERT VAGINAL DAILY
Qty: 8 TABLET | Refills: 11 | Status: SHIPPED | OUTPATIENT
Start: 2023-01-12

## 2023-05-08 ENCOUNTER — OFFICE VISIT (OUTPATIENT)
Dept: BARIATRICS/WEIGHT MGMT | Age: 28
End: 2023-05-08

## 2023-05-08 ENCOUNTER — HOSPITAL ENCOUNTER (OUTPATIENT)
Age: 28
Discharge: HOME | End: 2023-05-08
Payer: MEDICAID

## 2023-05-08 VITALS
HEART RATE: 75 BPM | OXYGEN SATURATION: 99 % | BODY MASS INDEX: 29.79 KG/M2 | WEIGHT: 168.1 LBS | HEIGHT: 63 IN | DIASTOLIC BLOOD PRESSURE: 72 MMHG | SYSTOLIC BLOOD PRESSURE: 124 MMHG

## 2023-05-08 DIAGNOSIS — R10.13 EPIGASTRIC PAIN: Primary | ICD-10-CM

## 2023-05-08 DIAGNOSIS — Z90.3 HISTORY OF SLEEVE GASTRECTOMY: ICD-10-CM

## 2023-05-08 DIAGNOSIS — R10.13 EPIGASTRIC PAIN: ICD-10-CM

## 2023-05-08 LAB
25(OH)D3 SERPL-MCNC: 34.47 NG/ML
ALBUMIN SERPL-MCNC: 4.6 GM/DL (ref 3.4–5)
ALP BLD-CCNC: 50 IU/L (ref 40–129)
ALT SERPL-CCNC: 9 U/L (ref 10–40)
ANION GAP SERPL CALCULATED.3IONS-SCNC: 9 MMOL/L (ref 4–16)
AST SERPL-CCNC: 14 IU/L (ref 15–37)
BASOPHILS ABSOLUTE: 0 K/CU MM
BASOPHILS RELATIVE PERCENT: 0.6 % (ref 0–1)
BILIRUB SERPL-MCNC: 0.3 MG/DL (ref 0–1)
BUN SERPL-MCNC: 15 MG/DL (ref 6–23)
CALCIUM SERPL-MCNC: 10.3 MG/DL (ref 8.3–10.6)
CHLORIDE BLD-SCNC: 102 MMOL/L (ref 99–110)
CHOLEST SERPL-MCNC: 172 MG/DL
CO2: 27 MMOL/L (ref 21–32)
CREAT SERPL-MCNC: 0.6 MG/DL (ref 0.6–1.1)
DIFFERENTIAL TYPE: ABNORMAL
EOSINOPHILS ABSOLUTE: 0.1 K/CU MM
EOSINOPHILS RELATIVE PERCENT: 1.3 % (ref 0–3)
ESTIMATED AVERAGE GLUCOSE: 82 MG/DL
GFR SERPL CREATININE-BSD FRML MDRD: >60 ML/MIN/1.73M2
GLUCOSE SERPL-MCNC: 76 MG/DL (ref 70–99)
HBA1C MFR BLD: 4.5 % (ref 4.2–6.3)
HCT VFR BLD CALC: 40.8 % (ref 37–47)
HDLC SERPL-MCNC: 71 MG/DL
HEMOGLOBIN: 13.4 GM/DL (ref 12.5–16)
IMMATURE NEUTROPHIL %: 0.1 % (ref 0–0.43)
IRON: 76 UG/DL (ref 37–145)
LDLC SERPL CALC-MCNC: 89 MG/DL
LYMPHOCYTES ABSOLUTE: 2.3 K/CU MM
LYMPHOCYTES RELATIVE PERCENT: 33.1 % (ref 24–44)
MAGNESIUM: 2 MG/DL (ref 1.8–2.4)
MCH RBC QN AUTO: 28.6 PG (ref 27–31)
MCHC RBC AUTO-ENTMCNC: 32.8 % (ref 32–36)
MCV RBC AUTO: 87 FL (ref 78–100)
MONOCYTES ABSOLUTE: 0.5 K/CU MM
MONOCYTES RELATIVE PERCENT: 6.8 % (ref 0–4)
NUCLEATED RBC %: 0 %
PCT TRANSFERRIN: 33 % (ref 10–44)
PDW BLD-RTO: 12.4 % (ref 11.7–14.9)
PLATELET # BLD: 298 K/CU MM (ref 140–440)
PMV BLD AUTO: 11.9 FL (ref 7.5–11.1)
POTASSIUM SERPL-SCNC: 4.5 MMOL/L (ref 3.5–5.1)
RBC # BLD: 4.69 M/CU MM (ref 4.2–5.4)
SEGMENTED NEUTROPHILS ABSOLUTE COUNT: 4 K/CU MM
SEGMENTED NEUTROPHILS RELATIVE PERCENT: 58.1 % (ref 36–66)
SODIUM BLD-SCNC: 138 MMOL/L (ref 135–145)
TOTAL IMMATURE NEUTOROPHIL: 0.01 K/CU MM
TOTAL IRON BINDING CAPACITY: 231 UG/DL (ref 250–450)
TOTAL NUCLEATED RBC: 0 K/CU MM
TOTAL PROTEIN: 6.9 GM/DL (ref 6.4–8.2)
TRIGL SERPL-MCNC: 60 MG/DL
TSH SERPL DL<=0.005 MIU/L-ACNC: 0.59 UIU/ML (ref 0.27–4.2)
UNSATURATED IRON BINDING CAPACITY: 155 UG/DL (ref 110–370)
WBC # BLD: 6.9 K/CU MM (ref 4–10.5)

## 2023-05-08 PROCEDURE — 84630 ASSAY OF ZINC: CPT

## 2023-05-08 PROCEDURE — 80061 LIPID PANEL: CPT

## 2023-05-08 PROCEDURE — 83036 HEMOGLOBIN GLYCOSYLATED A1C: CPT

## 2023-05-08 PROCEDURE — 82607 VITAMIN B-12: CPT

## 2023-05-08 PROCEDURE — 83550 IRON BINDING TEST: CPT

## 2023-05-08 PROCEDURE — 82306 VITAMIN D 25 HYDROXY: CPT

## 2023-05-08 PROCEDURE — 83013 H PYLORI (C-13) BREATH: CPT

## 2023-05-08 PROCEDURE — 83540 ASSAY OF IRON: CPT

## 2023-05-08 PROCEDURE — 36415 COLL VENOUS BLD VENIPUNCTURE: CPT

## 2023-05-08 PROCEDURE — 83735 ASSAY OF MAGNESIUM: CPT

## 2023-05-08 PROCEDURE — 84443 ASSAY THYROID STIM HORMONE: CPT

## 2023-05-08 PROCEDURE — 80053 COMPREHEN METABOLIC PANEL: CPT

## 2023-05-08 PROCEDURE — 84425 ASSAY OF VITAMIN B-1: CPT

## 2023-05-08 PROCEDURE — 82746 ASSAY OF FOLIC ACID SERUM: CPT

## 2023-05-08 PROCEDURE — 85025 COMPLETE CBC W/AUTO DIFF WBC: CPT

## 2023-05-08 RX ORDER — PANTOPRAZOLE SODIUM 20 MG/1
20 TABLET, DELAYED RELEASE ORAL
Qty: 30 TABLET | Refills: 5 | Status: SHIPPED | OUTPATIENT
Start: 2023-05-08

## 2023-05-08 RX ORDER — SUCRALFATE 1 G/1
1 TABLET ORAL 4 TIMES DAILY
Qty: 120 TABLET | Refills: 3 | Status: SHIPPED | OUTPATIENT
Start: 2023-05-08

## 2023-05-08 ASSESSMENT — PATIENT HEALTH QUESTIONNAIRE - PHQ9
2. FEELING DOWN, DEPRESSED OR HOPELESS: 0
SUM OF ALL RESPONSES TO PHQ QUESTIONS 1-9: 1
SUM OF ALL RESPONSES TO PHQ9 QUESTIONS 1 & 2: 1
SUM OF ALL RESPONSES TO PHQ QUESTIONS 1-9: 1
1. LITTLE INTEREST OR PLEASURE IN DOING THINGS: 1

## 2023-05-08 ASSESSMENT — ENCOUNTER SYMPTOMS: NAUSEA: 1

## 2023-05-08 NOTE — PROGRESS NOTES
ordered  - RTC in one month       As of current visit, regarding obesity-related co-morbid conditions:  DEB [] compliant [] no longer using [] resolved per sleep study; hypertension [] medications; hyperlipidemia [] medications; GERD [] medications; DM [] insulin [] non-insulin [] no meds      The patient expressed understanding and willingness to comply nicely; all questions and concerns addressed. Orders Placed This Encounter   Medications    pantoprazole (PROTONIX) 20 MG tablet     Sig: Take 1 tablet by mouth every morning (before breakfast)     Dispense:  30 tablet     Refill:  5    sucralfate (CARAFATE) 1 GM tablet     Sig: Take 1 tablet by mouth 4 times daily     Dispense:  120 tablet     Refill:  3     Orders Placed This Encounter   Procedures    CBC with Auto Differential     Standing Status:   Future     Standing Expiration Date:   5/8/2024    Comprehensive Metabolic Panel     Standing Status:   Future     Standing Expiration Date:   5/8/2024    Hemoglobin A1C     Standing Status:   Future     Standing Expiration Date:   5/8/2024    Iron and TIBC     Standing Status:   Future     Standing Expiration Date:   5/8/2024    Lipid Panel     Standing Status:   Future     Standing Expiration Date:   5/8/2024    Magnesium     Standing Status:   Future     Standing Expiration Date:   5/8/2024    Zinc     Standing Status:   Future     Standing Expiration Date:   5/8/2024    Vitamin D 25 Hydroxy     Standing Status:   Future     Standing Expiration Date:   5/8/2024    Vitamin B12 & Folate     Standing Status:   Future     Standing Expiration Date:   5/8/2024    Vitamin B1     Standing Status:   Future     Standing Expiration Date:   5/8/2024    TSH with Reflex     Standing Status:   Future     Standing Expiration Date:   5/8/2024    H. Pylori Breath Test, Adult     Standing Status:   Future     Standing Expiration Date:   5/8/2024       Follow Up:  Return in about 1 month (around 6/8/2023).     MIREYA Dougherty Mai

## 2023-05-09 LAB
H PYLORI BREATH TEST: NEGATIVE
ZINC SERPL-MCNC: 93.6 UG/DL (ref 60–120)

## 2023-05-10 LAB
FOLATE SERPL-MCNC: >20 NG/ML (ref 3.1–17.5)
VITAMIN B-12: 458.7 PG/ML (ref 211–911)

## 2023-05-11 ENCOUNTER — TELEPHONE (OUTPATIENT)
Dept: BARIATRICS/WEIGHT MGMT | Age: 28
End: 2023-05-11

## 2023-05-11 NOTE — TELEPHONE ENCOUNTER
Per Aditi- called patient and told her that the h- pylori came back negative and to continue Protonix and Carafate. Alma Delia Morris voiced understood.

## 2023-05-12 LAB — VIT B1 PYROPHOSHATE BLD-SCNC: 164 NMOL/L (ref 70–180)

## 2023-06-19 ENCOUNTER — OFFICE VISIT (OUTPATIENT)
Dept: BARIATRICS/WEIGHT MGMT | Age: 28
End: 2023-06-19
Payer: MEDICAID

## 2023-06-19 VITALS
DIASTOLIC BLOOD PRESSURE: 70 MMHG | WEIGHT: 168.4 LBS | HEART RATE: 80 BPM | HEIGHT: 63 IN | SYSTOLIC BLOOD PRESSURE: 118 MMHG | BODY MASS INDEX: 29.84 KG/M2 | OXYGEN SATURATION: 100 %

## 2023-06-19 DIAGNOSIS — Z90.3 HISTORY OF SLEEVE GASTRECTOMY: Primary | ICD-10-CM

## 2023-06-19 DIAGNOSIS — R10.13 EPIGASTRIC PAIN: ICD-10-CM

## 2023-06-19 PROCEDURE — 1036F TOBACCO NON-USER: CPT | Performed by: NURSE PRACTITIONER

## 2023-06-19 PROCEDURE — G8427 DOCREV CUR MEDS BY ELIG CLIN: HCPCS | Performed by: NURSE PRACTITIONER

## 2023-06-19 PROCEDURE — 99213 OFFICE O/P EST LOW 20 MIN: CPT | Performed by: NURSE PRACTITIONER

## 2023-06-19 PROCEDURE — G8417 CALC BMI ABV UP PARAM F/U: HCPCS | Performed by: NURSE PRACTITIONER

## 2023-06-19 NOTE — PROGRESS NOTES
BARIATRIC SURGERY OFFICE PROGRESS NOTE    SUBJECTIVE:    Patient presenting today referred from Pcp No, for   Chief Complaint   Patient presents with    Weight Management     S/p s/g 2/16/22    . Vitals:    06/19/23 0902   BP: 118/70   Pulse: 80   SpO2: 100%        BMI: Body mass index is 29.6 kg/m². Weight History: Wt Readings from Last 3 Encounters:   06/19/23 168 lb 6.4 oz (76.4 kg)   05/08/23 168 lb 1.6 oz (76.2 kg)   10/25/22 170 lb (77.1 kg)         Xiomy Thurston is a 25 y.o. female presenting in  bariatric 16 months POST-OP visit.     Total weight loss/gain:   0 lbs since last visit  -40.7 lbs since surgery  -83.1 lbs since starting program    Changes in health since last visit: epigastric pain    Pt tracking calories: tracking calories about 800-1000; protein good    Pt exercising: stays active/ walks over 10,000 steps daily    Pt taking vitamins: daily    Fluid intake: good    Past Medical History:   Diagnosis Date    37 weeks gestation of pregnancy 06/28/2016    Anemia     COVID-19 01/01/2022    Positive test (see under media)    Diverticulosis     Dyspareunia in female     Fatigue     H/O echocardiogram 05/21/2021    EF 55-60% no evidence of pericardial effusion no significant valvular regurgitation    History of blood transfusion 2014    Received 5 units after childbirth    History of postpartum hemorrhage, currently pregnant in third trimester 06/28/2016    Hx of cardiovascular stress test 05/07/2021    Normal study    Irritable bowel syndrome with constipation     Menopausal symptoms     Migraines     Preeclampsia 2014    Sciatica         Patient Active Problem List   Diagnosis    Diarrhea    Non morbid obesity due to excess calories    Migraine without aura and without status migrainosus, not intractable    Depression    Muscle spasm of back    Anxiety    Latex allergy    S/P laparoscopic cholecystectomy    Morbid obesity with BMI of 40.0-44.9, adult (HCC)    Esophageal reflux    Morbid

## 2023-08-28 ENCOUNTER — HOSPITAL ENCOUNTER (OUTPATIENT)
Dept: MRI IMAGING | Age: 28
Discharge: HOME | End: 2023-08-28
Attending: ORTHOPAEDIC SURGERY
Payer: MEDICAID

## 2023-08-28 DIAGNOSIS — M54.16 LUMBAR RADICULOPATHY: ICD-10-CM

## 2023-08-28 PROCEDURE — 72148 MRI LUMBAR SPINE W/O DYE: CPT

## 2024-01-08 DIAGNOSIS — N94.10 DYSPAREUNIA, FEMALE: ICD-10-CM

## 2024-01-08 RX ORDER — ESTRADIOL 10 UG/1
INSERT VAGINAL
Qty: 8 TABLET | Refills: 0 | Status: SHIPPED | OUTPATIENT
Start: 2024-01-08

## 2024-01-15 ENCOUNTER — HOSPITAL ENCOUNTER (OUTPATIENT)
Age: 29
Discharge: HOME | End: 2024-01-15
Payer: MEDICAID

## 2024-01-15 DIAGNOSIS — Z90.3 HISTORY OF SLEEVE GASTRECTOMY: ICD-10-CM

## 2024-01-15 LAB
25(OH)D3 SERPL-MCNC: 30.68 NG/ML
ALBUMIN SERPL-MCNC: 4.4 GM/DL (ref 3.4–5)
ALP BLD-CCNC: 50 IU/L (ref 40–129)
ALT SERPL-CCNC: 13 U/L (ref 10–40)
ANION GAP SERPL CALCULATED.3IONS-SCNC: 9 MMOL/L (ref 7–16)
AST SERPL-CCNC: 15 IU/L (ref 15–37)
BASOPHILS ABSOLUTE: 0 K/CU MM
BASOPHILS RELATIVE PERCENT: 0.7 % (ref 0–1)
BILIRUB SERPL-MCNC: 0.4 MG/DL (ref 0–1)
BUN SERPL-MCNC: 10 MG/DL (ref 6–23)
CALCIUM SERPL-MCNC: 9.2 MG/DL (ref 8.3–10.6)
CHLORIDE BLD-SCNC: 108 MMOL/L (ref 99–110)
CHOLEST SERPL-MCNC: 137 MG/DL
CO2: 26 MMOL/L (ref 21–32)
CREAT SERPL-MCNC: 0.6 MG/DL (ref 0.6–1.1)
DIFFERENTIAL TYPE: ABNORMAL
EOSINOPHILS ABSOLUTE: 0.1 K/CU MM
EOSINOPHILS RELATIVE PERCENT: 2.2 % (ref 0–3)
ESTIMATED AVERAGE GLUCOSE: 82 MG/DL
FOLATE SERPL-MCNC: 15.7 NG/ML (ref 3.1–17.5)
GFR SERPL CREATININE-BSD FRML MDRD: >60 ML/MIN/1.73M2
GLUCOSE SERPL-MCNC: 80 MG/DL (ref 70–99)
HBA1C MFR BLD: 4.5 % (ref 4.2–6.3)
HCT VFR BLD CALC: 39.9 % (ref 37–47)
HDLC SERPL-MCNC: 62 MG/DL
HEMOGLOBIN: 13.2 GM/DL (ref 12.5–16)
IMMATURE NEUTROPHIL %: 0.3 % (ref 0–0.43)
IRON: 104 UG/DL (ref 37–145)
LDLC SERPL CALC-MCNC: 68 MG/DL
LYMPHOCYTES ABSOLUTE: 2 K/CU MM
LYMPHOCYTES RELATIVE PERCENT: 33.4 % (ref 24–44)
MAGNESIUM: 2.1 MG/DL (ref 1.8–2.4)
MCH RBC QN AUTO: 29 PG (ref 27–31)
MCHC RBC AUTO-ENTMCNC: 33.1 % (ref 32–36)
MCV RBC AUTO: 87.7 FL (ref 78–100)
MONOCYTES ABSOLUTE: 0.4 K/CU MM
MONOCYTES RELATIVE PERCENT: 6.3 % (ref 0–4)
NUCLEATED RBC %: 0 %
PCT TRANSFERRIN: 49 % (ref 10–44)
PDW BLD-RTO: 12.5 % (ref 11.7–14.9)
PLATELET # BLD: 309 K/CU MM (ref 140–440)
PMV BLD AUTO: 11.7 FL (ref 7.5–11.1)
POTASSIUM SERPL-SCNC: 4.4 MMOL/L (ref 3.5–5.1)
RBC # BLD: 4.55 M/CU MM (ref 4.2–5.4)
SEGMENTED NEUTROPHILS ABSOLUTE COUNT: 3.4 K/CU MM
SEGMENTED NEUTROPHILS RELATIVE PERCENT: 57.1 % (ref 36–66)
SODIUM BLD-SCNC: 143 MMOL/L (ref 135–145)
TOTAL IMMATURE NEUTOROPHIL: 0.02 K/CU MM
TOTAL IRON BINDING CAPACITY: 214 UG/DL (ref 250–450)
TOTAL NUCLEATED RBC: 0 K/CU MM
TOTAL PROTEIN: 6.7 GM/DL (ref 6.4–8.2)
TRIGL SERPL-MCNC: 33 MG/DL
UNSATURATED IRON BINDING CAPACITY: 110 UG/DL (ref 110–370)
VITAMIN B-12: 652.5 PG/ML (ref 211–911)
WBC # BLD: 6 K/CU MM (ref 4–10.5)

## 2024-01-15 PROCEDURE — 83540 ASSAY OF IRON: CPT

## 2024-01-15 PROCEDURE — 80053 COMPREHEN METABOLIC PANEL: CPT

## 2024-01-15 PROCEDURE — 82607 VITAMIN B-12: CPT

## 2024-01-15 PROCEDURE — 84630 ASSAY OF ZINC: CPT

## 2024-01-15 PROCEDURE — 82746 ASSAY OF FOLIC ACID SERUM: CPT

## 2024-01-15 PROCEDURE — 85025 COMPLETE CBC W/AUTO DIFF WBC: CPT

## 2024-01-15 PROCEDURE — 80061 LIPID PANEL: CPT

## 2024-01-15 PROCEDURE — 82306 VITAMIN D 25 HYDROXY: CPT

## 2024-01-15 PROCEDURE — 84425 ASSAY OF VITAMIN B-1: CPT

## 2024-01-15 PROCEDURE — 83550 IRON BINDING TEST: CPT

## 2024-01-15 PROCEDURE — 83036 HEMOGLOBIN GLYCOSYLATED A1C: CPT

## 2024-01-15 PROCEDURE — 36415 COLL VENOUS BLD VENIPUNCTURE: CPT

## 2024-01-15 PROCEDURE — 83735 ASSAY OF MAGNESIUM: CPT

## 2024-01-18 LAB — ZINC SERPL-MCNC: 78.1 UG/DL (ref 60–120)

## 2024-01-19 LAB — VIT B1 PYROPHOSHATE BLD-SCNC: 161 NMOL/L (ref 70–180)

## 2024-01-22 SDOH — ECONOMIC STABILITY: INCOME INSECURITY: HOW HARD IS IT FOR YOU TO PAY FOR THE VERY BASICS LIKE FOOD, HOUSING, MEDICAL CARE, AND HEATING?: SOMEWHAT HARD

## 2024-01-22 SDOH — ECONOMIC STABILITY: HOUSING INSECURITY
IN THE LAST 12 MONTHS, WAS THERE A TIME WHEN YOU DID NOT HAVE A STEADY PLACE TO SLEEP OR SLEPT IN A SHELTER (INCLUDING NOW)?: NO

## 2024-01-22 SDOH — ECONOMIC STABILITY: FOOD INSECURITY: WITHIN THE PAST 12 MONTHS, THE FOOD YOU BOUGHT JUST DIDN'T LAST AND YOU DIDN'T HAVE MONEY TO GET MORE.: SOMETIMES TRUE

## 2024-01-22 SDOH — ECONOMIC STABILITY: FOOD INSECURITY: WITHIN THE PAST 12 MONTHS, YOU WORRIED THAT YOUR FOOD WOULD RUN OUT BEFORE YOU GOT MONEY TO BUY MORE.: SOMETIMES TRUE

## 2024-01-23 ENCOUNTER — OFFICE VISIT (OUTPATIENT)
Dept: OBGYN | Age: 29
End: 2024-01-23
Payer: MEDICAID

## 2024-01-23 VITALS
WEIGHT: 164 LBS | HEIGHT: 63 IN | HEART RATE: 84 BPM | BODY MASS INDEX: 29.06 KG/M2 | SYSTOLIC BLOOD PRESSURE: 106 MMHG | DIASTOLIC BLOOD PRESSURE: 74 MMHG

## 2024-01-23 DIAGNOSIS — Z01.419 ENCOUNTER FOR ANNUAL ROUTINE GYNECOLOGICAL EXAMINATION: Primary | ICD-10-CM

## 2024-01-23 DIAGNOSIS — N94.10 DYSPAREUNIA, FEMALE: ICD-10-CM

## 2024-01-23 DIAGNOSIS — N95.2 ATROPHIC VAGINITIS: ICD-10-CM

## 2024-01-23 PROCEDURE — 99395 PREV VISIT EST AGE 18-39: CPT | Performed by: OBSTETRICS & GYNECOLOGY

## 2024-01-23 PROCEDURE — G8484 FLU IMMUNIZE NO ADMIN: HCPCS | Performed by: OBSTETRICS & GYNECOLOGY

## 2024-01-23 RX ORDER — ESTRADIOL 10 UG/1
10 INSERT VAGINAL
Qty: 12 TABLET | Refills: 11 | Status: SHIPPED | OUTPATIENT
Start: 2024-01-24

## 2024-01-23 ASSESSMENT — PATIENT HEALTH QUESTIONNAIRE - PHQ9
SUM OF ALL RESPONSES TO PHQ QUESTIONS 1-9: 6
1. LITTLE INTEREST OR PLEASURE IN DOING THINGS: 0
6. FEELING BAD ABOUT YOURSELF - OR THAT YOU ARE A FAILURE OR HAVE LET YOURSELF OR YOUR FAMILY DOWN: 0
2. FEELING DOWN, DEPRESSED OR HOPELESS: 0
SUM OF ALL RESPONSES TO PHQ QUESTIONS 1-9: 6
3. TROUBLE FALLING OR STAYING ASLEEP: 3
7. TROUBLE CONCENTRATING ON THINGS, SUCH AS READING THE NEWSPAPER OR WATCHING TELEVISION: 0
9. THOUGHTS THAT YOU WOULD BE BETTER OFF DEAD, OR OF HURTING YOURSELF: 0
5. POOR APPETITE OR OVEREATING: 0
8. MOVING OR SPEAKING SO SLOWLY THAT OTHER PEOPLE COULD HAVE NOTICED. OR THE OPPOSITE, BEING SO FIGETY OR RESTLESS THAT YOU HAVE BEEN MOVING AROUND A LOT MORE THAN USUAL: 0
SUM OF ALL RESPONSES TO PHQ9 QUESTIONS 1 & 2: 0
10. IF YOU CHECKED OFF ANY PROBLEMS, HOW DIFFICULT HAVE THESE PROBLEMS MADE IT FOR YOU TO DO YOUR WORK, TAKE CARE OF THINGS AT HOME, OR GET ALONG WITH OTHER PEOPLE: 0
4. FEELING TIRED OR HAVING LITTLE ENERGY: 3

## 2024-01-23 NOTE — PROGRESS NOTES
1/23/24    Estefani Rene  1995    Chief Complaint   Patient presents with    Gynecologic Exam     Pt here for annual, is sexually active, had hyster without ovary removal, hrt-estradiol, pap-2016-neg. Pt requesting refill on estradiol.         Estefani Rene is a 28 y.o. female who presents today for evaluation of annual exam    Past Medical History:   Diagnosis Date    37 weeks gestation of pregnancy 06/28/2016    Anemia     Celiac disease     COVID-19 01/01/2022    Positive test (see under media)    Diverticulosis     Dyspareunia in female     Fatigue     Gastritis     H/O bariatric surgery     H/O echocardiogram 05/21/2021    EF 55-60% no evidence of pericardial effusion no significant valvular regurgitation    History of blood transfusion 2014    Received 5 units after childbirth    History of postpartum hemorrhage, currently pregnant in third trimester 06/28/2016    Hx of cardiovascular stress test 05/07/2021    Normal study    Irritable bowel syndrome with constipation     Menopausal symptoms     Migraines     Preeclampsia 2014    Sciatica        Past Surgical History:   Procedure Laterality Date    CHOLECYSTECTOMY  12/13/2018    COLONOSCOPY      DENTAL SURGERY      wisdom teeth    DILATION AND CURETTAGE OF UTERUS  2018    HYSTERECTOMY (CERVIX STATUS UNKNOWN)  10/2018    SALPINGECTOMY Bilateral 2016    SLEEVE GASTRECTOMY N/A 02/16/2022    GASTRECTOMY SLEEVE LAPAROSCOPIC ROBOTIC performed by Wade Odom II, MD at [facility] OR    UPPER GASTROINTESTINAL ENDOSCOPY N/A 04/12/2021    EGD BIOPSY performed by Gilbert Robertson MD at [facility] ENDOSCOPY       Social History     Tobacco Use    Smoking status: Never    Smokeless tobacco: Never   Vaping Use    Vaping Use: Never used   Substance Use Topics    Alcohol use: Yes     Comment: rarely    Drug use: Not Currently     Frequency: 7.0 times per week     Comment: CBD GUMMIES       Family History   Problem Relation Age of Onset    Stomach Cancer Maternal Grandfather

## 2024-01-24 RX ORDER — SUCRALFATE 1 G/1
1 TABLET ORAL 4 TIMES DAILY
Qty: 120 TABLET | Refills: 3 | OUTPATIENT
Start: 2024-01-24

## 2024-02-12 NOTE — PROGRESS NOTES
Shayna Rene  1995  27 y.o. HPI Michel Hutton is here for pre op weigh in. Patient was started on liver shrinking diet on 9/27/2021 and lost -5.8 over 2 week slim fast diet and -39 overall. Current Body mass index is Body mass index is 37.35 kg/m². Shayla Carter BMI started at Body mass index is 37.35 kg/m². .    - All labs reviewed and questions answered. - Aware to continue diet until surgery and educated on post op diet stages. - Covid test on 10/05/2021    SUBJECT MIKHAIL:    Chief Complaint   Patient presents with    Pre-op Exam     pre op drink     Past Medical History:   Diagnosis Date    37 weeks gestation of pregnancy 06/28/2016    Anemia     H/O echocardiogram 05/21/2021    EF 55-60% no evidence of pericardial effusion no significant valvular regurgitation    History of postpartum hemorrhage, currently pregnant in third trimester 06/28/2016    Hx of cardiovascular stress test 05/07/2021    Normal study    Migraines     Preeclampsia 2014    Sciatica      Past Surgical History:   Procedure Laterality Date    CHOLECYSTECTOMY  12/13/2018    DENTAL SURGERY      wisdom teeth    HYSTERECTOMY      SALPINGECTOMY      UPPER GASTROINTESTINAL ENDOSCOPY N/A 4/12/2021    EGD BIOPSY performed by Rory Fuller MD at [facility] ENDOSCOPY     Current Outpatient Medications   Medication Sig Dispense Refill    EPINEPHrine (EPIPEN 2-CLEOPATRA) 0.3 MG/0.3ML SOAJ injection Use as directed for allergic reaction 2 each 0    nystatin (MYCOSTATIN) 424408 UNIT/GM powder Apply 3 times daily. 1 Bottle 5    nystatin (MYCOSTATIN) 484402 UNIT/GM ointment Apply topically 2 times daily.  1 Tube 0    melatonin 5 MG TABS tablet       propranolol (INDERAL) 20 MG tablet       buPROPion (WELLBUTRIN SR) 100 MG extended release tablet       busPIRone (BUSPAR) 15 MG tablet       Multiple Vitamins-Minerals (THERAPEUTIC MULTIVITAMIN-MINERALS) tablet Take 1 tablet by mouth daily (Patient not taking: Reported on 9/30/2021)      sertraline (ZOLOFT) 50 MG tablet Take 1 tablet by mouth daily 30 tablet 5     No current facility-administered medications for this visit. Family History   Problem Relation Age of Onset    Heart Disease Mother     Cancer Mother     Diabetes Father     High Blood Pressure Father     Coronary Art Dis Father     Heart Disease Brother     High Blood Pressure Brother     Learning Disabilities Brother     Diabetes Brother     Cancer Maternal Grandfather      Allergies   Allergen Reactions    Latex Hives and Rash    Macrobid [Nitrofurantoin Macrocrystal] Hives     No SOB or closing of throat noted when pt had reaction      Norco [Hydrocodone-Acetaminophen]      Makes her really sick to her stomach; hard to wake up with,it keeps her asleep    Other      Prolene suture    Ropivacaine     Tape [Adhesive Tape] Rash        Review of Systems   Constitutional: Negative for fatigue and fever. HENT: Negative for congestion, dental problem and sore throat. Eyes: Negative for photophobia and visual disturbance. Respiratory: Negative for apnea, chest tightness, shortness of breath and wheezing. Cardiovascular: Negative for chest pain and leg swelling. Gastrointestinal: Negative for diarrhea and nausea. Endocrine: Negative for cold intolerance and heat intolerance. Genitourinary: Negative for difficulty urinating, dysuria, flank pain, frequency and hematuria. Musculoskeletal: Negative for arthralgias and back pain. Skin: Negative for color change, rash and wound. Allergic/Immunologic: Negative for environmental allergies, food allergies and immunocompromised state. Neurological: Negative for dizziness, weakness, light-headedness and numbness. Hematological: Negative for adenopathy. Does not bruise/bleed easily. Psychiatric/Behavioral: Negative for behavioral problems, confusion, sleep disturbance and suicidal ideas.        OBJECTIVE:     /80   Pulse 76   Ht 5' 3.25\" (1.607 m)   Wt 212 lb 8 oz (96.4 kg)   LMP 10/18/2018 (Approximate)   SpO2 96%   BMI 37.35 kg/m²   Wt Readings from Last 3 Encounters:   10/08/21 212 lb 8 oz (96.4 kg)   10/05/21 211 lb 11.2 oz (96 kg)   09/30/21 214 lb 11.2 oz (97.4 kg)       Physical Exam  Vitals reviewed. Constitutional:       Appearance: She is obese. HENT:      Head: Normocephalic and atraumatic. Right Ear: External ear normal.      Left Ear: External ear normal.      Nose: Nose normal.      Mouth/Throat:      Mouth: Mucous membranes are moist.   Eyes:      Extraocular Movements: Extraocular movements intact. Pupils: Pupils are equal, round, and reactive to light. Cardiovascular:      Rate and Rhythm: Normal rate and regular rhythm. Pulses: Normal pulses. Heart sounds: Normal heart sounds. Pulmonary:      Effort: Pulmonary effort is normal.      Breath sounds: Normal breath sounds. Abdominal:      General: Bowel sounds are normal.   Musculoskeletal:         General: Normal range of motion. Cervical back: Normal range of motion and neck supple. Skin:     General: Skin is warm and dry. Neurological:      General: No focal deficit present. Mental Status: She is alert and oriented to person, place, and time. Mental status is at baseline. Psychiatric:         Mood and Affect: Mood normal.         Behavior: Behavior normal.       ASSESSMENT/ PLAN:    1. Pre-op evaluation  - Scheduled for planned sleeve gastrectomy. - Discussed pre surgical drink and when to take it. - Did very well on 2 week Pre op diet, lost -5.8 lbs and -39 total.   - BMI 37.35 now and started at 44.20.   - Labs reviewed and all WNL. - Discussed surgery and post op course along with diet stages following surgery.   - MVI prescription given. - Call with any questions or concerns. No orders of the defined types were placed in this encounter. Return in about 1 month (around 11/8/2021).     Manuel Fonseca, APRN - CNP, CNP No

## 2024-03-04 ENCOUNTER — OFFICE VISIT (OUTPATIENT)
Dept: BARIATRICS/WEIGHT MGMT | Age: 29
End: 2024-03-04
Payer: COMMERCIAL

## 2024-03-04 VITALS
SYSTOLIC BLOOD PRESSURE: 110 MMHG | HEIGHT: 63 IN | DIASTOLIC BLOOD PRESSURE: 80 MMHG | WEIGHT: 167.1 LBS | OXYGEN SATURATION: 99 % | BODY MASS INDEX: 29.61 KG/M2 | HEART RATE: 73 BPM

## 2024-03-04 DIAGNOSIS — R10.13 EPIGASTRIC PAIN: ICD-10-CM

## 2024-03-04 DIAGNOSIS — Z90.3 HISTORY OF SLEEVE GASTRECTOMY: Primary | ICD-10-CM

## 2024-03-04 PROCEDURE — G8484 FLU IMMUNIZE NO ADMIN: HCPCS | Performed by: NURSE PRACTITIONER

## 2024-03-04 PROCEDURE — G8427 DOCREV CUR MEDS BY ELIG CLIN: HCPCS | Performed by: NURSE PRACTITIONER

## 2024-03-04 PROCEDURE — G8417 CALC BMI ABV UP PARAM F/U: HCPCS | Performed by: NURSE PRACTITIONER

## 2024-03-04 PROCEDURE — 1036F TOBACCO NON-USER: CPT | Performed by: NURSE PRACTITIONER

## 2024-03-04 PROCEDURE — 99213 OFFICE O/P EST LOW 20 MIN: CPT | Performed by: NURSE PRACTITIONER

## 2024-03-04 NOTE — PROGRESS NOTES
BARIATRIC SURGERY OFFICE PROGRESS NOTE    SUBJECTIVE:    Patient presenting today referred from [facility], Pcp, for   Chief Complaint   Patient presents with    Weight Management     2 Yr F/U S/P S/G 2/16/2022   .    Vitals:    03/04/24 1530   BP: 110/80   Pulse: 73   SpO2: 99%        BMI: Body mass index is 29.37 kg/m².     Weight History:   Wt Readings from Last 3 Encounters:   03/04/24 75.8 kg (167 lb 1.6 oz)   01/23/24 74.4 kg (164 lb)   08/28/23 70.3 kg (155 lb)           HPI:Estefani Rene is a 28 y.o. female presenting in  bariatric 2 year POST-OP visit.    Total weight loss/gain:   -1.3 lbs since last visit  -42 lbs since surgery  -84.4 lbs since starting program    Changes in health since last visit: doing well with weight management; continues to have acid reflux daily and occasional epigastric pain despite dietary changes and taking Protonix daily    Pt tracking calories: 600-800 daily; protein 60-80 gms daily; gluten and lactose free foods    Pt exercising: 10-12,000 steps daily    Pt taking vitamins: daily    Fluid intake: good    Past Medical History:   Diagnosis Date    37 weeks gestation of pregnancy 06/28/2016    Anemia     Celiac disease     COVID-19 01/01/2022    Positive test (see under media)    Diverticulosis     Dyspareunia in female     Fatigue     Gastritis     H/O bariatric surgery     H/O echocardiogram 05/21/2021    EF 55-60% no evidence of pericardial effusion no significant valvular regurgitation    History of blood transfusion 2014    Received 5 units after childbirth    History of postpartum hemorrhage, currently pregnant in third trimester 06/28/2016    Hx of cardiovascular stress test 05/07/2021    Normal study    Irritable bowel syndrome with constipation     Menopausal symptoms     Migraines     Preeclampsia 2014    Sciatica         Patient Active Problem List   Diagnosis    Diarrhea    Non morbid obesity due to excess calories    Migraine without aura and without status migrainosus,

## 2024-03-07 RX ORDER — FAMOTIDINE 20 MG/1
20 TABLET, FILM COATED ORAL 2 TIMES DAILY
Qty: 60 TABLET | Refills: 3 | Status: SHIPPED | OUTPATIENT
Start: 2024-03-07

## 2024-03-18 RX ORDER — PANTOPRAZOLE SODIUM 20 MG/1
20 TABLET, DELAYED RELEASE ORAL
Qty: 30 TABLET | Refills: 5 | Status: SHIPPED | OUTPATIENT
Start: 2024-03-18

## 2024-03-24 NOTE — LETTER
[facility]  [address] [place] [address]  [address]  Phone: [phone]  Fax: [phone]    MIREYA Butts CNP        February 24, 2022     Patient: Izzy Cohen   YOB: 1995   Date of Visit: 2/24/2022       To Whom It May Concern: It is my medical opinion that Jovita Galloway may return to work on 2/28/22 with the following restrictions: lifting/carrying not to exceed 20 lbs. , pushing/pulling not to exceed 20 lbs for the next 3 weeks. May return to work on 3/21/22 with no restrictions. If you have any questions or concerns, please don't hesitate to call.     Sincerely,        MIREYA Butts CNP
[facility]  [address] [place] [address]  [address]  Phone: [phone]  Fax: [phone]    MIREYA Dozier CNP        February 24, 2022     Patient: Barrera Hearn   YOB: 1995   Date of Visit: 2/24/2022       To Whom It May Concern: It is my medical opinion that Bridger Hoff may return to work on 2/28/22 with the following restrictions: lifting/carrying not to exceed 20 lbs. , pushing/pulling not to exceed 20 lbs. If you have any questions or concerns, please don't hesitate to call.     Sincerely,        MIREYA Dozier CNP
Adi

## 2024-06-04 ENCOUNTER — OFFICE VISIT (OUTPATIENT)
Dept: BARIATRICS/WEIGHT MGMT | Age: 29
End: 2024-06-04
Payer: COMMERCIAL

## 2024-06-04 VITALS
HEART RATE: 98 BPM | SYSTOLIC BLOOD PRESSURE: 112 MMHG | DIASTOLIC BLOOD PRESSURE: 74 MMHG | OXYGEN SATURATION: 98 % | BODY MASS INDEX: 29.55 KG/M2 | WEIGHT: 166.8 LBS | HEIGHT: 63 IN

## 2024-06-04 DIAGNOSIS — K21.9 GASTROESOPHAGEAL REFLUX DISEASE, UNSPECIFIED WHETHER ESOPHAGITIS PRESENT: ICD-10-CM

## 2024-06-04 DIAGNOSIS — Z98.84 STATUS POST LAPAROSCOPIC SLEEVE GASTRECTOMY: Primary | ICD-10-CM

## 2024-06-04 PROCEDURE — G8417 CALC BMI ABV UP PARAM F/U: HCPCS | Performed by: SURGERY

## 2024-06-04 PROCEDURE — 99214 OFFICE O/P EST MOD 30 MIN: CPT | Performed by: SURGERY

## 2024-06-04 PROCEDURE — G8427 DOCREV CUR MEDS BY ELIG CLIN: HCPCS | Performed by: SURGERY

## 2024-06-04 PROCEDURE — 1036F TOBACCO NON-USER: CPT | Performed by: SURGERY

## 2024-06-04 RX ORDER — PANTOPRAZOLE SODIUM 40 MG/1
40 TABLET, DELAYED RELEASE ORAL
Qty: 60 TABLET | Refills: 0 | Status: SHIPPED | OUTPATIENT
Start: 2024-06-04 | End: 2024-09-02

## 2024-06-04 ASSESSMENT — ENCOUNTER SYMPTOMS: ABDOMINAL PAIN: 1

## 2024-06-04 NOTE — PROGRESS NOTES
BARIATRIC SURGERY OFFICE PROGRESS NOTE    SUBJECTIVE:    Patient presenting today referred from [facility], Pcp, for   Chief Complaint   Patient presents with    Bariatrics Post Op Follow Up     F/U S/P Gastric Sleeve, 2/16/22  Check GERD Symptoms   .    Vitals:    06/04/24 0843   BP: 112/74   Pulse: 98   SpO2: 98%        BMI: Body mass index is 29.31 kg/m².     Weight History:   Wt Readings from Last 3 Encounters:   06/04/24 75.7 kg (166 lb 12.8 oz)   03/04/24 75.8 kg (167 lb 1.6 oz)   01/23/24 74.4 kg (164 lb)       If within 30 days of bariatric surgery date, have you been to the ED: n/a    HPI:Estefani Rene is a 29 y.o. female presenting in  ~2 year 4 mo  bariatric post-OP visit.    Weight change:     -0.3 lbs since last visit.    -42.3 lbs since surgery.    -84.7 lbs since starting program.    Changes in health since last visit: still having reflux symptoms     Pre-op clearances completed: n/a    Pt tracking calories/protein: yes. Approximately 800-1000 kacey / d. Approximately 60+ g protein / d.     Pt exercising: yes    Pt taking vitamins: yes    Fluid intake: appropriate    Past Medical History:   Diagnosis Date    37 weeks gestation of pregnancy 06/28/2016    Anemia     Celiac disease     COVID-19 01/01/2022    Positive test (see under media)    Diverticulosis     Dyspareunia in female     Fatigue     Gastritis     H/O bariatric surgery     H/O echocardiogram 05/21/2021    EF 55-60% no evidence of pericardial effusion no significant valvular regurgitation    History of blood transfusion 2014    Received 5 units after childbirth    History of postpartum hemorrhage, currently pregnant in third trimester 06/28/2016    Hx of cardiovascular stress test 05/07/2021    Normal study    Irritable bowel syndrome with constipation     Menopausal symptoms     Migraines     Preeclampsia 2014    Sciatica         Patient Active Problem List   Diagnosis    Diarrhea    Non morbid obesity due to excess calories    Migraine without

## 2024-07-30 RX ORDER — PANTOPRAZOLE SODIUM 40 MG/1
40 TABLET, DELAYED RELEASE ORAL
Qty: 180 TABLET | Refills: 0 | Status: SHIPPED | OUTPATIENT
Start: 2024-07-30 | End: 2024-10-28

## 2024-09-03 ENCOUNTER — OFFICE VISIT (OUTPATIENT)
Dept: BARIATRICS/WEIGHT MGMT | Age: 29
End: 2024-09-03
Payer: COMMERCIAL

## 2024-09-03 VITALS
SYSTOLIC BLOOD PRESSURE: 104 MMHG | HEIGHT: 63 IN | HEART RATE: 83 BPM | BODY MASS INDEX: 30.6 KG/M2 | DIASTOLIC BLOOD PRESSURE: 60 MMHG | WEIGHT: 172.7 LBS | OXYGEN SATURATION: 97 %

## 2024-09-03 DIAGNOSIS — K21.9 GASTROESOPHAGEAL REFLUX DISEASE, UNSPECIFIED WHETHER ESOPHAGITIS PRESENT: ICD-10-CM

## 2024-09-03 DIAGNOSIS — Z90.3 H/O GASTRIC SLEEVE: Primary | ICD-10-CM

## 2024-09-03 PROCEDURE — G8427 DOCREV CUR MEDS BY ELIG CLIN: HCPCS | Performed by: SURGERY

## 2024-09-03 PROCEDURE — 1036F TOBACCO NON-USER: CPT | Performed by: SURGERY

## 2024-09-03 PROCEDURE — 99213 OFFICE O/P EST LOW 20 MIN: CPT | Performed by: SURGERY

## 2024-09-03 PROCEDURE — G8417 CALC BMI ABV UP PARAM F/U: HCPCS | Performed by: SURGERY

## 2024-09-03 RX ORDER — SODIUM CHLORIDE 0.9 % (FLUSH) 0.9 %
5-40 SYRINGE (ML) INJECTION PRN
OUTPATIENT
Start: 2024-09-03

## 2024-09-03 RX ORDER — SODIUM CHLORIDE 0.9 % (FLUSH) 0.9 %
5-40 SYRINGE (ML) INJECTION EVERY 12 HOURS SCHEDULED
OUTPATIENT
Start: 2024-09-03

## 2024-09-03 RX ORDER — SODIUM CHLORIDE, SODIUM LACTATE, POTASSIUM CHLORIDE, CALCIUM CHLORIDE 600; 310; 30; 20 MG/100ML; MG/100ML; MG/100ML; MG/100ML
INJECTION, SOLUTION INTRAVENOUS CONTINUOUS
OUTPATIENT
Start: 2024-09-03

## 2024-09-03 RX ORDER — SODIUM CHLORIDE 9 MG/ML
25 INJECTION, SOLUTION INTRAVENOUS PRN
OUTPATIENT
Start: 2024-09-03

## 2024-09-03 ASSESSMENT — ENCOUNTER SYMPTOMS: ABDOMINAL PAIN: 1

## 2024-09-03 NOTE — PROGRESS NOTES
Uterine Cancer Mother     Stomach Cancer Mother     Heart Disease Brother     High Blood Pressure Brother     Learning Disabilities Brother     Diabetes Brother     Bell's Palsy Brother     Breast Cancer Maternal Great Grandmother      Social History     Socioeconomic History    Marital status:      Spouse name: Not on file    Number of children: Not on file    Years of education: Not on file    Highest education level: Not on file   Occupational History    Not on file   Tobacco Use    Smoking status: Never    Smokeless tobacco: Never   Vaping Use    Vaping status: Never Used   Substance and Sexual Activity    Alcohol use: Yes     Comment: rarely    Drug use: Not Currently     Frequency: 7.0 times per week     Comment: CBD GUMMIES    Sexual activity: Yes     Partners: Male   Other Topics Concern    Not on file   Social History Narrative    Not on file     Social Determinants of Health     Financial Resource Strain: Medium Risk (1/22/2024)    Overall Financial Resource Strain (CARDIA)     Difficulty of Paying Living Expenses: Somewhat hard   Food Insecurity: Food Insecurity Present (1/22/2024)    Hunger Vital Sign     Worried About Running Out of Food in the Last Year: Sometimes true     Ran Out of Food in the Last Year: Sometimes true   Transportation Needs: Unknown (1/22/2024)    PRAPARE - Transportation     Lack of Transportation (Medical): Not on file     Lack of Transportation (Non-Medical): No   Physical Activity: Not on file   Stress: Not on file   Social Connections: Not on file   Intimate Partner Violence: Not on file   Housing Stability: Unknown (1/22/2024)    Housing Stability Vital Sign     Unable to Pay for Housing in the Last Year: Not on file     Number of Places Lived in the Last Year: Not on file     Unstable Housing in the Last Year: No       Current Outpatient Medications   Medication Sig Dispense Refill    pantoprazole (PROTONIX) 40 MG tablet TAKE 1 TABLET BY MOUTH 2 TIMES DAILY (BEFORE

## 2024-09-04 ENCOUNTER — PREP FOR PROCEDURE (OUTPATIENT)
Dept: BARIATRICS/WEIGHT MGMT | Age: 29
End: 2024-09-04

## 2024-09-04 PROBLEM — K21.9 GASTROESOPHAGEAL REFLUX DISEASE: Status: ACTIVE | Noted: 2024-09-04

## 2024-09-06 ENCOUNTER — TELEPHONE (OUTPATIENT)
Dept: BARIATRICS/WEIGHT MGMT | Age: 29
End: 2024-09-06

## 2024-09-06 NOTE — TELEPHONE ENCOUNTER
SPOKE TO  Estefani Rene REGARDING SURGERY (EGD) SCHEDULED @ [facility]. NOTIFIED OF DATES, TIMES AND LOCATION    PHONE ASSESSMENT   SURGERY - 9/20 @ 8   P/O - 10/1 @ 10:45      HOLD BLOOD THINNERS - NA

## 2024-09-12 ENCOUNTER — OFFICE VISIT (OUTPATIENT)
Dept: BARIATRICS/WEIGHT MGMT | Age: 29
End: 2024-09-12

## 2024-09-12 VITALS — HEIGHT: 63 IN | WEIGHT: 170.5 LBS | BODY MASS INDEX: 30.21 KG/M2

## 2024-09-12 DIAGNOSIS — E66.3 OVERWEIGHT (BMI 25.0-29.9): Primary | ICD-10-CM

## 2024-09-19 ENCOUNTER — ANESTHESIA EVENT (OUTPATIENT)
Dept: ENDOSCOPY | Age: 29
End: 2024-09-19
Payer: COMMERCIAL

## 2024-09-20 ENCOUNTER — ANESTHESIA (OUTPATIENT)
Dept: ENDOSCOPY | Age: 29
End: 2024-09-20
Payer: COMMERCIAL

## 2024-09-20 ENCOUNTER — HOSPITAL ENCOUNTER (OUTPATIENT)
Age: 29
Setting detail: OUTPATIENT SURGERY
Discharge: HOME | End: 2024-09-20
Attending: SURGERY | Admitting: SURGERY
Payer: COMMERCIAL

## 2024-09-20 VITALS
TEMPERATURE: 97.5 F | WEIGHT: 167 LBS | DIASTOLIC BLOOD PRESSURE: 64 MMHG | BODY MASS INDEX: 29.59 KG/M2 | OXYGEN SATURATION: 99 % | HEIGHT: 63 IN | HEART RATE: 72 BPM | SYSTOLIC BLOOD PRESSURE: 109 MMHG | RESPIRATION RATE: 16 BRPM

## 2024-09-20 DIAGNOSIS — K21.9 GASTROESOPHAGEAL REFLUX DISEASE: ICD-10-CM

## 2024-09-20 PROCEDURE — 6360000002 HC RX W HCPCS: Performed by: NURSE ANESTHETIST, CERTIFIED REGISTERED

## 2024-09-20 PROCEDURE — 88305 TISSUE EXAM BY PATHOLOGIST: CPT

## 2024-09-20 PROCEDURE — 7100000011 HC PHASE II RECOVERY - ADDTL 15 MIN: Performed by: SURGERY

## 2024-09-20 PROCEDURE — 3609012400 HC EGD TRANSORAL BIOPSY SINGLE/MULTIPLE: Performed by: SURGERY

## 2024-09-20 PROCEDURE — 7100000010 HC PHASE II RECOVERY - FIRST 15 MIN: Performed by: SURGERY

## 2024-09-20 PROCEDURE — 2709999900 HC NON-CHARGEABLE SUPPLY: Performed by: SURGERY

## 2024-09-20 PROCEDURE — 43239 EGD BIOPSY SINGLE/MULTIPLE: CPT | Performed by: SURGERY

## 2024-09-20 PROCEDURE — 2580000003 HC RX 258: Performed by: SURGERY

## 2024-09-20 PROCEDURE — 3700000001 HC ADD 15 MINUTES (ANESTHESIA): Performed by: SURGERY

## 2024-09-20 PROCEDURE — 3700000000 HC ANESTHESIA ATTENDED CARE: Performed by: SURGERY

## 2024-09-20 RX ORDER — PROPOFOL 10 MG/ML
INJECTION, EMULSION INTRAVENOUS
Status: DISCONTINUED | OUTPATIENT
Start: 2024-09-20 | End: 2024-09-20

## 2024-09-20 RX ORDER — PROPOFOL 10 MG/ML
INJECTION, EMULSION INTRAVENOUS
Status: DISCONTINUED | OUTPATIENT
Start: 2024-09-20 | End: 2024-09-20 | Stop reason: SDUPTHER

## 2024-09-20 RX ORDER — SODIUM CHLORIDE 0.9 % (FLUSH) 0.9 %
5-40 SYRINGE (ML) INJECTION PRN
Status: DISCONTINUED | OUTPATIENT
Start: 2024-09-20 | End: 2024-09-20 | Stop reason: HOSPADM

## 2024-09-20 RX ORDER — SODIUM CHLORIDE 9 MG/ML
25 INJECTION, SOLUTION INTRAVENOUS PRN
Status: DISCONTINUED | OUTPATIENT
Start: 2024-09-20 | End: 2024-09-20 | Stop reason: HOSPADM

## 2024-09-20 RX ORDER — SODIUM CHLORIDE 0.9 % (FLUSH) 0.9 %
5-40 SYRINGE (ML) INJECTION EVERY 12 HOURS SCHEDULED
Status: DISCONTINUED | OUTPATIENT
Start: 2024-09-20 | End: 2024-09-20 | Stop reason: HOSPADM

## 2024-09-20 RX ORDER — SODIUM CHLORIDE, SODIUM LACTATE, POTASSIUM CHLORIDE, CALCIUM CHLORIDE 600; 310; 30; 20 MG/100ML; MG/100ML; MG/100ML; MG/100ML
INJECTION, SOLUTION INTRAVENOUS CONTINUOUS
Status: DISCONTINUED | OUTPATIENT
Start: 2024-09-20 | End: 2024-09-20 | Stop reason: HOSPADM

## 2024-09-20 RX ADMIN — SODIUM CHLORIDE, POTASSIUM CHLORIDE, SODIUM LACTATE AND CALCIUM CHLORIDE: 600; 310; 30; 20 INJECTION, SOLUTION INTRAVENOUS at 07:02

## 2024-09-20 RX ADMIN — PROPOFOL 150 MG: 10 INJECTION, EMULSION INTRAVENOUS at 08:05

## 2024-09-20 ASSESSMENT — PAIN - FUNCTIONAL ASSESSMENT
PAIN_FUNCTIONAL_ASSESSMENT: 0-10

## 2024-09-24 LAB — SURGICAL PATHOLOGY REPORT: NORMAL

## 2024-10-08 ENCOUNTER — OFFICE VISIT (OUTPATIENT)
Dept: BARIATRICS/WEIGHT MGMT | Age: 29
End: 2024-10-08
Payer: COMMERCIAL

## 2024-10-08 VITALS
OXYGEN SATURATION: 98 % | SYSTOLIC BLOOD PRESSURE: 100 MMHG | WEIGHT: 169.7 LBS | HEIGHT: 63 IN | DIASTOLIC BLOOD PRESSURE: 78 MMHG | BODY MASS INDEX: 30.07 KG/M2 | HEART RATE: 86 BPM

## 2024-10-08 DIAGNOSIS — A04.8 H. PYLORI INFECTION: Primary | ICD-10-CM

## 2024-10-08 DIAGNOSIS — Q45.3: ICD-10-CM

## 2024-10-08 PROCEDURE — G8484 FLU IMMUNIZE NO ADMIN: HCPCS | Performed by: SURGERY

## 2024-10-08 PROCEDURE — 1036F TOBACCO NON-USER: CPT | Performed by: SURGERY

## 2024-10-08 PROCEDURE — 99214 OFFICE O/P EST MOD 30 MIN: CPT | Performed by: SURGERY

## 2024-10-08 PROCEDURE — G8417 CALC BMI ABV UP PARAM F/U: HCPCS | Performed by: SURGERY

## 2024-10-08 PROCEDURE — G8427 DOCREV CUR MEDS BY ELIG CLIN: HCPCS | Performed by: SURGERY

## 2024-10-08 RX ORDER — TETRACYCLINE HYDROCHLORIDE 500 MG/1
500 CAPSULE ORAL 4 TIMES DAILY
Qty: 56 CAPSULE | Refills: 0 | Status: SHIPPED | OUTPATIENT
Start: 2024-10-08 | End: 2024-10-22

## 2024-10-08 RX ORDER — METRONIDAZOLE 500 MG/1
500 TABLET ORAL 3 TIMES DAILY
Qty: 42 TABLET | Refills: 0 | Status: SHIPPED | OUTPATIENT
Start: 2024-10-08 | End: 2024-10-22

## 2024-10-08 ASSESSMENT — ENCOUNTER SYMPTOMS: ABDOMINAL PAIN: 1

## 2024-10-08 NOTE — PROGRESS NOTES
Chief Complaint   Patient presents with    Follow-up     E[facility] 9/20 check media for pictures           SUBJECTIVE:  HPI: Patient is here with complaints of:    F/u EGD.    Still with some issues.         I have reviewed the patient's(pertinent information to this visit) medical history, family history(scanned in  the Med[employer] under \"patient questioner\"), social history and review of systems with the patient today in the office.            Past Surgical History:   Procedure Laterality Date    CHOLECYSTECTOMY  12/13/2018    COLONOSCOPY      DENTAL SURGERY      wisdom teeth    DILATION AND CURETTAGE OF UTERUS  2018    HYSTERECTOMY (CERVIX STATUS UNKNOWN)  10/2018    SALPINGECTOMY Bilateral 2016    SLEEVE GASTRECTOMY N/A 02/16/2022    GASTRECTOMY SLEEVE LAPAROSCOPIC ROBOTIC performed by Wade Odom II, MD at [facility] OR    UPPER GASTROINTESTINAL ENDOSCOPY N/A 04/12/2021    EGD BIOPSY performed by Gilbert Robertson MD at [facility] ENDOSCOPY    UPPER GASTROINTESTINAL ENDOSCOPY N/A 9/20/2024    ESOPHAGOGASTRODUODENOSCOPY BIOPSY performed by Wade Odom II, MD at [facility] ENDOSCOPY     Past Medical History:   Diagnosis Date    37 weeks gestation of pregnancy 06/28/2016    Anemia     Celiac disease     COVID-19 01/01/2022    Positive test (see under media)    Diverticulosis     Dyspareunia in female     Fatigue     Gastritis     H/O bariatric surgery     H/O echocardiogram 05/21/2021    EF 55-60% no evidence of pericardial effusion no significant valvular regurgitation    History of blood transfusion 2014    Received 5 units after childbirth    History of postpartum hemorrhage, currently pregnant in third trimester 06/28/2016    Hx of cardiovascular stress test 05/07/2021    Normal study    Irritable bowel syndrome with constipation     Menopausal symptoms     Migraines     Preeclampsia 2014    Sciatica      Family History   Problem Relation Age of Onset    Stomach Cancer Maternal Grandfather     Diabetes Father     High Blood

## 2024-10-15 ENCOUNTER — TELEPHONE (OUTPATIENT)
Dept: GASTROENTEROLOGY | Age: 29
End: 2024-10-15

## 2024-10-15 NOTE — TELEPHONE ENCOUNTER
Called pt. 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 Made appt for pt to see dr. Zhou on 10/30/24 @8:45am

## 2024-10-18 ENCOUNTER — TELEPHONE (OUTPATIENT)
Dept: BARIATRICS/WEIGHT MGMT | Age: 29
End: 2024-10-18

## 2024-10-18 NOTE — TELEPHONE ENCOUNTER
Estefani called in stating that she has been experiencing a weird side effect of tasting and smelling latex. Upon speaking with her she states that her stools smell like latex too. Informed her that this is a side effect of the bismuth in the treatment plan for her H.Pylori infection. Explained that bismuth is designed to coat and that it is the primary ingredient in Pepto Bismol. Explained that this is the treatment of choice due to her allergy for amoxicillin. Encouraged her to monitor for any signs of allergic reactions or new side effects.

## 2024-10-30 ENCOUNTER — OFFICE VISIT (OUTPATIENT)
Dept: GASTROENTEROLOGY | Age: 29
End: 2024-10-30
Payer: COMMERCIAL

## 2024-10-30 VITALS
HEIGHT: 63 IN | OXYGEN SATURATION: 99 % | WEIGHT: 172.4 LBS | RESPIRATION RATE: 19 BRPM | BODY MASS INDEX: 30.55 KG/M2 | SYSTOLIC BLOOD PRESSURE: 100 MMHG | DIASTOLIC BLOOD PRESSURE: 72 MMHG | HEART RATE: 100 BPM

## 2024-10-30 DIAGNOSIS — K29.70 HELICOBACTER PYLORI GASTRITIS: ICD-10-CM

## 2024-10-30 DIAGNOSIS — K21.9 GASTROESOPHAGEAL REFLUX DISEASE, UNSPECIFIED WHETHER ESOPHAGITIS PRESENT: ICD-10-CM

## 2024-10-30 DIAGNOSIS — Z98.84 S/P LAPAROSCOPIC SLEEVE GASTRECTOMY: ICD-10-CM

## 2024-10-30 DIAGNOSIS — K20.90 ESOPHAGITIS: Primary | ICD-10-CM

## 2024-10-30 DIAGNOSIS — B96.81 HELICOBACTER PYLORI GASTRITIS: ICD-10-CM

## 2024-10-30 PROCEDURE — G8427 DOCREV CUR MEDS BY ELIG CLIN: HCPCS | Performed by: INTERNAL MEDICINE

## 2024-10-30 PROCEDURE — 99204 OFFICE O/P NEW MOD 45 MIN: CPT | Performed by: INTERNAL MEDICINE

## 2024-10-30 PROCEDURE — G8417 CALC BMI ABV UP PARAM F/U: HCPCS | Performed by: INTERNAL MEDICINE

## 2024-10-30 PROCEDURE — 1036F TOBACCO NON-USER: CPT | Performed by: INTERNAL MEDICINE

## 2024-10-30 PROCEDURE — G8484 FLU IMMUNIZE NO ADMIN: HCPCS | Performed by: INTERNAL MEDICINE

## 2024-10-30 RX ORDER — OMEPRAZOLE 40 MG/1
40 CAPSULE, DELAYED RELEASE ORAL
Qty: 30 CAPSULE | Refills: 3 | Status: SHIPPED | OUTPATIENT
Start: 2024-10-30 | End: 2025-02-27

## 2024-10-30 RX ORDER — SUCRALFATE 1 G/1
1 TABLET ORAL
Qty: 120 TABLET | Refills: 0 | Status: SHIPPED | OUTPATIENT
Start: 2024-10-30

## 2024-10-30 NOTE — PROGRESS NOTES
[facility] Gastroenterology and Hepatology             MD Bryson Gil MD Carol Christensen, APRN-CNP       Lakesha Velez, APRN-CNP             [address] [place] [address] [phone] fax [phone]        Gastroenterology Clinic Consultation    Bryson Zhou MD  Encounter Date: 10/30/24     CC: New Patient (PANCREATIC HETEROTOPIA/)       Wade Odom II, MD  [address] [facility]  [address]  [place] [address]     History obtained from: patient, family, medical records     Subjective:       Estefani Rene is an 29 y.o. female with past medical history of morbid obesity status post sleeve gastrectomy, migraine, GERD who presents for New Patient (PANCREATIC HETEROTOPIA/)    Patient was recently evaluated by Dr. Stoll and due to her history of sleeve gastrectomy and complains of reflux underwent EGD on 09/20/2024.  EGD with normal duodenum.  Stomach with superficial gastritis, esophagus with reflux esophagitis biopsied.  Gastric biopsies were notable for mild chronic gastritis and H. pylori immunostain was positive for H. pylori-like microorganisms.  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    Patient then followed up with Dr. Stoll and had H. pylori treatment sent in.  She has been referred for pancreatic heterotopia. Of note the patient does have family hx of Gastric cancer in grandfather and also mentions that her mom had some gastric nodules removed. Her mother unfortunately passed away this year due to heart attack.      Patient Active Problem List   Diagnosis    Diarrhea    Non morbid obesity due to excess calories    Migraine without aura and without status migrainosus, not intractable    Depression    Muscle spasm of back    Anxiety    Latex allergy    S/P laparoscopic cholecystectomy    Morbid obesity with BMI of 40.0-44.9, adult    Esophageal reflux    Morbid obesity due to excess calories    S/P

## 2024-11-08 ENCOUNTER — HOSPITAL ENCOUNTER (OUTPATIENT)
Age: 29
Setting detail: SPECIMEN
Discharge: HOME | End: 2024-11-08
Payer: COMMERCIAL

## 2024-11-08 PROCEDURE — 87338 HPYLORI STOOL AG IA: CPT

## 2024-11-10 LAB — H PYLORI AG STL QL IA: NEGATIVE

## 2024-11-21 ENCOUNTER — OFFICE VISIT (OUTPATIENT)
Dept: BARIATRICS/WEIGHT MGMT | Age: 29
End: 2024-11-21
Payer: COMMERCIAL

## 2024-11-21 VITALS
DIASTOLIC BLOOD PRESSURE: 74 MMHG | SYSTOLIC BLOOD PRESSURE: 120 MMHG | HEART RATE: 95 BPM | OXYGEN SATURATION: 98 % | BODY MASS INDEX: 30.51 KG/M2 | HEIGHT: 63 IN | WEIGHT: 172.2 LBS

## 2024-11-21 DIAGNOSIS — Q45.3: ICD-10-CM

## 2024-11-21 DIAGNOSIS — A04.8 H. PYLORI INFECTION: Primary | ICD-10-CM

## 2024-11-21 DIAGNOSIS — K21.9 GASTROESOPHAGEAL REFLUX DISEASE WITHOUT ESOPHAGITIS: ICD-10-CM

## 2024-11-21 PROCEDURE — G8484 FLU IMMUNIZE NO ADMIN: HCPCS | Performed by: SURGERY

## 2024-11-21 PROCEDURE — 99214 OFFICE O/P EST MOD 30 MIN: CPT | Performed by: SURGERY

## 2024-11-21 PROCEDURE — G8417 CALC BMI ABV UP PARAM F/U: HCPCS | Performed by: SURGERY

## 2024-11-21 PROCEDURE — 1036F TOBACCO NON-USER: CPT | Performed by: SURGERY

## 2024-11-21 PROCEDURE — G8427 DOCREV CUR MEDS BY ELIG CLIN: HCPCS | Performed by: SURGERY

## 2024-11-21 RX ORDER — DOXYCYCLINE 100 MG/1
CAPSULE ORAL
COMMUNITY
Start: 2024-11-20

## 2024-11-21 ASSESSMENT — PATIENT HEALTH QUESTIONNAIRE - PHQ9
SUM OF ALL RESPONSES TO PHQ QUESTIONS 1-9: 0
1. LITTLE INTEREST OR PLEASURE IN DOING THINGS: NOT AT ALL
SUM OF ALL RESPONSES TO PHQ QUESTIONS 1-9: 0
SUM OF ALL RESPONSES TO PHQ QUESTIONS 1-9: 0
2. FEELING DOWN, DEPRESSED OR HOPELESS: NOT AT ALL
SUM OF ALL RESPONSES TO PHQ9 QUESTIONS 1 & 2: 0
SUM OF ALL RESPONSES TO PHQ QUESTIONS 1-9: 0

## 2024-11-21 ASSESSMENT — ENCOUNTER SYMPTOMS: ABDOMINAL PAIN: 1

## 2024-11-21 NOTE — PROGRESS NOTES
Chief Complaint   Patient presents with    Weight Management    Other     6wk FU H.Pylori test         SUBJECTIVE:  HPI: Patient is here with complaints of:    F/u.    Some improvement in symptoms. But overall, still affecting daily life.    Had braces put on teeth.     Weight up several lbs since last visit.     I have reviewed the patient's(pertinent information to this visit) medical history, family history(scanned in  the Med[employer] under \"patient questioner\"), social history and review of systems with the patient today in the office.            Past Surgical History:   Procedure Laterality Date    CHOLECYSTECTOMY  12/13/2018    COLONOSCOPY      DENTAL SURGERY      wisdom teeth    DILATION AND CURETTAGE OF UTERUS  2018    HYSTERECTOMY (CERVIX STATUS UNKNOWN)  10/2018    SALPINGECTOMY Bilateral 2016    SLEEVE GASTRECTOMY N/A 02/16/2022    GASTRECTOMY SLEEVE LAPAROSCOPIC ROBOTIC performed by Wade Odom II, MD at [facility] OR    UPPER GASTROINTESTINAL ENDOSCOPY N/A 04/12/2021    EGD BIOPSY performed by Gilbert Robertson MD at [facility] ENDOSCOPY    UPPER GASTROINTESTINAL ENDOSCOPY N/A 9/20/2024    ESOPHAGOGASTRODUODENOSCOPY BIOPSY performed by Wade Odom II, MD at [facility] ENDOSCOPY     Past Medical History:   Diagnosis Date    37 weeks gestation of pregnancy 06/28/2016    Anemia     Celiac disease     COVID-19 01/01/2022    Positive test (see under media)    Diverticulosis     Dyspareunia in female     Fatigue     Gastritis     H/O bariatric surgery     H/O echocardiogram 05/21/2021    EF 55-60% no evidence of pericardial effusion no significant valvular regurgitation    History of blood transfusion 2014    Received 5 units after childbirth    History of postpartum hemorrhage, currently pregnant in third trimester 06/28/2016    Hx of cardiovascular stress test 05/07/2021    Normal study    Irritable bowel syndrome with constipation     Menopausal symptoms     Migraines     Preeclampsia 2014    Sciatica      Family

## 2024-12-18 ENCOUNTER — OFFICE VISIT (OUTPATIENT)
Dept: GASTROENTEROLOGY | Age: 29
End: 2024-12-18
Payer: COMMERCIAL

## 2024-12-18 VITALS
HEART RATE: 92 BPM | SYSTOLIC BLOOD PRESSURE: 130 MMHG | WEIGHT: 170.8 LBS | OXYGEN SATURATION: 97 % | RESPIRATION RATE: 17 BRPM | HEIGHT: 63 IN | BODY MASS INDEX: 30.26 KG/M2 | DIASTOLIC BLOOD PRESSURE: 78 MMHG

## 2024-12-18 DIAGNOSIS — K20.90 ESOPHAGITIS: Primary | ICD-10-CM

## 2024-12-18 DIAGNOSIS — Z98.84 S/P LAPAROSCOPIC SLEEVE GASTRECTOMY: ICD-10-CM

## 2024-12-18 DIAGNOSIS — K21.9 GASTROESOPHAGEAL REFLUX DISEASE, UNSPECIFIED WHETHER ESOPHAGITIS PRESENT: ICD-10-CM

## 2024-12-18 PROCEDURE — 99213 OFFICE O/P EST LOW 20 MIN: CPT | Performed by: INTERNAL MEDICINE

## 2024-12-18 PROCEDURE — 1036F TOBACCO NON-USER: CPT | Performed by: INTERNAL MEDICINE

## 2024-12-18 PROCEDURE — G8427 DOCREV CUR MEDS BY ELIG CLIN: HCPCS | Performed by: INTERNAL MEDICINE

## 2024-12-18 PROCEDURE — G8417 CALC BMI ABV UP PARAM F/U: HCPCS | Performed by: INTERNAL MEDICINE

## 2024-12-18 PROCEDURE — G8484 FLU IMMUNIZE NO ADMIN: HCPCS | Performed by: INTERNAL MEDICINE

## 2024-12-18 RX ORDER — NAPROXEN 500 MG/1
500 TABLET ORAL 2 TIMES DAILY
COMMUNITY
Start: 2024-11-20

## 2024-12-18 RX ORDER — IPRATROPIUM BROMIDE 21 UG/1
SPRAY, METERED NASAL
COMMUNITY
Start: 2024-11-20

## 2024-12-18 NOTE — PROGRESS NOTES
in HPI.     Medications:    Current Outpatient Medications:     ipratropium (ATROVENT) 0.03 % nasal spray, INSTILL 2 PUFFS INTO EACH NOSTRIL FOUR TIMES A DAY AS NEEDED FOR CONGESTION, Disp: , Rfl:     naproxen (NAPROSYN) 500 MG tablet, Take 1 tablet by mouth 2 times daily, Disp: , Rfl:     Vonoprazan Fumarate 20 MG TABS, Take 20 mg by mouth daily, Disp: 30 tablet, Rfl: 1    doxycycline monohydrate (MONODOX) 100 MG capsule, , Disp: , Rfl:     omeprazole (PRILOSEC) 40 MG delayed release capsule, Take 1 capsule by mouth every morning (before breakfast), Disp: 30 capsule, Rfl: 3    Multiple Vitamin (MULTIVITAMIN ADULT PO), Take by mouth, Disp: , Rfl:     Estradiol (VAGIFEM) 10 MCG TABS vaginal tablet, Place 1 tablet vaginally three times a week, Disp: 12 tablet, Rfl: 11    calcium carbonate (OSCAL) 500 MG TABS tablet, Take 1 tablet by mouth daily, Disp: , Rfl:     Probiotic Product (PROBIOTIC-10 PO), Take by mouth, Disp: , Rfl:     FIBER ADULT GUMMIES PO, Take by mouth, Disp: , Rfl:     Docusate Sodium (STOOL SOFTENER LAXATIVE PO), Take by mouth, Disp: , Rfl:     nystatin (MYCOSTATIN) 745801 UNIT/GM powder, Apply 3 times daily. (Patient taking differently: as needed Apply 3 times daily.), Disp: 1 Bottle, Rfl: 5     Allergies:  Latex, Macrobid [nitrofurantoin macrocrystal], Ropivacaine, Amoxicillin, Lavender oil, Norco [hydrocodone-acetaminophen], Other, and Tape [adhesive tape]     Objective:    Blood pressure 130/78, pulse 92, resp. rate 17, height 1.6 m (5' 2.99\"), weight 77.5 kg (170 lb 12.8 oz), last menstrual period 10/18/2018, SpO2 97%, not currently breastfeeding.  Exam done in the presence of alisa Isabel.   General appearance: alert, cooperative, no distress, appears stated age  Head: Normocephalic, without obvious abnormality, atraumatic  Eyes: conjunctivae/corneas clear.   Nose: Nares normal. No discharge  Throat: Lips, mucosa, and tongue normal. Teeth and gums normal  Neck: supple, symmetrical, trachea

## 2025-01-21 ENCOUNTER — TRANSCRIBE ORDERS (OUTPATIENT)
Dept: ADMINISTRATIVE | Age: 30
End: 2025-01-21

## 2025-01-21 DIAGNOSIS — S32.2XXA CLOSED FRACTURE OF COCCYX, INITIAL ENCOUNTER (HCC): Primary | ICD-10-CM

## 2025-01-27 SDOH — ECONOMIC STABILITY: FOOD INSECURITY: WITHIN THE PAST 12 MONTHS, YOU WORRIED THAT YOUR FOOD WOULD RUN OUT BEFORE YOU GOT MONEY TO BUY MORE.: NEVER TRUE

## 2025-01-27 SDOH — ECONOMIC STABILITY: INCOME INSECURITY: IN THE LAST 12 MONTHS, WAS THERE A TIME WHEN YOU WERE NOT ABLE TO PAY THE MORTGAGE OR RENT ON TIME?: NO

## 2025-01-27 SDOH — ECONOMIC STABILITY: FOOD INSECURITY: WITHIN THE PAST 12 MONTHS, THE FOOD YOU BOUGHT JUST DIDN'T LAST AND YOU DIDN'T HAVE MONEY TO GET MORE.: NEVER TRUE

## 2025-01-28 ENCOUNTER — OFFICE VISIT (OUTPATIENT)
Dept: OBGYN | Age: 30
End: 2025-01-28
Payer: COMMERCIAL

## 2025-01-28 DIAGNOSIS — Z98.84 HISTORY OF BARIATRIC SURGERY: ICD-10-CM

## 2025-01-28 DIAGNOSIS — N94.10 DYSPAREUNIA, FEMALE: ICD-10-CM

## 2025-01-28 DIAGNOSIS — Z87.81 HISTORY OF FOOT FRACTURE: ICD-10-CM

## 2025-01-28 DIAGNOSIS — Z01.419 ENCOUNTER FOR ANNUAL ROUTINE GYNECOLOGICAL EXAMINATION: Primary | ICD-10-CM

## 2025-01-28 DIAGNOSIS — N95.2 ATROPHIC VAGINITIS: ICD-10-CM

## 2025-01-28 DIAGNOSIS — F34.1 DYSTHYMIA: ICD-10-CM

## 2025-01-28 PROCEDURE — 36415 COLL VENOUS BLD VENIPUNCTURE: CPT | Performed by: OBSTETRICS & GYNECOLOGY

## 2025-01-28 PROCEDURE — 99395 PREV VISIT EST AGE 18-39: CPT | Performed by: OBSTETRICS & GYNECOLOGY

## 2025-01-28 RX ORDER — BUPROPION HYDROCHLORIDE 150 MG/1
150 TABLET ORAL EVERY MORNING
Qty: 30 TABLET | Refills: 3 | Status: SHIPPED | OUTPATIENT
Start: 2025-01-28

## 2025-01-28 RX ORDER — ESTRADIOL 10 UG/1
10 INSERT VAGINAL
Qty: 12 TABLET | Refills: 11 | Status: SHIPPED | OUTPATIENT
Start: 2025-01-29

## 2025-01-28 ASSESSMENT — PATIENT HEALTH QUESTIONNAIRE - PHQ9
8. MOVING OR SPEAKING SO SLOWLY THAT OTHER PEOPLE COULD HAVE NOTICED. OR THE OPPOSITE, BEING SO FIGETY OR RESTLESS THAT YOU HAVE BEEN MOVING AROUND A LOT MORE THAN USUAL: NEARLY EVERY DAY
SUM OF ALL RESPONSES TO PHQ QUESTIONS 1-9: 23
6. FEELING BAD ABOUT YOURSELF - OR THAT YOU ARE A FAILURE OR HAVE LET YOURSELF OR YOUR FAMILY DOWN: NEARLY EVERY DAY
1. LITTLE INTEREST OR PLEASURE IN DOING THINGS: MORE THAN HALF THE DAYS
10. IF YOU CHECKED OFF ANY PROBLEMS, HOW DIFFICULT HAVE THESE PROBLEMS MADE IT FOR YOU TO DO YOUR WORK, TAKE CARE OF THINGS AT HOME, OR GET ALONG WITH OTHER PEOPLE: SOMEWHAT DIFFICULT
SUM OF ALL RESPONSES TO PHQ9 QUESTIONS 1 & 2: 5
9. THOUGHTS THAT YOU WOULD BE BETTER OFF DEAD, OR OF HURTING YOURSELF: SEVERAL DAYS
7. TROUBLE CONCENTRATING ON THINGS, SUCH AS READING THE NEWSPAPER OR WATCHING TELEVISION: NEARLY EVERY DAY
SUM OF ALL RESPONSES TO PHQ QUESTIONS 1-9: 24
SUM OF ALL RESPONSES TO PHQ QUESTIONS 1-9: 24
3. TROUBLE FALLING OR STAYING ASLEEP: NEARLY EVERY DAY
2. FEELING DOWN, DEPRESSED OR HOPELESS: NEARLY EVERY DAY
5. POOR APPETITE OR OVEREATING: NEARLY EVERY DAY
4. FEELING TIRED OR HAVING LITTLE ENERGY: NEARLY EVERY DAY
SUM OF ALL RESPONSES TO PHQ QUESTIONS 1-9: 24

## 2025-01-28 ASSESSMENT — COLUMBIA-SUICIDE SEVERITY RATING SCALE - C-SSRS
6. HAVE YOU EVER DONE ANYTHING, STARTED TO DO ANYTHING, OR PREPARED TO DO ANYTHING TO END YOUR LIFE?: NO
1. WITHIN THE PAST MONTH, HAVE YOU WISHED YOU WERE DEAD OR WISHED YOU COULD GO TO SLEEP AND NOT WAKE UP?: NO
2. HAVE YOU ACTUALLY HAD ANY THOUGHTS OF KILLING YOURSELF?: NO

## 2025-01-28 NOTE — PROGRESS NOTES
inverted nipple, mass, nipple discharge, skin change or tenderness.   Abdominal:      General: Abdomen is flat. There is no distension.      Palpations: Abdomen is soft. There is no mass.      Tenderness: There is no abdominal tenderness.      Hernia: No hernia is present. There is no hernia in the left inguinal area or right inguinal area.   Genitourinary:     Exam position: Lithotomy position.      Pubic Area: No rash.       Labia:         Right: No rash, tenderness or lesion.         Left: No rash, tenderness or lesion.       Urethra: No prolapse, urethral pain, urethral swelling or urethral lesion.      Vagina: Normal. No vaginal discharge, erythema, tenderness, bleeding or lesions.      Uterus: Absent.       Adnexa: Right adnexa normal and left adnexa normal.        Right: No mass, tenderness or fullness.          Left: No mass, tenderness or fullness.        Rectum: No mass or anal fissure. Normal anal tone.   Musculoskeletal:      Cervical back: Normal range of motion and neck supple.   Lymphadenopathy:      Upper Body:      Right upper body: No supraclavicular, axillary or pectoral adenopathy.      Left upper body: No supraclavicular, axillary or pectoral adenopathy.      Lower Body: No right inguinal adenopathy. No left inguinal adenopathy.   Skin:     General: Skin is warm and dry.   Neurological:      General: No focal deficit present.      Mental Status: She is alert and oriented to person, place, and time.   Psychiatric:         Mood and Affect: Mood normal.         Behavior: Behavior normal.         Thought Content: Thought content normal.         Judgment: Judgment normal.         No results found for this visit on 01/28/25.    ASSESSMENT AND PLAN   Diagnosis Orders   1. Encounter for annual routine gynecological examination        2. Dysthymia  buPROPion (WELLBUTRIN XL) 150 MG extended release tablet    CBC    TSH reflex to FT4,FT3    Comprehensive Metabolic Panel      3. Dyspareunia, female

## 2025-01-28 NOTE — PATIENT INSTRUCTIONS
MENTAL HEALTH RESOURCES  There are numerous counseling services available in [place] and [facility].    BEHAVIORAL HEALTH RESOURCES    CRISIS/EMERGENCY  Go to the nearest emergency department  [facility] [phone]  [facility]  [phone]    CALL OR TEXT 988 for the Suicide/Crisis Lifeline Available 24/7  www.suicidepreventionlifeline.org  CHAT  988lifeline.org      [facility]  Phone Number:  [phone]  Website:  [email or web address]/locations/[email or web address]/home-health-care/[facility]-home-[facility]    [facility]  What they offer:  Crisis Care/Outpatient  Phone Number:  [phone]  Website:  https://www.tcn.org    Dr. Uri Pollard, Psychologist  Phone Number:  [place] [phone]  Phone Number:  [place] [phone]    [employer]  What they offer:  24/7 Hotline  Phone Numbers:  (818) 798-2334 / [phone]    [facility]  What they offer:  Inpatient/Outpatient adult & youth programs  Address:  [address] [place] [address]  Phone Number:  [phone] (Crisis Counseling 24/7)  Website:  https://mhscc.org    [facility] (Private Practice)  Address:  [address] [place] [address]  Phone:  [phone]  Website:  www.positiveperspectivescoUSA Discounters.Paxer    [facility] (Private Practice)  Address:  [address], [place]   Phone:  [phone]    Dr. Keiko Ojeda (Counselor)  Address:  [address] [facility], [place]  Phone:  [phone]    [facility] [employer] (for students)  Available 24/7, including breaks and summer  Phone:  [phone]    [facility] (for students)  [place]  Phone:  [phone]  Website:  [email or web address]    American Psychological Association  Phone:  [phone]) 258-3307  Website:  http://apa.org/    Anxiety Disorders Association of

## 2025-01-29 LAB
25(OH)D3 SERPL-MCNC: 33.1 NG/ML
ALBUMIN SERPL-MCNC: 5 G/DL (ref 3.4–5)
ALBUMIN/GLOB SERPL: 2 {RATIO} (ref 1.1–2.2)
ALP SERPL-CCNC: 45 U/L (ref 40–129)
ALT SERPL-CCNC: 16 U/L (ref 10–40)
ANION GAP SERPL CALCULATED.3IONS-SCNC: 11 MMOL/L (ref 3–16)
AST SERPL-CCNC: 19 U/L (ref 15–37)
BILIRUB SERPL-MCNC: 0.4 MG/DL (ref 0–1)
BUN SERPL-MCNC: 18 MG/DL (ref 7–20)
CALCIUM SERPL-MCNC: 10.3 MG/DL (ref 8.3–10.6)
CHLORIDE SERPL-SCNC: 100 MMOL/L (ref 99–110)
CO2 SERPL-SCNC: 28 MMOL/L (ref 21–32)
CREAT SERPL-MCNC: 0.6 MG/DL (ref 0.6–1.1)
DEPRECATED RDW RBC AUTO: 12.6 % (ref 12.4–15.4)
GFR SERPLBLD CREATININE-BSD FMLA CKD-EPI: >90 ML/MIN/{1.73_M2}
GLUCOSE SERPL-MCNC: 79 MG/DL (ref 70–99)
HCT VFR BLD AUTO: 40.6 % (ref 36–48)
HGB BLD-MCNC: 13.9 G/DL (ref 12–16)
MCH RBC QN AUTO: 29.4 PG (ref 26–34)
MCHC RBC AUTO-ENTMCNC: 34.2 G/DL (ref 31–36)
MCV RBC AUTO: 85.9 FL (ref 80–100)
PLATELET # BLD AUTO: 323 K/UL (ref 135–450)
PMV BLD AUTO: 10 FL (ref 5–10.5)
POTASSIUM SERPL-SCNC: 4.3 MMOL/L (ref 3.5–5.1)
PROT SERPL-MCNC: 7.5 G/DL (ref 6.4–8.2)
RBC # BLD AUTO: 4.73 M/UL (ref 4–5.2)
SODIUM SERPL-SCNC: 139 MMOL/L (ref 136–145)
TSH SERPL DL<=0.005 MIU/L-ACNC: 1.01 UIU/ML (ref 0.27–4.2)
WBC # BLD AUTO: 8.5 K/UL (ref 4–11)

## 2025-02-03 ENCOUNTER — HOSPITAL ENCOUNTER (OUTPATIENT)
Dept: MRI IMAGING | Age: 30
Discharge: HOME | End: 2025-02-03
Attending: ORTHOPAEDIC SURGERY
Payer: COMMERCIAL

## 2025-02-03 DIAGNOSIS — S32.2XXA CLOSED FRACTURE OF COCCYX, INITIAL ENCOUNTER (HCC): ICD-10-CM

## 2025-02-03 PROCEDURE — 6360000004 HC RX CONTRAST MEDICATION: Performed by: ORTHOPAEDIC SURGERY

## 2025-02-03 PROCEDURE — A9579 GAD-BASE MR CONTRAST NOS,1ML: HCPCS | Performed by: ORTHOPAEDIC SURGERY

## 2025-02-03 PROCEDURE — 72197 MRI PELVIS W/O & W/DYE: CPT

## 2025-02-03 RX ADMIN — GADOTERIDOL 15 ML: 279.3 INJECTION, SOLUTION INTRAVENOUS at 17:34

## 2025-02-05 ENCOUNTER — HOSPITAL ENCOUNTER (OUTPATIENT)
Dept: MAMMOGRAPHY | Age: 30
Discharge: HOME | End: 2025-02-05
Attending: OBSTETRICS & GYNECOLOGY
Payer: COMMERCIAL

## 2025-02-05 DIAGNOSIS — Z98.84 HISTORY OF BARIATRIC SURGERY: ICD-10-CM

## 2025-02-05 DIAGNOSIS — Z87.81 HISTORY OF FOOT FRACTURE: ICD-10-CM

## 2025-02-05 PROCEDURE — 77080 DXA BONE DENSITY AXIAL: CPT

## 2025-04-03 ENCOUNTER — OFFICE VISIT (OUTPATIENT)
Dept: OBGYN | Age: 30
End: 2025-04-03
Payer: COMMERCIAL

## 2025-04-03 DIAGNOSIS — F34.1 DYSTHYMIA: Primary | ICD-10-CM

## 2025-04-03 DIAGNOSIS — G43.901 MIGRAINE WITH STATUS MIGRAINOSUS, NOT INTRACTABLE, UNSPECIFIED MIGRAINE TYPE: ICD-10-CM

## 2025-04-03 DIAGNOSIS — Z00.00 ROUTINE GENERAL MEDICAL EXAMINATION AT A HEALTH CARE FACILITY: ICD-10-CM

## 2025-04-03 DIAGNOSIS — N94.10 DYSPAREUNIA, FEMALE: ICD-10-CM

## 2025-04-03 PROCEDURE — G8417 CALC BMI ABV UP PARAM F/U: HCPCS | Performed by: OBSTETRICS & GYNECOLOGY

## 2025-04-03 PROCEDURE — G8427 DOCREV CUR MEDS BY ELIG CLIN: HCPCS | Performed by: OBSTETRICS & GYNECOLOGY

## 2025-04-03 PROCEDURE — 99214 OFFICE O/P EST MOD 30 MIN: CPT | Performed by: OBSTETRICS & GYNECOLOGY

## 2025-04-03 PROCEDURE — 1036F TOBACCO NON-USER: CPT | Performed by: OBSTETRICS & GYNECOLOGY

## 2025-04-03 RX ORDER — TOPIRAMATE 25 MG/1
25 TABLET, FILM COATED ORAL 2 TIMES DAILY
Qty: 60 TABLET | Refills: 0 | Status: SHIPPED | OUTPATIENT
Start: 2025-04-03 | End: 2025-05-03

## 2025-04-03 RX ORDER — TOPIRAMATE 50 MG/1
50 TABLET, FILM COATED ORAL 2 TIMES DAILY
Qty: 60 TABLET | Refills: 11 | Status: SHIPPED | OUTPATIENT
Start: 2025-05-01

## 2025-04-03 NOTE — PROGRESS NOTES
4/3/25    Estefani Rene  1995    Chief Complaint   Patient presents with    Other     Pt here after trial of Wellbutrin and Vagifem d/t Dysthymia, dyspareunia, and Atrophic vaginitis. Also here to discuss labs. Pt states sadness is better but mood is the same.     CollectedUpdatedProcedure  01/28/2025 7507791/29/2025 0123  CBC [[id number]]   Blood   Component Value Units   WBC 8.5 K/uL   RBC 4.73 M/uL   Hemoglobin 13.9 g/dL   Hematocrit 40.6 %   MCV 85.9 fL   MCH 29.4 pg   MCHC 34.2 g/dL   RDW 12.6 %   Platelets 323 K/uL   MPV 10.0 fL   01/28/2025 5214901/29/2025 0117  TSH reflex to FT4,FT3 [[id number]]   Blood   Component Value Units   TSH 1.01 uIU/mL   01/28/2025 2849781/29/2025 0117  Comprehensive Metabolic Panel [[id number]]   Blood   Component Value Units   Sodium 139 mmol/L   Potassium 4.3 mmol/L   Chloride 100 mmol/L   CO2 28 mmol/L   Anion Gap 11    Glucose 79 mg/dL   BUN 18 mg/dL   Creatinine 0.6 mg/dL   Est, Glom Filt Rate >90     Calcium 10.3 mg/dL   Total Protein 7.5 g/dL   Albumin 5.0 g/dL   Albumin/Globulin Ratio 2.0    Total Bilirubin 0.4 mg/dL   Alkaline Phosphatase 45 U/L   ALT 16 U/L   AST 19 U/L   01/28/2025 2415271/29/2025 0119  Vitamin D 25 Hydroxy [[id number]]   Blood   Component Value Units   Vit D, 25-Hydroxy 33.1  ng/mL       Estefani Rene is a 30 y.o. female who presents today for evaluation of migraines, dysthymia.    Past Medical History:   Diagnosis Date    37 weeks gestation of pregnancy 06/28/2016    Anemia     Celiac disease     COVID-19 01/01/2022    Positive test (see under media)    Diverticulosis     Dyspareunia in female     Fatigue     Fractures     Gastritis     H/O bariatric surgery     H/O echocardiogram 05/21/2021    EF 55-60% no evidence of pericardial effusion no significant valvular regurgitation    History of blood transfusion 2014    Received 5 units after childbirth    History of postpartum hemorrhage, currently pregnant in third trimester 06/28/2016    Hx of

## 2025-04-04 LAB — FSH SERPL-ACNC: 4 MIU/ML

## 2025-04-06 LAB — MIS SERPL-MCNC: 7.67 NG/ML (ref 0.18–11.71)

## 2025-04-07 LAB
ESTRADIOL SERPL HS-MCNC: 112.4 PG/ML
ESTROGEN SERPL CALC-MCNC: 177.6 PG/ML
ESTRONE SERPL-MCNC: 65.2 PG/ML

## 2025-04-08 ENCOUNTER — OFFICE VISIT (OUTPATIENT)
Dept: FAMILY MEDICINE CLINIC | Age: 30
End: 2025-04-08
Payer: COMMERCIAL

## 2025-04-08 VITALS
HEIGHT: 63 IN | HEART RATE: 83 BPM | OXYGEN SATURATION: 99 % | BODY MASS INDEX: 30.65 KG/M2 | SYSTOLIC BLOOD PRESSURE: 100 MMHG | DIASTOLIC BLOOD PRESSURE: 72 MMHG | WEIGHT: 173 LBS

## 2025-04-08 DIAGNOSIS — G43.009 MIGRAINE WITHOUT AURA AND WITHOUT STATUS MIGRAINOSUS, NOT INTRACTABLE: ICD-10-CM

## 2025-04-08 DIAGNOSIS — Z00.00 ENCOUNTER FOR MEDICAL EXAMINATION TO ESTABLISH CARE: Primary | ICD-10-CM

## 2025-04-08 DIAGNOSIS — F51.04 PSYCHOPHYSIOLOGICAL INSOMNIA: ICD-10-CM

## 2025-04-08 DIAGNOSIS — K58.1 IRRITABLE BOWEL SYNDROME WITH CONSTIPATION: ICD-10-CM

## 2025-04-08 PROBLEM — K21.9 ESOPHAGEAL REFLUX: Status: RESOLVED | Noted: 2021-03-19 | Resolved: 2025-04-08

## 2025-04-08 PROBLEM — R10.84 GENERALIZED ABDOMINAL PAIN: Status: RESOLVED | Noted: 2022-08-05 | Resolved: 2025-04-08

## 2025-04-08 PROBLEM — K64.1 SECOND DEGREE HEMORRHOIDS: Status: ACTIVE | Noted: 2022-09-01

## 2025-04-08 PROBLEM — K57.30 DIVERTICULAR DISEASE OF COLON: Status: ACTIVE | Noted: 2022-09-01

## 2025-04-08 PROBLEM — F42.9 OBSESSIVE COMPULSIVE DISORDER: Status: ACTIVE | Noted: 2021-01-01

## 2025-04-08 PROBLEM — F41.9 ANXIETY: Status: RESOLVED | Noted: 2017-04-03 | Resolved: 2025-04-08

## 2025-04-08 PROBLEM — F32.9 MAJOR DEPRESSION, SINGLE EPISODE: Status: ACTIVE | Noted: 2022-07-20

## 2025-04-08 PROBLEM — E66.01 MORBID OBESITY WITH BMI OF 40.0-44.9, ADULT: Status: RESOLVED | Noted: 2021-03-19 | Resolved: 2025-04-08

## 2025-04-08 PROBLEM — F32.9 MAJOR DEPRESSION, SINGLE EPISODE: Status: RESOLVED | Noted: 2022-07-20 | Resolved: 2025-04-08

## 2025-04-08 PROBLEM — E66.01 MORBID OBESITY DUE TO EXCESS CALORIES: Status: RESOLVED | Noted: 2021-06-16 | Resolved: 2025-04-08

## 2025-04-08 PROBLEM — D64.9 ANEMIA: Status: ACTIVE | Noted: 2022-07-20

## 2025-04-08 PROBLEM — F41.1 GENERALIZED ANXIETY DISORDER: Status: ACTIVE | Noted: 2022-07-20

## 2025-04-08 PROBLEM — R10.84 GENERALIZED ABDOMINAL PAIN: Status: ACTIVE | Noted: 2022-08-05

## 2025-04-08 PROBLEM — Z80.0 FAMILY HISTORY OF MALIGNANT NEOPLASM OF GASTROINTESTINAL TRACT: Status: ACTIVE | Noted: 2022-08-05

## 2025-04-08 PROCEDURE — G8427 DOCREV CUR MEDS BY ELIG CLIN: HCPCS

## 2025-04-08 PROCEDURE — G8417 CALC BMI ABV UP PARAM F/U: HCPCS

## 2025-04-08 PROCEDURE — 1036F TOBACCO NON-USER: CPT

## 2025-04-08 PROCEDURE — 99204 OFFICE O/P NEW MOD 45 MIN: CPT

## 2025-04-08 RX ORDER — METHOCARBAMOL 750 MG/1
TABLET, FILM COATED ORAL
COMMUNITY
Start: 2025-03-27

## 2025-04-08 RX ORDER — SUMATRIPTAN 50 MG/1
50 TABLET, FILM COATED ORAL
Qty: 9 TABLET | Refills: 2 | Status: SHIPPED | OUTPATIENT
Start: 2025-04-08

## 2025-04-08 RX ORDER — PREGABALIN 50 MG/1
50 CAPSULE ORAL DAILY
COMMUNITY
Start: 2025-01-16 | End: 2025-04-08

## 2025-04-08 RX ORDER — LUBIPROSTONE 8 UG/1
8 CAPSULE ORAL 2 TIMES DAILY WITH MEALS
Qty: 60 CAPSULE | Refills: 3 | Status: SHIPPED | OUTPATIENT
Start: 2025-04-08

## 2025-04-08 SDOH — HEALTH STABILITY: PHYSICAL HEALTH: ON AVERAGE, HOW MANY DAYS PER WEEK DO YOU ENGAGE IN MODERATE TO STRENUOUS EXERCISE (LIKE A BRISK WALK)?: 0 DAYS

## 2025-04-08 SDOH — HEALTH STABILITY: PHYSICAL HEALTH: ON AVERAGE, HOW MANY MINUTES DO YOU ENGAGE IN EXERCISE AT THIS LEVEL?: 0 MIN

## 2025-04-08 ASSESSMENT — ENCOUNTER SYMPTOMS
BLOOD IN STOOL: 0
ABDOMINAL DISTENTION: 0
CONSTIPATION: 0
NAUSEA: 0
BACK PAIN: 1
SHORTNESS OF BREATH: 0
ABDOMINAL PAIN: 0
COLOR CHANGE: 0
VOMITING: 0
DIARRHEA: 0
WHEEZING: 0

## 2025-04-08 NOTE — PROGRESS NOTES
effect profile reviewed with patient. Advised to start with one dose daily continuing her current regimen, if tolerated may decrease dulcolax and add in second daily dosing.   - psyllium (KONSYL) 28.3 % PACK; Take 1 packet by mouth daily  - lubiprostone (AMITIZA) 8 MCG CAPS capsule; Take 1 capsule by mouth 2 times daily (with meals)  Dispense: 60 capsule; Refill: 3    4. Psychophysiological insomnia  Given the addition of two medications today will plan to address issues with sleep at an upcoming visit.       Return in about 6 weeks (around 5/20/2025).          Plan of care reviewed with patient as described above, all questions answered at this time. Patient agreeable with plan of care.       Electronically signed by MIREYA Khanna CNP on 4/8/2025

## 2025-05-27 ENCOUNTER — OFFICE VISIT (OUTPATIENT)
Dept: FAMILY MEDICINE CLINIC | Age: 30
End: 2025-05-27
Payer: COMMERCIAL

## 2025-05-27 VITALS
DIASTOLIC BLOOD PRESSURE: 52 MMHG | SYSTOLIC BLOOD PRESSURE: 102 MMHG | BODY MASS INDEX: 30.3 KG/M2 | HEIGHT: 63 IN | HEART RATE: 83 BPM | WEIGHT: 171 LBS | OXYGEN SATURATION: 99 %

## 2025-05-27 DIAGNOSIS — G43.901 MIGRAINE WITH STATUS MIGRAINOSUS, NOT INTRACTABLE, UNSPECIFIED MIGRAINE TYPE: ICD-10-CM

## 2025-05-27 DIAGNOSIS — K58.1 IRRITABLE BOWEL SYNDROME WITH CONSTIPATION: Primary | ICD-10-CM

## 2025-05-27 DIAGNOSIS — F51.04 PSYCHOPHYSIOLOGICAL INSOMNIA: ICD-10-CM

## 2025-05-27 PROCEDURE — G8427 DOCREV CUR MEDS BY ELIG CLIN: HCPCS

## 2025-05-27 PROCEDURE — G8417 CALC BMI ABV UP PARAM F/U: HCPCS

## 2025-05-27 PROCEDURE — 99214 OFFICE O/P EST MOD 30 MIN: CPT

## 2025-05-27 PROCEDURE — 1036F TOBACCO NON-USER: CPT

## 2025-05-27 RX ORDER — TRAZODONE HYDROCHLORIDE 50 MG/1
25-50 TABLET ORAL NIGHTLY
Qty: 30 TABLET | Refills: 2 | Status: SHIPPED | OUTPATIENT
Start: 2025-05-27

## 2025-05-27 RX ORDER — TOPIRAMATE 50 MG/1
50 TABLET, FILM COATED ORAL 2 TIMES DAILY
Qty: 60 TABLET | Refills: 11
Start: 2025-05-27

## 2025-05-27 RX ORDER — TOPIRAMATE 25 MG/1
25 TABLET, FILM COATED ORAL 2 TIMES DAILY
Qty: 60 TABLET | Refills: 2
Start: 2025-05-27

## 2025-05-27 ASSESSMENT — ENCOUNTER SYMPTOMS
CONSTIPATION: 1
RESPIRATORY NEGATIVE: 1

## 2025-05-29 ENCOUNTER — OFFICE VISIT (OUTPATIENT)
Dept: OBGYN | Age: 30
End: 2025-05-29
Payer: COMMERCIAL

## 2025-05-29 VITALS — BODY MASS INDEX: 30.29 KG/M2 | WEIGHT: 171 LBS | SYSTOLIC BLOOD PRESSURE: 101 MMHG | DIASTOLIC BLOOD PRESSURE: 69 MMHG

## 2025-05-29 DIAGNOSIS — N94.10 DYSPAREUNIA, FEMALE: ICD-10-CM

## 2025-05-29 DIAGNOSIS — G43.901 MIGRAINE WITH STATUS MIGRAINOSUS, NOT INTRACTABLE, UNSPECIFIED MIGRAINE TYPE: ICD-10-CM

## 2025-05-29 DIAGNOSIS — F34.1 DYSTHYMIA: Primary | ICD-10-CM

## 2025-05-29 PROCEDURE — 1036F TOBACCO NON-USER: CPT | Performed by: OBSTETRICS & GYNECOLOGY

## 2025-05-29 PROCEDURE — G8417 CALC BMI ABV UP PARAM F/U: HCPCS | Performed by: OBSTETRICS & GYNECOLOGY

## 2025-05-29 PROCEDURE — G8427 DOCREV CUR MEDS BY ELIG CLIN: HCPCS | Performed by: OBSTETRICS & GYNECOLOGY

## 2025-05-29 PROCEDURE — 99213 OFFICE O/P EST LOW 20 MIN: CPT | Performed by: OBSTETRICS & GYNECOLOGY

## 2025-05-29 NOTE — PROGRESS NOTES
Collected Updated Procedure    04/03/2025 0857 04/06/2025 1109 ANTI MULLERIAN HORMONE [[id number]]   Blood    Component Value Units   Anti Mullerian Hormone 7.665  ng/mL          04/03/2025 0857 04/04/2025 0040 Follicle Stimulating Hormone [[id number]]   Blood    Component Value Units   FSH 4.0  mIU/mL          04/03/2025 0857 04/07/2025 0459 Estrogens, Fractionated [[id number]]   Blood    Component Value Units   Estradiol 112.4  pg/mL   Estrone 65.2  pg/mL   Estrogen Total 177.6  pg/mL          01/28/2025 1705 01/29/2025 0119 Vitamin D 25 Hydroxy [[id number]]   Blood    Component Value Units   Vit D, 25-Hydroxy 33.1  ng/mL          01/28/2025 1655 01/29/2025 0117 Comprehensive Metabolic Panel [[id number]]   Blood    Component Value Units   Sodium 139 mmol/L   Potassium 4.3 mmol/L   Chloride 100 mmol/L   CO2 28 mmol/L   Anion Gap 11    Glucose 79 mg/dL   BUN 18 mg/dL   Creatinine 0.6 mg/dL   Est, Glom Filt Rate >90     Calcium 10.3 mg/dL   Total Protein 7.5 g/dL   Albumin 5.0 g/dL   Albumin/Globulin Ratio 2.0    Total Bilirubin 0.4 mg/dL   Alkaline Phosphatase 45 U/L   ALT 16 U/L   AST 19 U/L          01/28/2025 1655 01/29/2025 0117 TSH reflex to FT4,FT3 [[id number]]   Blood    Component Value Units   TSH 1.01 uIU/mL          01/28/2025 1655 01/29/2025 0123 CBC [[id number]]   Blood    Component Value Units   WBC 8.5 K/uL   RBC 4.73 M/uL   Hemoglobin 13.9 g/dL   Hematocrit 40.6 %   MCV 85.9 fL   MCH 29.4 pg   MCHC 34.2 g/dL   RDW 12.6 %   Platelets 323 K/uL   MPV 10.0 fL        5/29/25    Estefani Rene  1995    Chief Complaint   Patient presents with    Other     Pt had lab collected. Pt states stopped Wellbutrin and mood has improved. Pt states is still emotional. Pt seen pcp is starting on trazadone today.         Estefani Rene is a 30 y.o. female who presents today for evaluation of dysthymia, dyspareunia, migraines    Past Medical History:   Diagnosis Date    37 weeks gestation of pregnancy 06/28/2016

## 2025-06-09 DIAGNOSIS — G43.009 MIGRAINE WITHOUT AURA AND WITHOUT STATUS MIGRAINOSUS, NOT INTRACTABLE: ICD-10-CM

## 2025-06-09 RX ORDER — SUMATRIPTAN 50 MG/1
75 TABLET, FILM COATED ORAL
Qty: 135 TABLET | Refills: 1 | Status: SHIPPED | OUTPATIENT
Start: 2025-06-09

## 2025-06-18 DIAGNOSIS — G43.009 MIGRAINE WITHOUT AURA AND WITHOUT STATUS MIGRAINOSUS, NOT INTRACTABLE: ICD-10-CM

## 2025-06-18 DIAGNOSIS — G43.901 MIGRAINE WITH STATUS MIGRAINOSUS, NOT INTRACTABLE, UNSPECIFIED MIGRAINE TYPE: ICD-10-CM

## 2025-06-18 RX ORDER — SUMATRIPTAN 50 MG/1
50 TABLET, FILM COATED ORAL
Qty: 10 TABLET | Refills: 3 | Status: SHIPPED | OUTPATIENT
Start: 2025-06-18

## 2025-06-18 RX ORDER — TOPIRAMATE 50 MG/1
75 TABLET, FILM COATED ORAL 2 TIMES DAILY
Qty: 270 TABLET | Refills: 0 | Status: SHIPPED | OUTPATIENT
Start: 2025-06-18

## 2025-07-02 ENCOUNTER — PATIENT MESSAGE (OUTPATIENT)
Dept: FAMILY MEDICINE CLINIC | Age: 30
End: 2025-07-02

## 2025-07-02 DIAGNOSIS — G43.901 MIGRAINE WITH STATUS MIGRAINOSUS, NOT INTRACTABLE, UNSPECIFIED MIGRAINE TYPE: ICD-10-CM

## 2025-07-03 RX ORDER — TOPIRAMATE 50 MG/1
75 TABLET, FILM COATED ORAL 2 TIMES DAILY
Qty: 270 TABLET | Refills: 1 | Status: SHIPPED | OUTPATIENT
Start: 2025-07-03

## 2025-08-15 DIAGNOSIS — G43.901 MIGRAINE WITH STATUS MIGRAINOSUS, NOT INTRACTABLE, UNSPECIFIED MIGRAINE TYPE: ICD-10-CM

## 2025-08-15 RX ORDER — TOPIRAMATE 50 MG/1
75 TABLET, FILM COATED ORAL 2 TIMES DAILY
Qty: 270 TABLET | Refills: 1 | Status: SHIPPED | OUTPATIENT
Start: 2025-08-15

## 2025-08-19 DIAGNOSIS — F51.04 PSYCHOPHYSIOLOGICAL INSOMNIA: ICD-10-CM

## 2025-08-19 DIAGNOSIS — N94.10 DYSPAREUNIA, FEMALE: ICD-10-CM

## 2025-08-19 DIAGNOSIS — G43.009 MIGRAINE WITHOUT AURA AND WITHOUT STATUS MIGRAINOSUS, NOT INTRACTABLE: ICD-10-CM

## 2025-08-19 DIAGNOSIS — N95.2 ATROPHIC VAGINITIS: ICD-10-CM

## 2025-08-19 DIAGNOSIS — K58.1 IRRITABLE BOWEL SYNDROME WITH CONSTIPATION: ICD-10-CM

## 2025-08-20 RX ORDER — LUBIPROSTONE 0.01 MG/1
8 CAPSULE ORAL 2 TIMES DAILY WITH MEALS
Qty: 60 CAPSULE | Refills: 3 | Status: SHIPPED | OUTPATIENT
Start: 2025-08-20

## 2025-08-20 RX ORDER — SUMATRIPTAN 50 MG/1
50 TABLET, FILM COATED ORAL
Qty: 10 TABLET | Refills: 3 | Status: SHIPPED | OUTPATIENT
Start: 2025-08-20

## 2025-08-20 RX ORDER — ESTRADIOL 10 UG/1
10 TABLET, FILM COATED VAGINAL
Qty: 12 TABLET | Refills: 11 | Status: SHIPPED | OUTPATIENT
Start: 2025-08-20

## 2025-08-20 RX ORDER — TRAZODONE HYDROCHLORIDE 50 MG/1
25-50 TABLET ORAL NIGHTLY
Qty: 30 TABLET | Refills: 2 | Status: SHIPPED | OUTPATIENT
Start: 2025-08-20

## (undated) DEVICE — SUTURE SZ 0 27IN 5/8 CIR UR-6  TAPER PT VIOLET ABSRB VICRYL J603H

## (undated) DEVICE — ELECTRODE ES AD CRDLSS PT RET REM POLYHESIVE

## (undated) DEVICE — CANNULA SEAL

## (undated) DEVICE — EXCEL 10FT (3.05 M) INSUFFLATION TUBING SET WITH 0.1 MICRON FILTER: Brand: EXCEL

## (undated) DEVICE — SYRINGE MED 20ML STD CLR PLAS LUERLOCK TIP N CTRL DISP

## (undated) DEVICE — SUTURE VCRL SZ 4-0 L27IN ABSRB UD L19MM FS-2 3/8 CIR REV J422H

## (undated) DEVICE — FORCEPS BX L240CM JAW DIA2.8MM L CAP W/ NDL MIC MESH TOOTH

## (undated) DEVICE — ARM DRAPE

## (undated) DEVICE — Device

## (undated) DEVICE — Z DISCONTINUED (USE MFG CAT MVABO)  TUBING GAS SAMPLING STD 6.5 FT FEMALE CONN SMRT CAPNOLINE

## (undated) DEVICE — NEEDLE HYPO 20GA L1.5IN YEL POLYPR HUB S STL REG BVL STR

## (undated) DEVICE — SUTURE V-LOC 180 SZ 3-0 L12IN ABSRB GRN V-20 L26MM 1/2 CIR VLOCL0614

## (undated) DEVICE — SET TBNG DISP TIP FOR AHTO

## (undated) DEVICE — GLOVE ORANGE PI 7 1/2   MSG9075

## (undated) DEVICE — SUREFORM 45: Brand: SUREFORM

## (undated) DEVICE — COLUMN DRAPE

## (undated) DEVICE — SUTURE STRATAFIX SPRL PDS + VLT 3-0 15CM DISPOSABLE/SNGLE SXPP1B420

## (undated) DEVICE — SYRINGE 20ML LL S/C 50

## (undated) DEVICE — GLOVE ORANGE PI 7   MSG9070

## (undated) DEVICE — LAPAROSCOPIC TROCAR SLEEVE/SINGLE USE: Brand: KII® OPTICAL ACCESS SYSTEM

## (undated) DEVICE — CADIERE FORCEPS: Brand: ENDOWRIST

## (undated) DEVICE — STAPLER 60 RELOAD BLUE: Brand: SUREFORM

## (undated) DEVICE — REDUCER: Brand: ENDOWRIST

## (undated) DEVICE — GOWN,ECLIPSE,POLYRNF,BRTHSLV,L,30/CS: Brand: MEDLINE

## (undated) DEVICE — LARGE SUTURE CUT NEEDLE DRIVER: Brand: ENDOWRIST

## (undated) DEVICE — STAPLER 60: Brand: SUREFORM

## (undated) DEVICE — GOWN,SIRUS,POLYRNF,BRTHSLV,XLN/XL,20/CS: Brand: MEDLINE

## (undated) DEVICE — STAPLER 60 RELOAD GREEN: Brand: SUREFORM

## (undated) DEVICE — VESSEL SEALER EXTEND: Brand: ENDOWRIST

## (undated) DEVICE — BLADELESS OBTURATOR: Brand: WECK VISTA

## (undated) DEVICE — SEAL

## (undated) DEVICE — GLOVE SURG SZ 7 L12IN FNGR THK79MIL GRN LTX FREE

## (undated) DEVICE — PACK SURG LAP CHOLE

## (undated) DEVICE — ADHESIVE SKIN CLSR 0.7ML TOP DERMBND ADV

## (undated) DEVICE — ENDOSCOPY KIT: Brand: MEDLINE INDUSTRIES, INC.